# Patient Record
Sex: FEMALE | Race: OTHER | HISPANIC OR LATINO | URBAN - METROPOLITAN AREA
[De-identification: names, ages, dates, MRNs, and addresses within clinical notes are randomized per-mention and may not be internally consistent; named-entity substitution may affect disease eponyms.]

---

## 2017-01-24 ENCOUNTER — OUTPATIENT (OUTPATIENT)
Dept: OUTPATIENT SERVICES | Facility: HOSPITAL | Age: 62
LOS: 1 days | End: 2017-01-24
Payer: COMMERCIAL

## 2017-01-24 PROCEDURE — 73564 X-RAY EXAM KNEE 4 OR MORE: CPT

## 2017-01-24 PROCEDURE — 73564 X-RAY EXAM KNEE 4 OR MORE: CPT | Mod: 26,RT

## 2018-02-26 PROBLEM — Z00.00 ENCOUNTER FOR PREVENTIVE HEALTH EXAMINATION: Status: ACTIVE | Noted: 2018-02-26

## 2018-02-27 ENCOUNTER — APPOINTMENT (OUTPATIENT)
Dept: HEART AND VASCULAR | Facility: CLINIC | Age: 63
End: 2018-02-27
Payer: COMMERCIAL

## 2018-02-27 VITALS
HEIGHT: 65 IN | DIASTOLIC BLOOD PRESSURE: 82 MMHG | TEMPERATURE: 99.1 F | OXYGEN SATURATION: 95 % | HEART RATE: 81 BPM | BODY MASS INDEX: 33.49 KG/M2 | SYSTOLIC BLOOD PRESSURE: 126 MMHG | WEIGHT: 201 LBS

## 2018-02-27 DIAGNOSIS — Z80.9 FAMILY HISTORY OF MALIGNANT NEOPLASM, UNSPECIFIED: ICD-10-CM

## 2018-02-27 DIAGNOSIS — Z82.49 FAMILY HISTORY OF ISCHEMIC HEART DISEASE AND OTHER DISEASES OF THE CIRCULATORY SYSTEM: ICD-10-CM

## 2018-02-27 DIAGNOSIS — Z83.3 FAMILY HISTORY OF DIABETES MELLITUS: ICD-10-CM

## 2018-02-27 DIAGNOSIS — Z82.3 FAMILY HISTORY OF STROKE: ICD-10-CM

## 2018-02-27 DIAGNOSIS — Z87.891 PERSONAL HISTORY OF NICOTINE DEPENDENCE: ICD-10-CM

## 2018-02-27 PROCEDURE — 93000 ELECTROCARDIOGRAM COMPLETE: CPT

## 2018-02-27 PROCEDURE — 99204 OFFICE O/P NEW MOD 45 MIN: CPT | Mod: 25

## 2018-03-21 ENCOUNTER — APPOINTMENT (OUTPATIENT)
Dept: HEART AND VASCULAR | Facility: CLINIC | Age: 63
End: 2018-03-21

## 2018-04-11 ENCOUNTER — APPOINTMENT (OUTPATIENT)
Dept: HEART AND VASCULAR | Facility: CLINIC | Age: 63
End: 2018-04-11
Payer: COMMERCIAL

## 2018-04-11 VITALS — HEIGHT: 65 IN | WEIGHT: 200.99 LBS | BODY MASS INDEX: 33.49 KG/M2

## 2018-04-11 PROCEDURE — 78452 HT MUSCLE IMAGE SPECT MULT: CPT

## 2018-04-11 PROCEDURE — 93015 CV STRESS TEST SUPVJ I&R: CPT

## 2018-04-11 PROCEDURE — A9500: CPT

## 2018-04-11 PROCEDURE — 93306 TTE W/DOPPLER COMPLETE: CPT

## 2019-08-14 ENCOUNTER — OUTPATIENT (OUTPATIENT)
Dept: OUTPATIENT SERVICES | Facility: HOSPITAL | Age: 64
LOS: 1 days | End: 2019-08-14
Payer: COMMERCIAL

## 2019-08-14 DIAGNOSIS — Z22.321 CARRIER OR SUSPECTED CARRIER OF METHICILLIN SUSCEPTIBLE STAPHYLOCOCCUS AUREUS: ICD-10-CM

## 2019-08-14 LAB
MRSA PCR RESULT.: NEGATIVE — SIGNIFICANT CHANGE UP
S AUREUS DNA NOSE QL NAA+PROBE: NEGATIVE — SIGNIFICANT CHANGE UP

## 2019-08-14 PROCEDURE — 87641 MR-STAPH DNA AMP PROBE: CPT

## 2019-08-20 ENCOUNTER — OUTPATIENT (OUTPATIENT)
Dept: OUTPATIENT SERVICES | Facility: HOSPITAL | Age: 64
LOS: 1 days | End: 2019-08-20
Payer: MEDICARE

## 2019-08-20 PROCEDURE — 73502 X-RAY EXAM HIP UNI 2-3 VIEWS: CPT

## 2019-08-20 PROCEDURE — 73502 X-RAY EXAM HIP UNI 2-3 VIEWS: CPT | Mod: 26,LT

## 2019-09-11 ENCOUNTER — APPOINTMENT (OUTPATIENT)
Dept: CT IMAGING | Facility: HOSPITAL | Age: 64
End: 2019-09-11
Payer: COMMERCIAL

## 2019-09-11 ENCOUNTER — OUTPATIENT (OUTPATIENT)
Dept: OUTPATIENT SERVICES | Facility: HOSPITAL | Age: 64
LOS: 1 days | End: 2019-09-11
Payer: MEDICARE

## 2019-09-11 PROCEDURE — 74178 CT ABD&PLV WO CNTR FLWD CNTR: CPT | Mod: 26

## 2019-09-11 PROCEDURE — 74178 CT ABD&PLV WO CNTR FLWD CNTR: CPT

## 2019-09-24 NOTE — ASU PATIENT PROFILE, ADULT - VISION (WITH CORRECTIVE LENSES IF THE PATIENT USUALLY WEARS THEM):
Partially impaired: cannot see medication labels or newsprint, but can see obstacles in path, and the surrounding layout; can count fingers at arm's length distance glass/Partially impaired: cannot see medication labels or newsprint, but can see obstacles in path, and the surrounding layout; can count fingers at arm's length

## 2019-09-24 NOTE — ASU PATIENT PROFILE, ADULT - PSH
History of bunionectomy of left great toe    History of cholecystectomy    History of lumbar surgery  L3-4,  History of tonsillectomy

## 2019-09-25 ENCOUNTER — INPATIENT (INPATIENT)
Facility: HOSPITAL | Age: 64
LOS: 2 days | Discharge: HOME HEALTH | DRG: 470 | End: 2019-09-28
Attending: ORTHOPAEDIC SURGERY | Admitting: ORTHOPAEDIC SURGERY
Payer: MEDICARE

## 2019-09-25 VITALS
HEIGHT: 66 IN | WEIGHT: 204.15 LBS | DIASTOLIC BLOOD PRESSURE: 78 MMHG | HEART RATE: 76 BPM | TEMPERATURE: 98 F | OXYGEN SATURATION: 97 % | SYSTOLIC BLOOD PRESSURE: 121 MMHG | RESPIRATION RATE: 16 BRPM

## 2019-09-25 DIAGNOSIS — Z98.890 OTHER SPECIFIED POSTPROCEDURAL STATES: Chronic | ICD-10-CM

## 2019-09-25 DIAGNOSIS — M16.12 UNILATERAL PRIMARY OSTEOARTHRITIS, LEFT HIP: ICD-10-CM

## 2019-09-25 DIAGNOSIS — Z90.89 ACQUIRED ABSENCE OF OTHER ORGANS: Chronic | ICD-10-CM

## 2019-09-25 DIAGNOSIS — Z90.49 ACQUIRED ABSENCE OF OTHER SPECIFIED PARTS OF DIGESTIVE TRACT: Chronic | ICD-10-CM

## 2019-09-25 DIAGNOSIS — J45.909 UNSPECIFIED ASTHMA, UNCOMPLICATED: ICD-10-CM

## 2019-09-25 LAB
ANION GAP SERPL CALC-SCNC: 9 MMOL/L — SIGNIFICANT CHANGE UP (ref 5–17)
APPEARANCE UR: ABNORMAL
BACTERIA # UR AUTO: PRESENT /HPF
BILIRUB UR-MCNC: NEGATIVE — SIGNIFICANT CHANGE UP
BLD GP AB SCN SERPL QL: NEGATIVE — SIGNIFICANT CHANGE UP
BUN SERPL-MCNC: 16 MG/DL — SIGNIFICANT CHANGE UP (ref 7–23)
CALCIUM SERPL-MCNC: 9 MG/DL — SIGNIFICANT CHANGE UP (ref 8.4–10.5)
CHLORIDE SERPL-SCNC: 106 MMOL/L — SIGNIFICANT CHANGE UP (ref 96–108)
CO2 SERPL-SCNC: 25 MMOL/L — SIGNIFICANT CHANGE UP (ref 22–31)
COLOR SPEC: YELLOW — SIGNIFICANT CHANGE UP
CREAT SERPL-MCNC: 0.92 MG/DL — SIGNIFICANT CHANGE UP (ref 0.5–1.3)
DIFF PNL FLD: ABNORMAL
EPI CELLS # UR: ABNORMAL /HPF (ref 0–5)
GLUCOSE SERPL-MCNC: 100 MG/DL — HIGH (ref 70–99)
GLUCOSE UR QL: NEGATIVE — SIGNIFICANT CHANGE UP
KETONES UR-MCNC: NEGATIVE — SIGNIFICANT CHANGE UP
LEUKOCYTE ESTERASE UR-ACNC: ABNORMAL
NITRITE UR-MCNC: NEGATIVE — SIGNIFICANT CHANGE UP
PH UR: 6 — SIGNIFICANT CHANGE UP (ref 5–8)
POTASSIUM SERPL-MCNC: 4.6 MMOL/L — SIGNIFICANT CHANGE UP (ref 3.5–5.3)
POTASSIUM SERPL-SCNC: 4.6 MMOL/L — SIGNIFICANT CHANGE UP (ref 3.5–5.3)
PROT UR-MCNC: NEGATIVE MG/DL — SIGNIFICANT CHANGE UP
RBC CASTS # UR COMP ASSIST: ABNORMAL /HPF
RH IG SCN BLD-IMP: NEGATIVE — SIGNIFICANT CHANGE UP
SODIUM SERPL-SCNC: 140 MMOL/L — SIGNIFICANT CHANGE UP (ref 135–145)
SP GR SPEC: 1.02 — SIGNIFICANT CHANGE UP (ref 1–1.03)
UROBILINOGEN FLD QL: 0.2 E.U./DL — SIGNIFICANT CHANGE UP
WBC UR QL: ABNORMAL /HPF

## 2019-09-25 PROCEDURE — 72170 X-RAY EXAM OF PELVIS: CPT | Mod: 26

## 2019-09-25 RX ORDER — DOCUSATE SODIUM 100 MG
100 CAPSULE ORAL THREE TIMES A DAY
Refills: 0 | Status: DISCONTINUED | OUTPATIENT
Start: 2019-09-25 | End: 2019-09-28

## 2019-09-25 RX ORDER — MORPHINE SULFATE 50 MG/1
2 CAPSULE, EXTENDED RELEASE ORAL EVERY 4 HOURS
Refills: 0 | Status: DISCONTINUED | OUTPATIENT
Start: 2019-09-25 | End: 2019-09-28

## 2019-09-25 RX ORDER — MORPHINE SULFATE 50 MG/1
2 CAPSULE, EXTENDED RELEASE ORAL
Refills: 0 | Status: DISCONTINUED | OUTPATIENT
Start: 2019-09-25 | End: 2019-09-28

## 2019-09-25 RX ORDER — OXYCODONE HYDROCHLORIDE 5 MG/1
5 TABLET ORAL EVERY 4 HOURS
Refills: 0 | Status: DISCONTINUED | OUTPATIENT
Start: 2019-09-25 | End: 2019-09-28

## 2019-09-25 RX ORDER — SODIUM CHLORIDE 9 MG/ML
1000 INJECTION, SOLUTION INTRAVENOUS
Refills: 0 | Status: DISCONTINUED | OUTPATIENT
Start: 2019-09-25 | End: 2019-09-28

## 2019-09-25 RX ORDER — MAGNESIUM HYDROXIDE 400 MG/1
30 TABLET, CHEWABLE ORAL DAILY
Refills: 0 | Status: DISCONTINUED | OUTPATIENT
Start: 2019-09-25 | End: 2019-09-28

## 2019-09-25 RX ORDER — SENNA PLUS 8.6 MG/1
2 TABLET ORAL AT BEDTIME
Refills: 0 | Status: DISCONTINUED | OUTPATIENT
Start: 2019-09-25 | End: 2019-09-28

## 2019-09-25 RX ORDER — PANTOPRAZOLE SODIUM 20 MG/1
40 TABLET, DELAYED RELEASE ORAL
Refills: 0 | Status: DISCONTINUED | OUTPATIENT
Start: 2019-09-25 | End: 2019-09-28

## 2019-09-25 RX ORDER — KETOROLAC TROMETHAMINE 30 MG/ML
15 SYRINGE (ML) INJECTION EVERY 8 HOURS
Refills: 0 | Status: DISCONTINUED | OUTPATIENT
Start: 2019-09-25 | End: 2019-09-27

## 2019-09-25 RX ORDER — CELECOXIB 200 MG/1
200 CAPSULE ORAL
Refills: 0 | Status: DISCONTINUED | OUTPATIENT
Start: 2019-09-27 | End: 2019-09-28

## 2019-09-25 RX ORDER — CEFAZOLIN SODIUM 1 G
2000 VIAL (EA) INJECTION EVERY 8 HOURS
Refills: 0 | Status: DISCONTINUED | OUTPATIENT
Start: 2019-09-25 | End: 2019-09-25

## 2019-09-25 RX ORDER — POLYETHYLENE GLYCOL 3350 17 G/17G
17 POWDER, FOR SOLUTION ORAL DAILY
Refills: 0 | Status: DISCONTINUED | OUTPATIENT
Start: 2019-09-25 | End: 2019-09-28

## 2019-09-25 RX ORDER — PROCHLORPERAZINE MALEATE 5 MG
5 TABLET ORAL ONCE
Refills: 0 | Status: DISCONTINUED | OUTPATIENT
Start: 2019-09-25 | End: 2019-09-28

## 2019-09-25 RX ORDER — ACETAMINOPHEN 500 MG
975 TABLET ORAL EVERY 8 HOURS
Refills: 0 | Status: DISCONTINUED | OUTPATIENT
Start: 2019-09-25 | End: 2019-09-28

## 2019-09-25 RX ORDER — ONDANSETRON 8 MG/1
4 TABLET, FILM COATED ORAL EVERY 6 HOURS
Refills: 0 | Status: DISCONTINUED | OUTPATIENT
Start: 2019-09-25 | End: 2019-09-28

## 2019-09-25 RX ORDER — CEFAZOLIN SODIUM 1 G
2000 VIAL (EA) INJECTION EVERY 8 HOURS
Refills: 0 | Status: COMPLETED | OUTPATIENT
Start: 2019-09-25 | End: 2019-09-26

## 2019-09-25 RX ORDER — ASPIRIN/CALCIUM CARB/MAGNESIUM 324 MG
325 TABLET ORAL
Refills: 0 | Status: DISCONTINUED | OUTPATIENT
Start: 2019-09-25 | End: 2019-09-28

## 2019-09-25 RX ORDER — OXYCODONE HYDROCHLORIDE 5 MG/1
10 TABLET ORAL EVERY 4 HOURS
Refills: 0 | Status: DISCONTINUED | OUTPATIENT
Start: 2019-09-25 | End: 2019-09-28

## 2019-09-25 RX ADMIN — Medication 15 MILLIGRAM(S): at 21:18

## 2019-09-25 RX ADMIN — Medication 325 MILLIGRAM(S): at 18:23

## 2019-09-25 RX ADMIN — Medication 15 MILLIGRAM(S): at 21:03

## 2019-09-25 RX ADMIN — Medication 2000 MILLIGRAM(S): at 18:23

## 2019-09-25 RX ADMIN — Medication 100 MILLIGRAM(S): at 21:03

## 2019-09-25 RX ADMIN — OXYCODONE HYDROCHLORIDE 10 MILLIGRAM(S): 5 TABLET ORAL at 22:03

## 2019-09-25 RX ADMIN — OXYCODONE HYDROCHLORIDE 10 MILLIGRAM(S): 5 TABLET ORAL at 21:03

## 2019-09-25 RX ADMIN — Medication 975 MILLIGRAM(S): at 22:03

## 2019-09-25 RX ADMIN — Medication 975 MILLIGRAM(S): at 21:03

## 2019-09-25 NOTE — H&P ADULT - PROBLEM SELECTOR PLAN 1
Admit to Orthopaedic Service.  Presents today for elective left hip DAY  Pt medically stable and cleared for procedure today by Dr. Enriquez

## 2019-09-25 NOTE — PROGRESS NOTE ADULT - SUBJECTIVE AND OBJECTIVE BOX
Orthopaedic Surgery Post Operative Check    Procedure: left total hip replacement   Surgeon: Dr. Solares     Pt comfortable without complaints, pain controlled  Denies CP, SOB, N/V, numbness/tingling    Vital Signs Last 24 Hrs  T(C): --  T(F): --  HR: 56 (09-25-19 @ 14:26) (54 - 68)  BP: 114/71 (09-25-19 @ 14:26) (114/71 - 133/75)  BP(mean): 97 (09-25-19 @ 13:50) (89 - 97)  RR: 10 (09-25-19 @ 14:26) (10 - 31)  SpO2: 96% (09-25-19 @ 14:26) (96% - 99%)  AVSS    General: Pt Alert and oriented, NAD  DSG C/D/I left hip prevena   Pulses: DP pulses palpable bilateral lower extremities   Sensation: intact to bilateral lower extremities   Motor: EHL/FHL/TA/GS 5/5 bilateral lower extremities     25 Sep 2019 09:07    140    |  106    |  16     ----------------------------<  100    4.6     |  25     |  0.92           Post-op X-Ray: prosthesis in place    A/P: 63yFemale POD#0 s/p  left total hip replacement   - Stable  - Pain Control  - DVT ppx: ASA  - Post op abx: ancef  - PT, WBS:  WBAT  - f/u AM labs     Ortho Pager [phone]

## 2019-09-25 NOTE — H&P ADULT - NSICDXPASTSURGICALHX_GEN_ALL_CORE_FT
PAST SURGICAL HISTORY:  History of bunionectomy of left great toe     History of cholecystectomy     History of lumbar surgery L3-4,    History of tonsillectomy

## 2019-09-25 NOTE — ASU PREOP CHECKLIST - NS PREOP CHK CHLOROHEX WASH
Pt instr lab here in [place] open til 7pm, will have to refrigerate stool if not able to bring today   in ASU:

## 2019-09-25 NOTE — PHYSICAL THERAPY INITIAL EVALUATION ADULT - CRITERIA FOR SKILLED THERAPEUTIC INTERVENTIONS
impairments found/anticipated discharge recommendation/therapy frequency/anticipated equipment needs at discharge/rehab potential

## 2019-09-25 NOTE — H&P ADULT - HISTORY OF PRESENT ILLNESS
63y F with left hip pain x numerous years worsening recently last 4 months. Patient states she has pain ambulating. uses a cane to ambulate. Patient has tried oral med with some relief. Patient denies any CP, SOB, fever, chills, numbness/tinging.   Patient here for left total hip replacement

## 2019-09-25 NOTE — H&P ADULT - NSHPLABSRESULTS_GEN_ALL_CORE
Preop cbc,, pt/inr, ptt, ; nasal swab negative  BMP -> K+ 5.5 repeat DOS  UA + leuks -> Repeat DOS  mrsa neg  preop cxr wnl per clearance  preop ekg wnl per clearance

## 2019-09-25 NOTE — H&P ADULT - NSHPPHYSICALEXAM_GEN_ALL_CORE
PE: Normal head, atraumatic. Normal skin, no rashes/lesions/erythema.  Left hip:  TTP of the left groin  Decrease ROM 2/2 to pain  Sensation intact to light touch  Muscle strength 5/5 to EHL/TA/GS  Pedal pulse 2+  Compartments are soft and compressible b/l, nonTTP        Rest of PE per medical clearance.

## 2019-09-26 ENCOUNTER — TRANSCRIPTION ENCOUNTER (OUTPATIENT)
Age: 64
End: 2019-09-26

## 2019-09-26 DIAGNOSIS — Z98.890 OTHER SPECIFIED POSTPROCEDURAL STATES: ICD-10-CM

## 2019-09-26 LAB
ANION GAP SERPL CALC-SCNC: 10 MMOL/L — SIGNIFICANT CHANGE UP (ref 5–17)
BUN SERPL-MCNC: 19 MG/DL — SIGNIFICANT CHANGE UP (ref 7–23)
CALCIUM SERPL-MCNC: 9 MG/DL — SIGNIFICANT CHANGE UP (ref 8.4–10.5)
CHLORIDE SERPL-SCNC: 103 MMOL/L — SIGNIFICANT CHANGE UP (ref 96–108)
CO2 SERPL-SCNC: 24 MMOL/L — SIGNIFICANT CHANGE UP (ref 22–31)
CREAT SERPL-MCNC: 0.84 MG/DL — SIGNIFICANT CHANGE UP (ref 0.5–1.3)
EXTRA GREEN TOP TUBE: SIGNIFICANT CHANGE UP
EXTRA LAVENDER TOP TUBE: SIGNIFICANT CHANGE UP
GLUCOSE SERPL-MCNC: 129 MG/DL — HIGH (ref 70–99)
HCT VFR BLD CALC: 37.6 % — SIGNIFICANT CHANGE UP (ref 34.5–45)
HCV AB S/CO SERPL IA: 0.06 S/CO — SIGNIFICANT CHANGE UP
HCV AB SERPL-IMP: SIGNIFICANT CHANGE UP
HGB BLD-MCNC: 12.2 G/DL — SIGNIFICANT CHANGE UP (ref 11.5–15.5)
MCHC RBC-ENTMCNC: 28.2 PG — SIGNIFICANT CHANGE UP (ref 27–34)
MCHC RBC-ENTMCNC: 32.4 GM/DL — SIGNIFICANT CHANGE UP (ref 32–36)
MCV RBC AUTO: 86.8 FL — SIGNIFICANT CHANGE UP (ref 80–100)
NRBC # BLD: 0 /100 WBCS — SIGNIFICANT CHANGE UP (ref 0–0)
PLATELET # BLD AUTO: 244 K/UL — SIGNIFICANT CHANGE UP (ref 150–400)
POTASSIUM SERPL-MCNC: 4.3 MMOL/L — SIGNIFICANT CHANGE UP (ref 3.5–5.3)
POTASSIUM SERPL-SCNC: 4.3 MMOL/L — SIGNIFICANT CHANGE UP (ref 3.5–5.3)
RBC # BLD: 4.33 M/UL — SIGNIFICANT CHANGE UP (ref 3.8–5.2)
RBC # FLD: 11.9 % — SIGNIFICANT CHANGE UP (ref 10.3–14.5)
SODIUM SERPL-SCNC: 137 MMOL/L — SIGNIFICANT CHANGE UP (ref 135–145)
WBC # BLD: 10.76 K/UL — HIGH (ref 3.8–10.5)
WBC # FLD AUTO: 10.76 K/UL — HIGH (ref 3.8–10.5)

## 2019-09-26 PROCEDURE — 99233 SBSQ HOSP IP/OBS HIGH 50: CPT | Mod: GC

## 2019-09-26 RX ORDER — SENNA PLUS 8.6 MG/1
2 TABLET ORAL
Qty: 0 | Refills: 0 | DISCHARGE
Start: 2019-09-26

## 2019-09-26 RX ORDER — OXYCODONE HYDROCHLORIDE 5 MG/1
1 TABLET ORAL
Qty: 28 | Refills: 0
Start: 2019-09-26 | End: 2019-10-02

## 2019-09-26 RX ORDER — POLYETHYLENE GLYCOL 3350 17 G/17G
17 POWDER, FOR SOLUTION ORAL
Qty: 0 | Refills: 0 | DISCHARGE
Start: 2019-09-26

## 2019-09-26 RX ORDER — CELECOXIB 200 MG/1
1 CAPSULE ORAL
Qty: 60 | Refills: 0
Start: 2019-09-26 | End: 2019-10-25

## 2019-09-26 RX ORDER — ACETAMINOPHEN 500 MG
3 TABLET ORAL
Qty: 0 | Refills: 0 | DISCHARGE
Start: 2019-09-26

## 2019-09-26 RX ORDER — DOCUSATE SODIUM 100 MG
1 CAPSULE ORAL
Qty: 0 | Refills: 0 | DISCHARGE
Start: 2019-09-26

## 2019-09-26 RX ORDER — ASPIRIN/CALCIUM CARB/MAGNESIUM 324 MG
1 TABLET ORAL
Qty: 60 | Refills: 0
Start: 2019-09-26 | End: 2019-10-25

## 2019-09-26 RX ADMIN — Medication 2000 MILLIGRAM(S): at 02:32

## 2019-09-26 RX ADMIN — OXYCODONE HYDROCHLORIDE 5 MILLIGRAM(S): 5 TABLET ORAL at 11:20

## 2019-09-26 RX ADMIN — Medication 975 MILLIGRAM(S): at 06:05

## 2019-09-26 RX ADMIN — Medication 100 MILLIGRAM(S): at 05:05

## 2019-09-26 RX ADMIN — OXYCODONE HYDROCHLORIDE 10 MILLIGRAM(S): 5 TABLET ORAL at 05:05

## 2019-09-26 RX ADMIN — OXYCODONE HYDROCHLORIDE 10 MILLIGRAM(S): 5 TABLET ORAL at 06:05

## 2019-09-26 RX ADMIN — PANTOPRAZOLE SODIUM 40 MILLIGRAM(S): 20 TABLET, DELAYED RELEASE ORAL at 05:06

## 2019-09-26 RX ADMIN — Medication 100 MILLIGRAM(S): at 21:34

## 2019-09-26 RX ADMIN — Medication 15 MILLIGRAM(S): at 05:05

## 2019-09-26 RX ADMIN — OXYCODONE HYDROCHLORIDE 10 MILLIGRAM(S): 5 TABLET ORAL at 18:55

## 2019-09-26 RX ADMIN — Medication 325 MILLIGRAM(S): at 17:56

## 2019-09-26 RX ADMIN — Medication 325 MILLIGRAM(S): at 05:06

## 2019-09-26 RX ADMIN — Medication 100 MILLIGRAM(S): at 13:42

## 2019-09-26 RX ADMIN — Medication 15 MILLIGRAM(S): at 05:20

## 2019-09-26 RX ADMIN — OXYCODONE HYDROCHLORIDE 5 MILLIGRAM(S): 5 TABLET ORAL at 10:20

## 2019-09-26 RX ADMIN — Medication 975 MILLIGRAM(S): at 05:05

## 2019-09-26 RX ADMIN — OXYCODONE HYDROCHLORIDE 10 MILLIGRAM(S): 5 TABLET ORAL at 17:55

## 2019-09-26 NOTE — PROGRESS NOTE ADULT - ASSESSMENT
63y f s/p L DAY/POSTERIOR 9/25    -PT WBAT/Posterior Hip Precautions  -Pain control  -DVT PPX: ASA  -Dispo Planning: Home

## 2019-09-26 NOTE — DISCHARGE NOTE PROVIDER - CARE PROVIDER_API CALL
Vance Solares)  Orthopaedic Surgery  [address]  [place], [place] [address]  Phone: [phone]  Fax: [phone]  Follow Up Time: Vance Solares)  Orthopaedic Surgery  [address] [place] [address]  Phone: [phone]  Fax: [phone]  Follow Up Time: 2 weeks

## 2019-09-26 NOTE — PROGRESS NOTE ADULT - SUBJECTIVE AND OBJECTIVE BOX
S: No events overnight    O:   Vital Signs Last 24 Hrs  T(C): 36.4 (26 Sep 2019 05:04), Max: 36.9 (25 Sep 2019 19:27)  T(F): 97.5 (26 Sep 2019 05:04), Max: 98.4 (25 Sep 2019 19:27)  HR: 74 (26 Sep 2019 05:04) (54 - 80)  BP: 122/73 (26 Sep 2019 05:04) (112/70 - 169/92)  BP(mean): 105 (25 Sep 2019 16:15) (89 - 105)  RR: 17 (26 Sep 2019 05:04) (10 - 31)  SpO2: 97% (26 Sep 2019 05:04) (95% - 99%)    09-25 @ 07:01  -  09-26 @ 07:00  --------------------------------------------------------  IN:  Total IN: 0 mL    OUT:    Voided: 450 mL  Total OUT: 450 mL    Total NET: -450 mL    PE:  LLE  DSG CDI  Sensation to light touch intact S/S/DP/SP/Tib, Strength EHL/FHL/TA/GS 5/5, DP 2+, Ext WWP    09-25    140  |  106  |  16  ----------------------------<  100<H>  4.6   |  25  |  0.92    Ca    9.0      25 Sep 2019 09:07

## 2019-09-26 NOTE — PROGRESS NOTE ADULT - SUBJECTIVE AND OBJECTIVE BOX
Interval Events: Reviewed  Patient seen and examined at bedside.    Patient is a 63y old  Female who presents with a chief complaint of left hip pain/surgery (26 Sep 2019 09:43)      PAST MEDICAL & SURGICAL HISTORY:  Asthma: inhalator stopped since one yr ago  History of bunionectomy of left great toe  History of tonsillectomy  History of cholecystectomy  History of lumbar surgery: L3-4,      MEDICATIONS:  Pulmonary:    Antimicrobials:    Anticoagulants:  aspirin enteric coated 325 milliGRAM(s) Oral two times a day    Cardiac:      Allergies    tetracyclines (Nausea)    Intolerances        Vital Signs Last 24 Hrs  T(C): 36.6 (26 Sep 2019 08:32), Max: 36.9 (25 Sep 2019 19:27)  T(F): 97.9 (26 Sep 2019 08:32), Max: 98.4 (25 Sep 2019 19:27)  HR: 66 (26 Sep 2019 08:32) (54 - 80)  BP: 125/79 (26 Sep 2019 08:32) (112/70 - 169/92)  BP(mean): 105 (25 Sep 2019 16:15) (103 - 105)  RR: 16 (26 Sep 2019 08:32) (10 - 27)  SpO2: 97% (26 Sep 2019 08:32) (95% - 98%)     @ 07: @ 07:00  --------------------------------------------------------  IN: 0 mL / OUT: 450 mL / NET: -450 mL     @ 07: @ 13:59  --------------------------------------------------------  IN: 910 mL / OUT: 250 mL / NET: 660 mL          Review of Systems:   •	General: negative  •	Skin/Breast: negative  •	Ophthalmologic: negative  •	ENMT: negative  •	Respiratory and Thorax: negative  •	Cardiovascular: negative  •	Gastrointestinal: negative  •	Genitourinary: negative  •	Musculoskeletal: negative  •	Neurological: negative  •	Psychiatric: negative  •	Hematology/Lymphatics: negative  •	Endocrine: negative  •	Allergic/Immunologic: negative    Physical Exam:   • Constitutional:	Well-developed, well nourished  • Eyes:	EOMI; PERRL; no drainage or redness  • ENMT:	No oral lesions; no gross abnormalities  • Neck	No bruits; no thyromegaly or nodules  • Breasts:	not examined  • Back:	No deformity or limitation of movement  • Respiratory:	Breath Sounds equal & clear to percussion & auscultation, no accessory muscle use  • Cardiovascular:	Regular rate & rhythm, normal S1, S2; no murmurs, gallops or rubs; no S3, S4  • Gastrointestinal:	Soft, non-tender, no hepatosplenomegaly, normal bowel sounds  • Genitourinary:	not examined  • Rectal: not examined  • Extremities:	No cyanosis, clubbing or edema  • Vascular:	Equal and normal pulses (carotid, femoral, dorsalis pedis)  • Neurologica:l	not examined  • Skin:	No lesions; no rash  • Lymph Nodes:	No lymphadedenopathy  • Musculoskeletal:	No joint pain, swelling or deformity; no limitation of movement        LABS:      CBC Full  -  ( 26 Sep 2019 08:01 )  WBC Count : 10.76 K/uL  RBC Count : 4.33 M/uL  Hemoglobin : 12.2 g/dL  Hematocrit : 37.6 %  Platelet Count - Automated : 244 K/uL  Mean Cell Volume : 86.8 fl  Mean Cell Hemoglobin : 28.2 pg  Mean Cell Hemoglobin Concentration : 32.4 gm/dL  Auto Neutrophil # : x  Auto Lymphocyte # : x  Auto Monocyte # : x  Auto Eosinophil # : x  Auto Basophil # : x  Auto Neutrophil % : x  Auto Lymphocyte % : x  Auto Monocyte % : x  Auto Eosinophil % : x  Auto Basophil % : x    -    137  |  103  |  19  ----------------------------<  129<H>  4.3   |  24  |  0.84    Ca    9.0      26 Sep 2019 08:02            Urinalysis Basic - ( 25 Sep 2019 08:34 )    Color: Yellow / Appearance: Hazy / S.025 / pH: x  Gluc: x / Ketone: NEGATIVE  / Bili: Negative / Urobili: 0.2 E.U./dL   Blood: x / Protein: NEGATIVE mg/dL / Nitrite: NEGATIVE   Leuk Esterase: Small / RBC: 5-10 /HPF / WBC 5-10 /HPF   Sq Epi: x / Non Sq Epi: 5-10 /HPF / Bacteria: Present /HPF                  RADIOLOGY & ADDITIONAL STUDIES (The following images were personally reviewed):  Palacios:                                     No  Urine output:                       adequate  DVT prophylaxis:                 Yes  Flattus:                                  Yes  Bowel movement:              No Interval Events: Reviewed  Patient seen and examined at bedside.    Patient is a 63y old  Female who presents with a chief complaint of left hip pain/surgery (26 Sep 2019 09:43)  she is doing well  pain is controlled and she did PT     PAST MEDICAL & SURGICAL HISTORY:  Asthma: inhalator stopped since one yr ago  History of bunionectomy of left great toe  History of tonsillectomy  History of cholecystectomy  History of lumbar surgery: L3-4,      MEDICATIONS:  Pulmonary:    Antimicrobials:    Anticoagulants:  aspirin enteric coated 325 milliGRAM(s) Oral two times a day    Cardiac:      Allergies    tetracyclines (Nausea)    Intolerances        Vital Signs Last 24 Hrs  T(C): 36.6 (26 Sep 2019 08:32), Max: 36.9 (25 Sep 2019 19:27)  T(F): 97.9 (26 Sep 2019 08:32), Max: 98.4 (25 Sep 2019 19:27)  HR: 66 (26 Sep 2019 08:32) (54 - 80)  BP: 125/79 (26 Sep 2019 08:32) (112/70 - 169/92)  BP(mean): 105 (25 Sep 2019 16:15) (103 - 105)  RR: 16 (26 Sep 2019 08:32) (10 - 27)  SpO2: 97% (26 Sep 2019 08:32) (95% - 98%)     @ 07: @ 07:00  --------------------------------------------------------  IN: 0 mL / OUT: 450 mL / NET: -450 mL     @ 07: @ 13:59  --------------------------------------------------------  IN: 910 mL / OUT: 250 mL / NET: 660 mL          Review of Systems:   •	General: negative  •	Skin/Breast: negative  •	Ophthalmologic: negative  •	ENMT: negative  •	Respiratory and Thorax: negative  •	Cardiovascular: negative  •	Gastrointestinal: negative  •	Genitourinary: negative  •	Musculoskeletal: negative  •	Neurological: negative  •	Psychiatric: negative  •	Hematology/Lymphatics: negative  •	Endocrine: negative  •	Allergic/Immunologic: negative    Physical Exam:   • Constitutional:	Well-developed, well nourished  • Eyes:	EOMI; PERRL; no drainage or redness  • ENMT:	No oral lesions; no gross abnormalities  • Neck	No bruits; no thyromegaly or nodules  • Breasts:	not examined  • Back:	No deformity or limitation of movement  • Respiratory:	Breath Sounds equal & clear to percussion & auscultation, no accessory muscle use  • Cardiovascular:	Regular rate & rhythm, normal S1, S2; no murmurs, gallops or rubs; no S3, S4  • Gastrointestinal:	Soft, non-tender, no hepatosplenomegaly, normal bowel sounds  • Genitourinary:	not examined  • Rectal: not examined  • Extremities:	No cyanosis, clubbing or edema  • Vascular:	Equal and normal pulses (carotid, femoral, dorsalis pedis)  • Neurologica:l	not examined  • Skin:	No lesions; no rash  • Lymph Nodes:	No lymphadedenopathy  • Musculoskeletal:	No joint pain, swelling or deformity; no limitation of movement        LABS:      CBC Full  -  ( 26 Sep 2019 08:01 )  WBC Count : 10.76 K/uL  RBC Count : 4.33 M/uL  Hemoglobin : 12.2 g/dL  Hematocrit : 37.6 %  Platelet Count - Automated : 244 K/uL  Mean Cell Volume : 86.8 fl  Mean Cell Hemoglobin : 28.2 pg  Mean Cell Hemoglobin Concentration : 32.4 gm/dL  Auto Neutrophil # : x  Auto Lymphocyte # : x  Auto Monocyte # : x  Auto Eosinophil # : x  Auto Basophil # : x  Auto Neutrophil % : x  Auto Lymphocyte % : x  Auto Monocyte % : x  Auto Eosinophil % : x  Auto Basophil % : x        137  |  103  |  19  ----------------------------<  129<H>  4.3   |  24  |  0.84    Ca    9.0      26 Sep 2019 08:02            Urinalysis Basic - ( 25 Sep 2019 08:34 )    Color: Yellow / Appearance: Hazy / S.025 / pH: x  Gluc: x / Ketone: NEGATIVE  / Bili: Negative / Urobili: 0.2 E.U./dL   Blood: x / Protein: NEGATIVE mg/dL / Nitrite: NEGATIVE   Leuk Esterase: Small / RBC: 5-10 /HPF / WBC 5-10 /HPF   Sq Epi: x / Non Sq Epi: 5-10 /HPF / Bacteria: Present /HPF                  RADIOLOGY & ADDITIONAL STUDIES (The following images were personally reviewed):  Palacios:                                     No  Urine output:                       adequate  DVT prophylaxis:                 Yes  Flattus:                                  Yes  Bowel movement:              No

## 2019-09-26 NOTE — DISCHARGE NOTE PROVIDER - NSDCACTIVITY_GEN_ALL_CORE
Do not drive or operate machinery/Do not make important decisions Showering allowed/Walking - Indoors allowed/Do not drive or operate machinery/Do not make important decisions

## 2019-09-26 NOTE — PROGRESS NOTE ADULT - SUBJECTIVE AND OBJECTIVE BOX
Orthopaedic Surgery Progress Note    Post-operative day #1 s/p left total hip replacement     Subjective:     Patient seen and examined. Patient comfortable without complaints, pain controlled.  Denies chest pain, shortness of breath, nausea/vomiting, numbness/tingling.    Objective:    Vital Signs Last 24 Hrs  T(C): 36.4 (09-26-19 @ 05:04), Max: 36.4 (09-26-19 @ 05:04)  T(F): 97.5 (09-26-19 @ 05:04), Max: 97.5 (09-26-19 @ 05:04)  HR: 74 (09-26-19 @ 05:04) (74 - 74)  BP: 122/73 (09-26-19 @ 05:04) (122/73 - 122/73)  BP(mean): --  RR: 17 (09-26-19 @ 05:04) (17 - 17)  SpO2: 97% (09-26-19 @ 05:04) (97% - 97%)  AVSS    PE:  General: Patient alert and oriented, NAD  Dressing: Clean/dry/intact left hip prevena  Pulses: DP pulses palpable bilateral lower extremities   Sensation: intact to bilateral lower extremities   Motor: EHL/FHL/TA/GS 5/5 bilateral lower extremities     25 Sep 2019 09:07    140    |  106    |  16     ----------------------------<  100    4.6     |  25     |  0.92           A/P: 63yFemale POD#1 s/p left THR  1. Pain control as needed  2. DVT prophylaxis: ASA  3. PT, weight-bearing status: WBAT  4. Dispo: home with home PT when clears  5. Appreciate medicine recommendations Dr. James   6. AM labs pending     Ortho Pager [phone]

## 2019-09-26 NOTE — DISCHARGE NOTE PROVIDER - NSDCFUADDINST_GEN_ALL_CORE_FT
Weight bear as tolerated with assistive device.  No strenuous activity, heavy lifting, driving or returning to work until cleared by MD.  You have a prevena dressing. The battery will die in approximately 7 days after surgery. Once the battery dies you may cut off the battery pack. You may shower - dressing is water-resistant, no soaking in bathtubs. Please keep battery pack dry.    Try to have regular bowel movements, take stool softener or laxative if necessary.  Swelling may travel all the way down leg to foot, this is normal and will subside in a few weeks.  Call to schedule an appt with  for follow up, if you have staples or sutures they will be removed in office.  Contact your doctor if you experience: fever greater than 101.5, chills, chest pain, difficulty breathing, redness or excessive drainage around the incision, other concerns.  Follow up with your primary care provider. Weight bear as tolerated with assistive device.  No strenuous activity, heavy lifting, driving or returning to work until cleared by MD.  You have a prevena dressing. The battery will die in approximately 7 days after surgery. Once the battery dies you may cut off the battery pack. You may shower - dressing is water-resistant, no soaking in bathtubs. Please keep battery pack dry.    Try to have regular bowel movements, take stool softener or laxative if necessary.  Swelling may travel all the way down leg to foot, this is normal and will subside in a few weeks.  Call to schedule an appt with Dr. Solares for follow up, if you have staples or sutures they will be removed in office.  Contact your doctor if you experience: fever greater than 101.5, chills, chest pain, difficulty breathing, redness or excessive drainage around the incision, other concerns.  Follow up with your primary care provider.

## 2019-09-26 NOTE — DISCHARGE NOTE PROVIDER - HOSPITAL COURSE
Admitted to orthopedic service 9/25/19    Surgery - left total hip replacement on 9/25/19     Julieta-op Antibiotics    Pain control    DVT prophylaxis    OOB/Physical Therapy

## 2019-09-26 NOTE — DISCHARGE NOTE PROVIDER - NSDCCPCAREPLAN_GEN_ALL_CORE_FT
PRINCIPAL DISCHARGE DIAGNOSIS  Diagnosis: Primary osteoarthritis of left hip  Assessment and Plan of Treatment: Primary osteoarthritis of left hip

## 2019-09-27 LAB
ANION GAP SERPL CALC-SCNC: 9 MMOL/L — SIGNIFICANT CHANGE UP (ref 5–17)
BUN SERPL-MCNC: 21 MG/DL — SIGNIFICANT CHANGE UP (ref 7–23)
CALCIUM SERPL-MCNC: 8.5 MG/DL — SIGNIFICANT CHANGE UP (ref 8.4–10.5)
CHLORIDE SERPL-SCNC: 105 MMOL/L — SIGNIFICANT CHANGE UP (ref 96–108)
CO2 SERPL-SCNC: 25 MMOL/L — SIGNIFICANT CHANGE UP (ref 22–31)
CREAT SERPL-MCNC: 0.92 MG/DL — SIGNIFICANT CHANGE UP (ref 0.5–1.3)
GLUCOSE SERPL-MCNC: 100 MG/DL — HIGH (ref 70–99)
HCT VFR BLD CALC: 33.3 % — LOW (ref 34.5–45)
HGB BLD-MCNC: 10.8 G/DL — LOW (ref 11.5–15.5)
MCHC RBC-ENTMCNC: 28 PG — SIGNIFICANT CHANGE UP (ref 27–34)
MCHC RBC-ENTMCNC: 32.4 GM/DL — SIGNIFICANT CHANGE UP (ref 32–36)
MCV RBC AUTO: 86.3 FL — SIGNIFICANT CHANGE UP (ref 80–100)
NRBC # BLD: 0 /100 WBCS — SIGNIFICANT CHANGE UP (ref 0–0)
PLATELET # BLD AUTO: 212 K/UL — SIGNIFICANT CHANGE UP (ref 150–400)
POTASSIUM SERPL-MCNC: 4.1 MMOL/L — SIGNIFICANT CHANGE UP (ref 3.5–5.3)
POTASSIUM SERPL-SCNC: 4.1 MMOL/L — SIGNIFICANT CHANGE UP (ref 3.5–5.3)
RBC # BLD: 3.86 M/UL — SIGNIFICANT CHANGE UP (ref 3.8–5.2)
RBC # FLD: 12.3 % — SIGNIFICANT CHANGE UP (ref 10.3–14.5)
SODIUM SERPL-SCNC: 139 MMOL/L — SIGNIFICANT CHANGE UP (ref 135–145)
WBC # BLD: 8.63 K/UL — SIGNIFICANT CHANGE UP (ref 3.8–10.5)
WBC # FLD AUTO: 8.63 K/UL — SIGNIFICANT CHANGE UP (ref 3.8–10.5)

## 2019-09-27 RX ADMIN — Medication 975 MILLIGRAM(S): at 14:05

## 2019-09-27 RX ADMIN — PANTOPRAZOLE SODIUM 40 MILLIGRAM(S): 20 TABLET, DELAYED RELEASE ORAL at 05:48

## 2019-09-27 RX ADMIN — Medication 975 MILLIGRAM(S): at 15:05

## 2019-09-27 RX ADMIN — OXYCODONE HYDROCHLORIDE 10 MILLIGRAM(S): 5 TABLET ORAL at 11:05

## 2019-09-27 RX ADMIN — OXYCODONE HYDROCHLORIDE 5 MILLIGRAM(S): 5 TABLET ORAL at 19:27

## 2019-09-27 RX ADMIN — OXYCODONE HYDROCHLORIDE 10 MILLIGRAM(S): 5 TABLET ORAL at 05:53

## 2019-09-27 RX ADMIN — OXYCODONE HYDROCHLORIDE 10 MILLIGRAM(S): 5 TABLET ORAL at 06:43

## 2019-09-27 RX ADMIN — OXYCODONE HYDROCHLORIDE 10 MILLIGRAM(S): 5 TABLET ORAL at 14:06

## 2019-09-27 RX ADMIN — Medication 100 MILLIGRAM(S): at 05:46

## 2019-09-27 RX ADMIN — Medication 325 MILLIGRAM(S): at 18:27

## 2019-09-27 RX ADMIN — OXYCODONE HYDROCHLORIDE 10 MILLIGRAM(S): 5 TABLET ORAL at 10:05

## 2019-09-27 RX ADMIN — Medication 100 MILLIGRAM(S): at 21:48

## 2019-09-27 RX ADMIN — Medication 100 MILLIGRAM(S): at 14:06

## 2019-09-27 RX ADMIN — Medication 975 MILLIGRAM(S): at 21:48

## 2019-09-27 RX ADMIN — POLYETHYLENE GLYCOL 3350 17 GRAM(S): 17 POWDER, FOR SOLUTION ORAL at 14:06

## 2019-09-27 RX ADMIN — CELECOXIB 200 MILLIGRAM(S): 200 CAPSULE ORAL at 05:46

## 2019-09-27 RX ADMIN — Medication 975 MILLIGRAM(S): at 22:48

## 2019-09-27 RX ADMIN — Medication 15 MILLIGRAM(S): at 14:06

## 2019-09-27 RX ADMIN — OXYCODONE HYDROCHLORIDE 5 MILLIGRAM(S): 5 TABLET ORAL at 18:27

## 2019-09-27 RX ADMIN — OXYCODONE HYDROCHLORIDE 10 MILLIGRAM(S): 5 TABLET ORAL at 15:06

## 2019-09-27 RX ADMIN — OXYCODONE HYDROCHLORIDE 10 MILLIGRAM(S): 5 TABLET ORAL at 02:42

## 2019-09-27 RX ADMIN — Medication 325 MILLIGRAM(S): at 05:46

## 2019-09-27 RX ADMIN — CELECOXIB 200 MILLIGRAM(S): 200 CAPSULE ORAL at 19:27

## 2019-09-27 RX ADMIN — Medication 15 MILLIGRAM(S): at 14:21

## 2019-09-27 RX ADMIN — CELECOXIB 200 MILLIGRAM(S): 200 CAPSULE ORAL at 18:27

## 2019-09-27 RX ADMIN — OXYCODONE HYDROCHLORIDE 10 MILLIGRAM(S): 5 TABLET ORAL at 01:42

## 2019-09-27 RX ADMIN — CELECOXIB 200 MILLIGRAM(S): 200 CAPSULE ORAL at 06:46

## 2019-09-27 NOTE — OCCUPATIONAL THERAPY INITIAL EVALUATION ADULT - EATING, PREVIOUS LEVEL OF FUNCTION, OT EVAL
Preventive Health Recommendations  Male Ages 26 - 39    Yearly exam:             See your health care provider every year in order to  o   Review health changes.   o   Discuss preventive care.    o   Review your medicines if your doctor has prescribed any.    You should be tested each year for STDs (sexually transmitted diseases), if you re at risk.     After age 35, talk to your provider about cholesterol testing. If you are at risk for heart disease, have your cholesterol tested at least every 5 years.     If you are at risk for diabetes, you should have a diabetes test (fasting glucose).  Shots: Get a flu shot each year. Get a tetanus shot every 10 years.     Nutrition:    Eat at least 5 servings of fruits and vegetables daily.     Eat whole-grain bread, whole-wheat pasta and brown rice instead of white grains and rice.     Talk to your provider about Calcium and Vitamin D.     Lifestyle    Exercise for at least 150 minutes a week (30 minutes a day, 5 days a week). This will help you control your weight and prevent disease.     Limit alcohol to one drink per day.     No smoking.     Wear sunscreen to prevent skin cancer.     See your dentist every six months for an exam and cleaning.          independent

## 2019-09-27 NOTE — OCCUPATIONAL THERAPY INITIAL EVALUATION ADULT - ADDITIONAL COMMENTS
Patient reports full independence PTA, ambulates with SC outdoors 2/2 pain, also has grab bars in shower, and own commode.

## 2019-09-27 NOTE — PROGRESS NOTE ADULT - SUBJECTIVE AND OBJECTIVE BOX
Ortho Note    64 y/o female s/p left anterior DAY POD #2 with Dr Solares    Patient seen and examined at bedside  endorses increased pain today after PT x2 yesterday  Reports pain medication moderately effective   Denies CP, SOB, N/V, numbness/tingling   voiding without difficulty, +flatus    expresses apprehension about d/c home- lives alone     Vital Signs Last 24 Hrs  T(C): 36.6 (09-27-19 @ 08:26), Max: 37 (09-27-19 @ 05:40)  T(F): 97.8 (09-27-19 @ 08:26), Max: 98.6 (09-27-19 @ 05:40)  HR: 72 (09-27-19 @ 08:26) (72 - 78)  BP: 109/68 (09-27-19 @ 08:26) (109/68 - 111/72)  BP(mean): --  RR: 16 (09-27-19 @ 08:26) (16 - 17)  SpO2: 96% (09-27-19 @ 08:26) (96% - 96%)  AVSS    General: Pt Alert and oriented, NAD  Dressing C/D/I: provena at the the left hip   Pulses: 2+ DP pulses, skin warm, dry intact  calves soft, nontender   Sensation: intact to light touch bilateral LE   Motor: EHL/FHL/TA/GS 5/5 bilaterally                           10.8   8.63  )-----------( 212      ( 27 Sep 2019 06:31 )             33.3   27 Sep 2019 06:31    139    |  105    |  21     ----------------------------<  100    4.1     |  25     |  0.92     Ca    8.5        27 Sep 2019 06:31        A/P: 63yFemale POD#2 s/p L DAY     - Stable, VSS, post op labs stable   - Pain Control: continue PO PRN   - DVT ppx: on ASA 325mg BID   - PT, WBS: WBAT, PT  - OT consulted for home independence  - plan for d/c home pending PT/OT clearance     Ortho Pager [phone]

## 2019-09-27 NOTE — OCCUPATIONAL THERAPY INITIAL EVALUATION ADULT - GENERAL OBSERVATIONS, REHAB EVAL
Consult received. Full note to follow.    Please call for questions.    Thanks,  Bigg Bates MD  Infectious Disease Fellow, PGY-4  Pager: [phone]  [facility]     Patient right hand dominant. Chart reviewed, LOIDA Black cleared patient for OT evaluation. Patient received seated in chair, NAD, + prevena, +IV heplock.

## 2019-09-27 NOTE — PROGRESS NOTE ADULT - SUBJECTIVE AND OBJECTIVE BOX
S: No events overnight    O:   Vital Signs Last 24 Hrs  T(C): 37 (27 Sep 2019 05:40), Max: 37 (27 Sep 2019 05:40)  T(F): 98.6 (27 Sep 2019 05:40), Max: 98.6 (27 Sep 2019 05:40)  HR: 78 (27 Sep 2019 05:40) (66 - 78)  BP: 111/72 (27 Sep 2019 05:40) (111/72 - 125/79)  BP(mean): --  ABP: --  ABP(mean): --  RR: 17 (27 Sep 2019 05:40) (16 - 17)  SpO2: 96% (27 Sep 2019 05:40) (96% - 97%)      PE:  LLE  DSG CDI  Sensation to light touch intact S/S/DP/SP/Tib, Strength EHL/FHL/TA/GS 5/5, DP 2+, Ext WWP                        10.8   8.63  )-----------( 212      ( 27 Sep 2019 06:31 )             33.3   09-26    137  |  103  |  19  ----------------------------<  129<H>  4.3   |  24  |  0.84    Ca    9.0      26 Sep 2019 08:02

## 2019-09-28 ENCOUNTER — TRANSCRIPTION ENCOUNTER (OUTPATIENT)
Age: 64
End: 2019-09-28

## 2019-09-28 VITALS
OXYGEN SATURATION: 95 % | HEART RATE: 74 BPM | DIASTOLIC BLOOD PRESSURE: 75 MMHG | RESPIRATION RATE: 16 BRPM | SYSTOLIC BLOOD PRESSURE: 124 MMHG | TEMPERATURE: 98 F

## 2019-09-28 RX ADMIN — OXYCODONE HYDROCHLORIDE 10 MILLIGRAM(S): 5 TABLET ORAL at 05:47

## 2019-09-28 RX ADMIN — OXYCODONE HYDROCHLORIDE 10 MILLIGRAM(S): 5 TABLET ORAL at 06:47

## 2019-09-28 RX ADMIN — Medication 975 MILLIGRAM(S): at 05:47

## 2019-09-28 RX ADMIN — Medication 325 MILLIGRAM(S): at 05:47

## 2019-09-28 RX ADMIN — CELECOXIB 200 MILLIGRAM(S): 200 CAPSULE ORAL at 06:47

## 2019-09-28 RX ADMIN — Medication 975 MILLIGRAM(S): at 06:47

## 2019-09-28 RX ADMIN — ONDANSETRON 4 MILLIGRAM(S): 8 TABLET, FILM COATED ORAL at 08:28

## 2019-09-28 RX ADMIN — OXYCODONE HYDROCHLORIDE 10 MILLIGRAM(S): 5 TABLET ORAL at 11:40

## 2019-09-28 RX ADMIN — OXYCODONE HYDROCHLORIDE 10 MILLIGRAM(S): 5 TABLET ORAL at 12:40

## 2019-09-28 RX ADMIN — Medication 100 MILLIGRAM(S): at 05:47

## 2019-09-28 RX ADMIN — PANTOPRAZOLE SODIUM 40 MILLIGRAM(S): 20 TABLET, DELAYED RELEASE ORAL at 05:47

## 2019-09-28 RX ADMIN — CELECOXIB 200 MILLIGRAM(S): 200 CAPSULE ORAL at 05:47

## 2019-09-28 NOTE — PROGRESS NOTE ADULT - SUBJECTIVE AND OBJECTIVE BOX
Ortho Note    64 y/o female s/p left anterior DAY POD #3 with Dr Solares    Patient seen and examined at bedside  endorses increased pain today after PT x2 yesterday  Denies CP, SOB, N/V, numbness/tingling   voiding without difficulty, +flatus    expresses apprehension about d/c home- lives alone     Vital Signs Last 24 Hrs  T(C): 36.8 (28 Sep 2019 05:00), Max: 36.9 (27 Sep 2019 16:12)  T(F): 98.3 (28 Sep 2019 05:00), Max: 98.5 (27 Sep 2019 16:12)  HR: 74 (28 Sep 2019 05:00) (72 - 75)  BP: 117/69 (28 Sep 2019 05:00) (109/68 - 120/68)  BP(mean): --  RR: 15 (28 Sep 2019 05:00) (15 - 17)  SpO2: 98% (28 Sep 2019 05:00) (95% - 98%)    AVSS    General: Pt Alert and oriented, NAD  Dressing C/D/I: provena at the the left hip   Pulses: 2+ DP pulses, skin warm, dry intact  calves soft, nontender   Sensation: intact to light touch bilateral LE   Motor: EHL/FHL/TA/GS 5/5 bilaterally                      A/P: 63yFemale POD#3 s/p L DAY     - Stable, VSS, post op labs stable   - Pain Control: continue PO PRN   - DVT ppx: on ASA 325mg BID   - PT, WBS: WBAT, PT  - OT consulted for home independence  - home today    Ortho Pager [phone]

## 2019-09-28 NOTE — DISCHARGE NOTE NURSING/CASE MANAGEMENT/SOCIAL WORK - PATIENT PORTAL LINK FT
You can access the FollowMyHealth Patient Portal offered by [facility] by registering at the following website: [email or web address]. By joining [facility]’s FollowMyHealth portal, you will also be able to view your health information using other applications (apps) compatible with our system.

## 2019-10-02 DIAGNOSIS — M16.12 UNILATERAL PRIMARY OSTEOARTHRITIS, LEFT HIP: ICD-10-CM

## 2019-10-02 DIAGNOSIS — Z88.0 ALLERGY STATUS TO PENICILLIN: ICD-10-CM

## 2019-10-02 DIAGNOSIS — J45.909 UNSPECIFIED ASTHMA, UNCOMPLICATED: ICD-10-CM

## 2019-10-02 DIAGNOSIS — E66.9 OBESITY, UNSPECIFIED: ICD-10-CM

## 2019-10-13 PROCEDURE — 36415 COLL VENOUS BLD VENIPUNCTURE: CPT

## 2019-10-13 PROCEDURE — 97161 PT EVAL LOW COMPLEX 20 MIN: CPT

## 2019-10-13 PROCEDURE — 80048 BASIC METABOLIC PNL TOTAL CA: CPT

## 2019-10-13 PROCEDURE — 85027 COMPLETE CBC AUTOMATED: CPT

## 2019-10-13 PROCEDURE — 97116 GAIT TRAINING THERAPY: CPT

## 2019-10-13 PROCEDURE — C1713: CPT

## 2019-10-13 PROCEDURE — 76000 FLUOROSCOPY <1 HR PHYS/QHP: CPT

## 2019-10-13 PROCEDURE — 72170 X-RAY EXAM OF PELVIS: CPT

## 2019-10-13 PROCEDURE — 86900 BLOOD TYPING SEROLOGIC ABO: CPT

## 2019-10-13 PROCEDURE — 86850 RBC ANTIBODY SCREEN: CPT

## 2019-10-13 PROCEDURE — C1776: CPT

## 2019-10-13 PROCEDURE — 86803 HEPATITIS C AB TEST: CPT

## 2019-10-13 PROCEDURE — 81001 URINALYSIS AUTO W/SCOPE: CPT

## 2019-10-13 PROCEDURE — 86901 BLOOD TYPING SEROLOGIC RH(D): CPT

## 2022-03-07 NOTE — OCCUPATIONAL THERAPY INITIAL EVALUATION ADULT - LEVEL OF INDEPENDENCE: CHAIR TO BED, REHAB EVAL
independent Terbinafine Counseling: Patient counseling regarding adverse effects of terbinafine including but not limited to headache, diarrhea, rash, upset stomach, liver function test abnormalities, itching, taste/smell disturbance, nausea, abdominal pain, and flatulence.  There is a rare possibility of liver failure that can occur when taking terbinafine.  The patient understands that a baseline LFT and kidney function test may be required. The patient verbalized understanding of the proper use and possible adverse effects of terbinafine.  All of the patient's questions and concerns were addressed.

## 2022-04-05 PROBLEM — J45.909 UNSPECIFIED ASTHMA, UNCOMPLICATED: Chronic | Status: ACTIVE | Noted: 2019-09-24

## 2022-04-12 ENCOUNTER — APPOINTMENT (OUTPATIENT)
Dept: CT IMAGING | Facility: HOSPITAL | Age: 67
End: 2022-04-12
Payer: MEDICARE

## 2022-04-12 ENCOUNTER — OUTPATIENT (OUTPATIENT)
Dept: OUTPATIENT SERVICES | Facility: HOSPITAL | Age: 67
LOS: 1 days | End: 2022-04-12
Payer: MEDICARE

## 2022-04-12 DIAGNOSIS — Z90.89 ACQUIRED ABSENCE OF OTHER ORGANS: Chronic | ICD-10-CM

## 2022-04-12 DIAGNOSIS — Z90.49 ACQUIRED ABSENCE OF OTHER SPECIFIED PARTS OF DIGESTIVE TRACT: Chronic | ICD-10-CM

## 2022-04-12 DIAGNOSIS — Z98.890 OTHER SPECIFIED POSTPROCEDURAL STATES: Chronic | ICD-10-CM

## 2022-04-12 LAB — POCT ISTAT CREATININE: 0.9 MG/DL — SIGNIFICANT CHANGE UP (ref 0.5–1.3)

## 2022-04-12 PROCEDURE — 70496 CT ANGIOGRAPHY HEAD: CPT | Mod: 26

## 2022-04-12 PROCEDURE — 82565 ASSAY OF CREATININE: CPT

## 2022-04-12 PROCEDURE — 70496 CT ANGIOGRAPHY HEAD: CPT

## 2022-11-17 NOTE — H&P ADULT - DOES THIS PATIENT HAVE A HISTORY OF OR HAS BEEN DX WITH HEART FAILURE?
Include Z78.9 (Other Specified Conditions Influencing Health Status) As An Associated Diagnosis?: No no

## 2023-05-22 ENCOUNTER — NON-APPOINTMENT (OUTPATIENT)
Age: 68
End: 2023-05-22

## 2023-05-23 ENCOUNTER — APPOINTMENT (OUTPATIENT)
Dept: BREAST CENTER | Facility: CLINIC | Age: 68
End: 2023-05-23
Payer: MEDICARE

## 2023-05-23 VITALS
BODY MASS INDEX: 33.32 KG/M2 | HEART RATE: 88 BPM | WEIGHT: 200 LBS | DIASTOLIC BLOOD PRESSURE: 82 MMHG | SYSTOLIC BLOOD PRESSURE: 127 MMHG | HEIGHT: 65 IN | OXYGEN SATURATION: 95 %

## 2023-05-23 PROCEDURE — 99205 OFFICE O/P NEW HI 60 MIN: CPT

## 2023-05-23 NOTE — ASSESSMENT
[FreeTextEntry1] : 67-year-old female with newly diagnosed right breast invasive ductal carcinoma, ER negative, HI negative, HER2 positive spanning 9 cm across the inferior aspect of the breast

## 2023-05-23 NOTE — PAST MEDICAL HISTORY
[Menarche Age ____] : age at menarche was [unfilled] [Menopause Age____] : age at menopause was [unfilled] [Regular Cycle Intervals] : have been regular [Total Preg ___] : G[unfilled] [Live Births ___] : P[unfilled]  [Abortions ___] : Abortions:[unfilled] [AB Spont ___] : miscarriages: [unfilled]  [Age At Live Birth ___] : Age at live birth: [unfilled] [History of Hormone Replacement Treatment] : has no history of hormone replacement treatment [FreeTextEntry6] : No [FreeTextEntry7] : Yes [FreeTextEntry8] : Yes

## 2023-05-23 NOTE — PHYSICAL EXAM
[Normocephalic] : normocephalic [EOMI] : extra ocular movement intact [Supple] : supple [Examined in the supine and seated position] : examined in the supine and seated position [No dominant masses] : no dominant masses left breast [No Nipple Retraction] : no left nipple retraction [No Nipple Discharge] : no left nipple discharge [No Axillary Lymphadenopathy] : no left axillary lymphadenopathy [No Rashes] : no rashes [de-identified] : Significant bruising throughout inferior aspect of right breast, likely from biopsies.  Nondescript firmness at the 6 o'clock position at the site of the 2.2 cm mass.  Likely obscured due to hematoma [de-identified] : Significant ecchymoses at the inferior aspect of right breast likely from biopsies

## 2023-05-23 NOTE — DATA REVIEWED
[FreeTextEntry1] : 4/13/23 ([facility]) B/l screening mammo: scattered fibroglandular density. There is a highly suspicious spiculated mass with calcifications in between 6 and 7:00 in the right breast 5 cm from the nipple (annotated) for which spot magnification CC/ML and full-field lateral views with tomosynthesis and ultrasound are recommended. \par There is a smaller mass in the more posterior 7:00 right breast 11 cm from the nipple (annotated) for which spot compression CC/MLO and ultrasound are recommended. \par There is a developing mass/asymmetry in the superior aspect of the right breast on the MLO view 6 cm from the nipple (annotated) for which a spot compression MLO view and ultrasound are recommended. \par Pleomorphic calcifications have developed in the middle to posterior right breast from 5:00 to 7:00 highly suspicious for DCIS (annotated) for which additional magnification CC/ML views are recommended.\par The left breast demonstrates no suspicious masses, calcifications, skin thickening or areas of distortion. BI-RADS 0. \par \par 4/26/23 ([facility]) right diag mammo and US: scattered fibroglandular density. 1. Right lower outer quadrant suspicious masses, ultrasound-guided biopsy, 2 sites to demonstrate at least multifocal disease. \par 2. Right lower inner quadrant fine pleomorphic calcifications, stereotactic guided, 1 site recommended, to evaluate for multicentric disease. BI-RADS 5. \par \par 5/16/23 ([facility]) 1. Breast, right, 6:30 4 cm FN, 2.2 cm spiculated mass; ultrasound-guided\par biopsy: - Invasive ductal carcinoma, poorly-differentiated associated with necrosis, measuring at least 12 mm in this material (see note)\par 2. Breast, right, 8:00 10 cm FN, 0.9 cm solid mass; ultrasound-guided biopsy: - Invasive ductal carcinoma, poorly-differentiated associated with mild lymphoid infiltrate, measuring at least 7 mm in this material, morphologically similar to part 1\par 3. 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 The findings are compatible with multicentric breast cancer involving at least 2 quadrants over 9 cm region. There are additional highly suspicious microcalcifications identified between the calcifications sampled and the 8 o'clock axis posterior mass. Appropriate action should be taken for the multi centric breast cancer.

## 2023-05-23 NOTE — HISTORY OF PRESENT ILLNESS
[FreeTextEntry1] : Tanvi is a 68 yo female referred by Dr. 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  Of note, the patient's last breast imaging was in 2021.  She states that she was dealing with illness in her mother and subsequent death of her mother in 2022.  She was found to have scattered fibroglandular density.  There was a spiculated mass with calcifications at the 6 to 7 o'clock position 5 cm from the nipple.  There was a smaller mass in the more posterior 7 o'clock position 11 cm from the nipple.  There was an asymmetry in the right breast 6 cm from the nipple.  There was also pleomorphic calcifications in the mid to posterior right breast for which additional imaging was recommended.  The patient underwent right breast diagnostic mammogram and sonogram on 4/26/2023.  She was found to have the findings described above for which biopsy was recommended of all the sites.  The patient underwent right breast 6:00 biopsy of the 2.2 cm spiculated mass which returned as invasive ductal carcinoma, poorly differentiated, ER negative, MD negative, HER2 positive.  She also underwent right breast ultrasound-guided biopsy of the 8:00 0.9 cm mass.  Pathology returned as invasive ductal carcinoma, ER negative, MD negative, HER2 positive.  At the right breast 5 o'clock position stereotactic core biopsy was performed of the calcifications which returned as a microinvasive carcinoma with DCIS, high nuclear grade. Of note, there are highly suspicious calcifications identified between the calcifications sampled and the 8:00 posterior mass.  The findings are compatible with multicentric breast cancer involving at least 2 quadrants and spanning over 9 cm.  \par \par I discussed the diagnosis with the patient and her aunt at length.  We discussed that she has extensive disease in the breast.  Due to the size of the disease as well as the HER2 positivity, I recommended that she meet with medical oncology to discuss neoadjuvant chemotherapy.  I also recommended that she undergo bilateral breast MRI to evaluate both breast as well as the axilla.  The patient understands and agrees.  We briefly discussed that she would likely need mastectomy at the time of surgical planning but will follow-up with her after neoadjuvant chemotherapy.\par \par Other significant medical history: Patient is followed by a cardiologist? for? CAD?has not seen a cardiologist in 2018. Patient was previously seen by a neurologist for workup of possible brain aneurysm, but was not deemed an aneurysm and was discharged from their care.

## 2023-05-24 ENCOUNTER — NON-APPOINTMENT (OUTPATIENT)
Age: 68
End: 2023-05-24

## 2023-06-05 ENCOUNTER — NON-APPOINTMENT (OUTPATIENT)
Age: 68
End: 2023-06-05

## 2023-06-05 ENCOUNTER — APPOINTMENT (OUTPATIENT)
Dept: BREAST CENTER | Facility: CLINIC | Age: 68
End: 2023-06-05
Payer: MEDICARE

## 2023-06-05 ENCOUNTER — APPOINTMENT (OUTPATIENT)
Dept: HEMATOLOGY ONCOLOGY | Facility: CLINIC | Age: 68
End: 2023-06-05
Payer: MEDICARE

## 2023-06-05 ENCOUNTER — LABORATORY RESULT (OUTPATIENT)
Age: 68
End: 2023-06-05

## 2023-06-05 VITALS
SYSTOLIC BLOOD PRESSURE: 132 MMHG | RESPIRATION RATE: 18 BRPM | HEIGHT: 65 IN | BODY MASS INDEX: 34.49 KG/M2 | WEIGHT: 207 LBS | OXYGEN SATURATION: 96 % | HEART RATE: 83 BPM | DIASTOLIC BLOOD PRESSURE: 80 MMHG | TEMPERATURE: 98.4 F

## 2023-06-05 DIAGNOSIS — R92.8 OTHER ABNORMAL AND INCONCLUSIVE FINDINGS ON DIAGNOSTIC IMAGING OF BREAST: ICD-10-CM

## 2023-06-05 PROCEDURE — 99215 OFFICE O/P EST HI 40 MIN: CPT

## 2023-06-05 PROCEDURE — 36415 COLL VENOUS BLD VENIPUNCTURE: CPT

## 2023-06-05 PROCEDURE — 99205 OFFICE O/P NEW HI 60 MIN: CPT | Mod: 25

## 2023-06-05 NOTE — HISTORY OF PRESENT ILLNESS
[FreeTextEntry1] : Tanvi is a 68 yo female accompanied by her aunt, presents for follow up regarding newly diagnosed multicentric right breast invasive ductal carcinoma ER/VT negative, HER-2 positive The patient underwent bilateral screening mammogram on 4/13/2023.  Of note, the patient's last breast imaging was in 2021. She states that she was dealing with illness in her mother and subsequent death of her mother in 2022.  She was found to have scattered fibroglandular density.  There was a spiculated mass with calcifications at the 6 to 7 o'clock position 5 cm from the nipple.  There was a smaller mass in the more posterior 7 o'clock position 11 cm from the nipple.  There was an asymmetry in the right breast 6 cm from the nipple.  There was also pleomorphic calcifications in the mid to posterior right breast for which additional imaging was recommended.  The patient underwent right breast diagnostic mammogram and sonogram on 4/26/2023.  She was found to have the findings described above for which biopsy was recommended of all the sites.  The patient underwent right breast 6:00 biopsy of the 2.2 cm spiculated mass which returned as invasive ductal carcinoma, poorly differentiated, ER negative, VT negative, HER2 positive.  She also underwent right breast ultrasound-guided biopsy of the 8:00 0.9 cm mass.  Pathology returned as invasive ductal carcinoma, ER negative, VT negative, HER2 positive.  At the right breast 5 o'clock position stereotactic core biopsy was performed of the calcifications which returned as a microinvasive carcinoma with DCIS, high nuclear grade. Of note, there are highly suspicious calcifications identified between the calcifications sampled and the 8:00 posterior mass.  The findings are compatible with multicentric breast cancer involving at least 2 quadrants and spanning over 9 cm. Patient completed a breast MRI on 6/2/23, discussed case with Dr. Enriqueta Jordan (Radiologist), discussed there is extensive disease on the right breast with the dominant tumor abutting the skin, and two sites of suspicion on the left breast recommended for MRI biopsies.  I discussed the case with the patient and informed her that if we were to remove the area of skin abutting the tumor at the time of surgery we would need to know the exact area prior to neoadjuvant chemotherapy.  I spoke with Dr. Montgomery from radiation oncology who might be able to place a small tattoo in the area of concern.\par \par Patient met with Dr. Marsh today to discuss the indication for neoadjuvant chemotherapy, the plan is to proceed with chemotherapy in 2 weeks. \par \par Other significant medical history: Patient was previously followed by a cardiologist for CAD, has not seen a cardiologist in 2018. Patient was previously seen by a neurologist for workup of possible brain aneurysm, but was not deemed an aneurysm and was discharged from their care.

## 2023-06-05 NOTE — DATA REVIEWED
[FreeTextEntry1] : 4/13/23 ([facility]) B/l screening mammo: scattered fibroglandular density. There is a highly suspicious spiculated mass with calcifications in between 6 and 7:00 in the right breast 5 cm from the nipple (annotated) for which spot magnification CC/ML and full-field lateral views with tomosynthesis and ultrasound are recommended. \par There is a smaller mass in the more posterior 7:00 right breast 11 cm from the nipple (annotated) for which spot compression CC/MLO and ultrasound are recommended. \par There is a developing mass/asymmetry in the superior aspect of the right breast on the MLO view 6 cm from the nipple (annotated) for which a spot compression MLO view and ultrasound are recommended. \par Pleomorphic calcifications have developed in the middle to posterior right breast from 5:00 to 7:00 highly suspicious for DCIS (annotated) for which additional magnification CC/ML views are recommended.\par The left breast demonstrates no suspicious masses, calcifications, skin thickening or areas of distortion. BI-RADS 0. \par \par 4/26/23 ([facility]) right diag mammo and US: scattered fibroglandular density. 1. Right lower outer quadrant suspicious masses, ultrasound-guided biopsy, 2 sites to demonstrate at least multifocal disease. \par 2. Right lower inner quadrant fine pleomorphic calcifications, stereotactic guided, 1 site recommended, to evaluate for multicentric disease. BI-RADS 5. \par \par 5/16/23 ([facility]) 1. Breast, right, 6:30 4 cm FN, 2.2 cm spiculated mass; ultrasound-guided\par biopsy: - Invasive ductal carcinoma, poorly-differentiated associated with necrosis, measuring at least 12 mm in this material (see note)\par 2. Breast, right, 8:00 10 cm FN, 0.9 cm solid mass; ultrasound-guided biopsy: - Invasive ductal carcinoma, poorly-differentiated associated with mild lymphoid infiltrate, measuring at least 7 mm in this material, morphologically similar to part 1\par 3. 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 The findings are compatible with multicentric breast cancer involving at least 2 quadrants over 9 cm region. There are additional highly suspicious microcalcifications identified between the calcifications sampled and the 8 o'clock axis posterior mass. Appropriate action should be taken for the multi centric breast cancer. \par \par 6/2/23 ([facility]) b/l breast MRI: multicentric right breast malignancy, appearance of the MRI closely approximates appearance on post-biopsy imaging either the sites of concern spanned 8.7 x .3 x 8.1cm, dominant tumor 5-6:00 axis abuts but does not demonstrate skin involvement. No adenopathy. 2 indeterminate findings in the left breast 2:00 and 6:00 recommend 2 site left breast MRI biopsy. BIRADS 4

## 2023-06-05 NOTE — PHYSICAL EXAM
[Normocephalic] : normocephalic [EOMI] : extra ocular movement intact [Supple] : supple [Examined in the supine and seated position] : examined in the supine and seated position [No dominant masses] : no dominant masses left breast [No Nipple Retraction] : no left nipple retraction [No Nipple Discharge] : no left nipple discharge [No Axillary Lymphadenopathy] : no left axillary lymphadenopathy [No Rashes] : no rashes [de-identified] : Significant bruising throughout inferior aspect of right breast, likely from biopsies.  Nondescript firmness at the 6 o'clock position at the site of the 2.2 cm mass.  Likely obscured due to hematoma [de-identified] : Significant ecchymoses at the inferior aspect of right breast likely from biopsies

## 2023-06-05 NOTE — ASSESSMENT
[FreeTextEntry1] : 67-year-old female with newly diagnosed right breast invasive ductal carcinoma, ER negative, AL negative, HER2 positive spanning 9 cm across the inferior aspect of the breast undergoing neoadjuvant chemotherapy

## 2023-06-06 ENCOUNTER — OUTPATIENT (OUTPATIENT)
Dept: OUTPATIENT SERVICES | Facility: HOSPITAL | Age: 68
LOS: 1 days | End: 2023-06-06
Payer: MEDICARE

## 2023-06-06 DIAGNOSIS — Z98.890 OTHER SPECIFIED POSTPROCEDURAL STATES: Chronic | ICD-10-CM

## 2023-06-06 DIAGNOSIS — Z90.49 ACQUIRED ABSENCE OF OTHER SPECIFIED PARTS OF DIGESTIVE TRACT: Chronic | ICD-10-CM

## 2023-06-06 DIAGNOSIS — Z90.89 ACQUIRED ABSENCE OF OTHER ORGANS: Chronic | ICD-10-CM

## 2023-06-06 DIAGNOSIS — C50.911 MALIGNANT NEOPLASM OF UNSPECIFIED SITE OF RIGHT FEMALE BREAST: ICD-10-CM

## 2023-06-06 PROCEDURE — 93306 TTE W/DOPPLER COMPLETE: CPT | Mod: 26

## 2023-06-06 PROCEDURE — 93306 TTE W/DOPPLER COMPLETE: CPT

## 2023-06-06 PROCEDURE — 93356 MYOCRD STRAIN IMG SPCKL TRCK: CPT | Mod: 26

## 2023-06-08 ENCOUNTER — RESULT REVIEW (OUTPATIENT)
Age: 68
End: 2023-06-08

## 2023-06-09 LAB
APTT BLD: 58.2 SEC
FERRITIN SERPL-MCNC: 112 NG/ML
FOLATE SERPL-MCNC: 13.6 NG/ML
HBV CORE IGG+IGM SER QL: NONREACTIVE
HBV SURFACE AB SER QL: NONREACTIVE
HBV SURFACE AG SER QL: NONREACTIVE
HCV AB SER QL: NONREACTIVE
HCV S/CO RATIO: 0.12 S/CO
HIV1+2 AB SPEC QL IA.RAPID: NONREACTIVE
INR PPP: 1.06 RATIO
IRON SATN MFR SERPL: 33 %
IRON SERPL-MCNC: 118 UG/DL
PT BLD: 12.4 SEC
TIBC SERPL-MCNC: 362 UG/DL
UIBC SERPL-MCNC: 244 UG/DL
VIT B12 SERPL-MCNC: 458 PG/ML

## 2023-06-12 NOTE — HISTORY OF PRESENT ILLNESS
[de-identified] : Patient here for initial consultation. Feeling well.  [FreeTextEntry1] : Patient is a 66yo woman with h/o CAD (seen by cardiology in past for ?tachycardia as per patient but w/o follow up) referred by Dr. Roy for a newly diagnosed multicentric right breast invasive ductal carcinoma ER/KS negative, HER-2 positive. \par \par Patient presents to initial visit on 6/5/23 with her aunt. \par \par The patient's history began when she underwent bilateral screening mammogram on 4/13/2023.  Of note, the patient's last breast imaging was in 2021. She states that she was dealing with illness in her mother and subsequent death of her mother in 2022.  She was found to have scattered fibroglandular density.  There was a spiculated mass with calcifications at the 6 to 7 o'clock position 5 cm from the nipple.  There was a smaller mass in the more posterior 7 o'clock position 11 cm from the nipple.  There was an asymmetry in the right breast 6 cm from the nipple.  There was also pleomorphic calcifications in the mid to posterior right breast for which additional imaging was recommended.  The patient underwent right breast diagnostic mammogram and sonogram on 4/26/2023.  She was found to have the findings described above for which biopsy was recommended of all the sites.  The patient underwent right breast 6:00 biopsy of the 2.2 cm spiculated mass which returned as invasive ductal carcinoma, poorly differentiated, ER negative, KS negative, HER2 positive.  She also underwent right breast ultrasound-guided biopsy of the 8:00 0.9 cm mass.  Pathology returned as invasive ductal carcinoma, ER negative, KS negative, HER2 positive.  At the right breast 5 o'clock position stereotactic core biopsy was performed of the calcifications which returned as a microinvasive carcinoma with DCIS, high nuclear grade. Of note, there are highly suspicious calcifications identified between the calcifications sampled and the 8:00 posterior mass.  The findings are compatible with multicentric breast cancer involving at least 2 quadrants and spanning over 9 cm.  \par  MRI breast on 6/2/23 showed extensive disease in the right breast with the dominant tumor abutting the skin, and two sites of suspicion on the left breast recommended for MRI biopsies. Area of skin abutting the tumor will be marked by Dr Montgomery from radiation oncology as per Dr Roy prior to pt starting chemo.\par \par \par

## 2023-06-12 NOTE — ASSESSMENT
[FreeTextEntry1] : Patient is a 66yo woman with h/o CAD (seen by cardiology in past for ?tachycardia as per patient but w/o follow up) referred by Dr. Roy for a newly diagnosed multicentric right breast invasive ductal carcinoma ER/IN negative, HER-2 positive. On MRI breast patient's disease spans 9cm with suspicious calcifications in between biopsied masses c/w IDC ER/IN- Her 2+. \par \par Reviewed the biopsy results c/w ER/IN- Her2 positive breast cancer spanning a large part of patient's breast. I recommended neoadjuvant chemotherapy with dual Her 2 blockade with weekly Taxol/Carbo and Phesgo every 3 weeks over a 12 week period. I reviewed potential side effects including but not limited to allergic reactions, infusion reactions, fatigue, lowered immune system with lower blood counts, risk of neutropenic fever, needing growth factor support, nausea/vomiting, diarrhea, kidney and liver dysfunction, electrolyte abnormalities, peripheral neuropathy and reversible cardiomyopathy with dual her 2 agents. Patient expressed verbal understanding signing consent. \par \par Reviewed curative intent goal of care and needing an MRI breast after neoadj treatment with surgery following the repeat MRI breast to assess response. \par \par Plan prior to chemo:\par Echo\par Chemo port by IR\par \par Compazine prn nausea. \par \par RTC in 2 wks to start treatment.

## 2023-06-12 NOTE — REVIEW OF SYSTEMS
[Diarrhea: Grade 0] : Diarrhea: Grade 0 [Insomnia] : insomnia [Negative] : Allergic/Immunologic [FreeTextEntry9] : body aches d/t hip  [de-identified] : walks with a cane, needs R hip replacement, hx of L hip replacement

## 2023-06-14 LAB
ALBUMIN SERPL ELPH-MCNC: 4.7 G/DL
ALP BLD-CCNC: 93 U/L
ALT SERPL-CCNC: 15 U/L
ANION GAP SERPL CALC-SCNC: 30 MMOL/L
AST SERPL-CCNC: 24 U/L
BILIRUB SERPL-MCNC: 0.4 MG/DL
BUN SERPL-MCNC: 16 MG/DL
CALCIUM SERPL-MCNC: 8.6 MG/DL
CHLORIDE SERPL-SCNC: 95 MMOL/L
CO2 SERPL-SCNC: 18 MMOL/L
CREAT SERPL-MCNC: 0.91 MG/DL
EGFR: 68 ML/MIN/1.73M2
GLUCOSE SERPL-MCNC: NORMAL MG/DL
POTASSIUM SERPL-SCNC: >10 MMOL/L
PROT SERPL-MCNC: 7.9 G/DL
SODIUM SERPL-SCNC: 143 MMOL/L

## 2023-06-15 ENCOUNTER — NON-APPOINTMENT (OUTPATIENT)
Age: 68
End: 2023-06-15

## 2023-06-16 ENCOUNTER — LABORATORY RESULT (OUTPATIENT)
Age: 68
End: 2023-06-16

## 2023-06-16 ENCOUNTER — APPOINTMENT (OUTPATIENT)
Dept: HEMATOLOGY ONCOLOGY | Facility: CLINIC | Age: 68
End: 2023-06-16

## 2023-06-16 ENCOUNTER — NON-APPOINTMENT (OUTPATIENT)
Age: 68
End: 2023-06-16

## 2023-06-16 ENCOUNTER — APPOINTMENT (OUTPATIENT)
Dept: HEART AND VASCULAR | Facility: CLINIC | Age: 68
End: 2023-06-16
Payer: MEDICARE

## 2023-06-16 VITALS
DIASTOLIC BLOOD PRESSURE: 70 MMHG | TEMPERATURE: 97.7 F | HEIGHT: 66 IN | BODY MASS INDEX: 33.75 KG/M2 | SYSTOLIC BLOOD PRESSURE: 118 MMHG | HEART RATE: 97 BPM | OXYGEN SATURATION: 97 % | WEIGHT: 210 LBS

## 2023-06-16 PROCEDURE — 93000 ELECTROCARDIOGRAM COMPLETE: CPT

## 2023-06-16 PROCEDURE — 99205 OFFICE O/P NEW HI 60 MIN: CPT | Mod: 25

## 2023-06-16 NOTE — DISCUSSION/SUMMARY
[EKG obtained to assist in diagnosis and management of assessed problem(s)] : EKG obtained to assist in diagnosis and management of assessed problem(s) [FreeTextEntry1] : Patient is a 68yo woman with h/o CAD? (seen by cardiology in past for ?tachycardia as per patient but w/o follow up) OA, referred for cardiac evaluation prior to starting chemo after newly diagnosed multicentric right breast invasive ductal carcinoma ER/IN negative, HER-2 positive. \par \par - Her cardiac history is somewhat unclear as she was evaluated in 2018 after CT chest noted calcifications in LCx, but pt doesn't have report (thinks done w/ [facility], but nothing in [facility]) and after getting echo and NUC, reports she was told everything was normal. NUC did reveal normal myocardial perfusion, but could still have nonobs disease. Never put on statin or ASA. \par - pt is asymptomatic; would be unable to exercise on a treadmill based on her hip \par - no recent lipids; will add on to previous blood draw today w/ A1c; gave order to have Lp(a) checked another time to further risk stratify as it was unable to be added on to lab and did not want to restick pt \par - it appears although pt has risk factors including family history, obesity, possible preDM (pt thinks last A1c was 5.7%) it does not appear she has had poorly controlled risk factors for many years\par - will discuss w/ team after reviewing lab work the need for further ischemic eval and whether to pursue CCTA (possibly at [facility]) \par - we do not think there is any contraindication for her to begin chemo treatment next week as echo results reviewed and are normal, vitals are stable and pt is asymptomatic \par - will attempt to find CT report from 2018 (pt thinks perhaps done at [facility] radiology; will call). \par \par Case discussed w/ Dr. Sabillon.

## 2023-06-16 NOTE — REASON FOR VISIT
[CV Risk Factors and Non-Cardiac Disease] : CV risk factors and non-cardiac disease [FreeTextEntry3] : Dr. Brandee Marsh

## 2023-06-16 NOTE — HISTORY OF PRESENT ILLNESS
[FreeTextEntry1] : Patient is a 68yo woman with h/o CAD? (seen by cardiology in past for ?tachycardia as per patient but w/o follow up) OA, referred for cardiac evaluation prior to starting chemo after newly diagnosed multicentric right breast invasive ductal carcinoma ER/MS negative, HER-2 positive. \par \par On MRI breast patient's disease spans 9cm with suspicious calcifications in between biopsied masses c/w IDC ER/MS- Her 2+. \par Planned for neoadjuvant chemotherapy with dual Her 2 blockade with weekly Taxol/Carbo and Phesgo every 3 weeks over a 12 week period. Scheduled to start next Wednesday. \par \par Had echo w/ strain 6/6/23: normal RV and LV size and fx; LVEF 63%; LVGLS = -23.3%; no significant valvular disease \par \par Her cardiac history is somewhat unclear as she was evaluated in 2018 after CT chest noted calcifications in LCx, but pt doesn't have report (thinks done w/ [facility], but nothing in [facility]) and after getting echo and NUC, reports she was told everything was normal. NUC did reveal normal myocardial perfusion, but could still have nonobs disease. Never put on statin or ASA. \par \par Patient denies any chest pain, SOB, palpitations, orthopnea, PND or LE edema.\par \par Brother - stroke in his early 60s; DM\par father - fatal stroke in 80s\par \par PSH: L hip replacement\par cholecystectomy\par laminectomy \par \par Lifestyle History:\par Mediterranean Diet Score (9 question survey) was 5. Tries to limit red meat; eats lots of fruits and veggies. Likes ice cream. \par (8-9: optimal, 6-7: near-optimal, 4-5: suboptimal, 0-3: markedly suboptimal)\par Exercise: Patient reports exercising at a moderate level for 30-60 minutes per week. Limited by hip pain. Ambulates w/ a cane. Going to PT. \par Smoking: Former smoker (remote smoking for <5 years; quit 40 years ago). \par EtOH: very rarely \par Stress: Patient denies any significant stress. \par Sleep apnea: Low risk. \par \par Menstrual Hx: has no history of hormone replacement treatment. age at menarche was 8. age at menopause was 48. The cycles have been regular. \par Prior pregnancies: G4, P1  and Abortions:2. ( miscarriages: 2  ) Age at live birth: 27 \par \par \par

## 2023-06-19 LAB
APTT BLD: 30.9 SEC
INR PPP: 1.04
PT BLD: 12.4 SEC

## 2023-06-20 ENCOUNTER — OUTPATIENT (OUTPATIENT)
Dept: OUTPATIENT SERVICES | Facility: HOSPITAL | Age: 68
LOS: 1 days | End: 2023-06-20
Payer: MEDICARE

## 2023-06-20 ENCOUNTER — APPOINTMENT (OUTPATIENT)
Dept: INTERVENTIONAL RADIOLOGY/VASCULAR | Facility: HOSPITAL | Age: 68
End: 2023-06-20

## 2023-06-20 ENCOUNTER — NON-APPOINTMENT (OUTPATIENT)
Age: 68
End: 2023-06-20

## 2023-06-20 DIAGNOSIS — Z98.890 OTHER SPECIFIED POSTPROCEDURAL STATES: Chronic | ICD-10-CM

## 2023-06-20 DIAGNOSIS — Z90.49 ACQUIRED ABSENCE OF OTHER SPECIFIED PARTS OF DIGESTIVE TRACT: Chronic | ICD-10-CM

## 2023-06-20 DIAGNOSIS — Z90.89 ACQUIRED ABSENCE OF OTHER ORGANS: Chronic | ICD-10-CM

## 2023-06-20 PROCEDURE — 36561 INSERT TUNNELED CV CATH: CPT

## 2023-06-20 PROCEDURE — 77001 FLUOROGUIDE FOR VEIN DEVICE: CPT | Mod: 26

## 2023-06-20 PROCEDURE — C1769: CPT

## 2023-06-20 PROCEDURE — 76937 US GUIDE VASCULAR ACCESS: CPT

## 2023-06-20 PROCEDURE — 77001 FLUOROGUIDE FOR VEIN DEVICE: CPT

## 2023-06-20 PROCEDURE — C1788: CPT

## 2023-06-20 PROCEDURE — 76937 US GUIDE VASCULAR ACCESS: CPT | Mod: 26

## 2023-06-20 RX ORDER — ACETAMINOPHEN 500 MG
650 TABLET ORAL ONCE
Refills: 0 | Status: COMPLETED | OUTPATIENT
Start: 2023-07-19 | End: 2023-07-19

## 2023-06-20 RX ORDER — DEXAMETHASONE 0.5 MG/5ML
4 ELIXIR ORAL ONCE
Refills: 0 | Status: COMPLETED | OUTPATIENT
Start: 2023-07-05 | End: 2023-07-05

## 2023-06-20 RX ORDER — DIPHENHYDRAMINE HCL 50 MG
25 CAPSULE ORAL ONCE
Refills: 0 | Status: COMPLETED | OUTPATIENT
Start: 2023-07-05 | End: 2023-07-05

## 2023-06-20 RX ORDER — CARBOPLATIN 50 MG
207 VIAL (EA) INTRAVENOUS ONCE
Refills: 0 | Status: COMPLETED | OUTPATIENT
Start: 2023-07-05 | End: 2023-07-05

## 2023-06-20 RX ORDER — PALONOSETRON HYDROCHLORIDE 0.25 MG/5ML
0.25 INJECTION, SOLUTION INTRAVENOUS ONCE
Refills: 0 | Status: COMPLETED | OUTPATIENT
Start: 2023-07-19 | End: 2023-07-19

## 2023-06-20 RX ORDER — DIPHENHYDRAMINE HCL 50 MG
25 CAPSULE ORAL ONCE
Refills: 0 | Status: COMPLETED | OUTPATIENT
Start: 2023-06-28 | End: 2023-06-28

## 2023-06-20 RX ORDER — FAMOTIDINE 10 MG/ML
20 INJECTION INTRAVENOUS ONCE
Refills: 0 | Status: COMPLETED | OUTPATIENT
Start: 2023-07-19 | End: 2023-07-19

## 2023-06-20 RX ORDER — DEXAMETHASONE 0.5 MG/5ML
4 ELIXIR ORAL ONCE
Refills: 0 | Status: COMPLETED | OUTPATIENT
Start: 2023-07-19 | End: 2023-07-19

## 2023-06-20 RX ORDER — FAMOTIDINE 10 MG/ML
20 INJECTION INTRAVENOUS ONCE
Refills: 0 | Status: COMPLETED | OUTPATIENT
Start: 2023-06-28 | End: 2023-06-28

## 2023-06-20 RX ORDER — ACETAMINOPHEN 500 MG
650 TABLET ORAL ONCE
Refills: 0 | Status: COMPLETED | OUTPATIENT
Start: 2023-06-28 | End: 2023-06-28

## 2023-06-20 RX ORDER — PACLITAXEL 6 MG/ML
160 INJECTION, SOLUTION, CONCENTRATE INTRAVENOUS ONCE
Refills: 0 | Status: COMPLETED | OUTPATIENT
Start: 2023-07-05 | End: 2023-07-05

## 2023-06-20 RX ORDER — PALONOSETRON HYDROCHLORIDE 0.25 MG/5ML
0.25 INJECTION, SOLUTION INTRAVENOUS ONCE
Refills: 0 | Status: COMPLETED | OUTPATIENT
Start: 2023-06-28 | End: 2023-06-28

## 2023-06-20 RX ORDER — PACLITAXEL 6 MG/ML
160 INJECTION, SOLUTION, CONCENTRATE INTRAVENOUS ONCE
Refills: 0 | Status: COMPLETED | OUTPATIENT
Start: 2023-06-28 | End: 2023-06-28

## 2023-06-20 RX ORDER — DEXAMETHASONE 0.5 MG/5ML
4 ELIXIR ORAL ONCE
Refills: 0 | Status: COMPLETED | OUTPATIENT
Start: 2023-06-28 | End: 2023-06-28

## 2023-06-20 RX ORDER — CARBOPLATIN 50 MG
207 VIAL (EA) INTRAVENOUS ONCE
Refills: 0 | Status: COMPLETED | OUTPATIENT
Start: 2023-06-28 | End: 2023-06-28

## 2023-06-20 RX ORDER — DIPHENHYDRAMINE HCL 50 MG
25 CAPSULE ORAL ONCE
Refills: 0 | Status: COMPLETED | OUTPATIENT
Start: 2023-07-19 | End: 2023-07-19

## 2023-06-20 RX ORDER — FAMOTIDINE 10 MG/ML
20 INJECTION INTRAVENOUS ONCE
Refills: 0 | Status: COMPLETED | OUTPATIENT
Start: 2023-07-05 | End: 2023-07-05

## 2023-06-21 ENCOUNTER — OUTPATIENT (OUTPATIENT)
Dept: OUTPATIENT SERVICES | Facility: HOSPITAL | Age: 68
LOS: 1 days | End: 2023-06-21
Payer: MEDICARE

## 2023-06-21 ENCOUNTER — NON-APPOINTMENT (OUTPATIENT)
Age: 68
End: 2023-06-21

## 2023-06-21 ENCOUNTER — APPOINTMENT (OUTPATIENT)
Dept: HEMATOLOGY ONCOLOGY | Facility: CLINIC | Age: 68
End: 2023-06-21
Payer: MEDICARE

## 2023-06-21 ENCOUNTER — APPOINTMENT (OUTPATIENT)
Dept: INFUSION THERAPY | Facility: CLINIC | Age: 68
End: 2023-06-21

## 2023-06-21 VITALS
SYSTOLIC BLOOD PRESSURE: 124 MMHG | HEART RATE: 72 BPM | DIASTOLIC BLOOD PRESSURE: 73 MMHG | TEMPERATURE: 97 F | OXYGEN SATURATION: 98 % | RESPIRATION RATE: 18 BRPM

## 2023-06-21 DIAGNOSIS — Z98.890 OTHER SPECIFIED POSTPROCEDURAL STATES: Chronic | ICD-10-CM

## 2023-06-21 DIAGNOSIS — C50.911 MALIGNANT NEOPLASM OF UNSPECIFIED SITE OF RIGHT FEMALE BREAST: ICD-10-CM

## 2023-06-21 DIAGNOSIS — Z90.89 ACQUIRED ABSENCE OF OTHER ORGANS: Chronic | ICD-10-CM

## 2023-06-21 LAB
ALBUMIN SERPL ELPH-MCNC: 3.5 G/DL — SIGNIFICANT CHANGE UP (ref 3.3–5)
ALP SERPL-CCNC: 75 U/L — SIGNIFICANT CHANGE UP (ref 40–120)
ALT FLD-CCNC: 17 U/L — SIGNIFICANT CHANGE UP (ref 10–45)
ANION GAP SERPL CALC-SCNC: 3 MMOL/L — LOW (ref 5–17)
AST SERPL-CCNC: 22 U/L — SIGNIFICANT CHANGE UP (ref 10–40)
BILIRUB SERPL-MCNC: 0.7 MG/DL — SIGNIFICANT CHANGE UP (ref 0.2–1.2)
BUN SERPL-MCNC: 15 MG/DL — SIGNIFICANT CHANGE UP (ref 7–23)
CALCIUM SERPL-MCNC: 9.7 MG/DL — SIGNIFICANT CHANGE UP (ref 8.4–10.5)
CHLORIDE SERPL-SCNC: 108 MMOL/L — SIGNIFICANT CHANGE UP (ref 96–108)
CO2 SERPL-SCNC: 30 MMOL/L — SIGNIFICANT CHANGE UP (ref 22–31)
CREAT SERPL-MCNC: 0.7 MG/DL — SIGNIFICANT CHANGE UP (ref 0.5–1.3)
EGFR: 94 ML/MIN/1.73M2 — SIGNIFICANT CHANGE UP
GLUCOSE SERPL-MCNC: 147 MG/DL — HIGH (ref 70–99)
HCT VFR BLD CALC: 39.4 % — SIGNIFICANT CHANGE UP (ref 34.5–45)
HGB BLD-MCNC: 13.2 G/DL — SIGNIFICANT CHANGE UP (ref 11.5–15.5)
LYMPHOCYTES # BLD AUTO: 1 K/UL — SIGNIFICANT CHANGE UP (ref 1–3.3)
LYMPHOCYTES # BLD AUTO: 17.9 % — SIGNIFICANT CHANGE UP (ref 13–44)
MCHC RBC-ENTMCNC: 28.5 PG — SIGNIFICANT CHANGE UP (ref 27–34)
MCHC RBC-ENTMCNC: 33.5 GM/DL — SIGNIFICANT CHANGE UP (ref 32–36)
MCV RBC AUTO: 85.1 FL — SIGNIFICANT CHANGE UP (ref 80–100)
NEUTROPHILS # BLD AUTO: 4.5 K/UL — SIGNIFICANT CHANGE UP (ref 1.8–7.4)
NEUTROPHILS NFR BLD AUTO: 79.7 % — HIGH (ref 43–77)
PLATELET # BLD AUTO: 213 K/UL — SIGNIFICANT CHANGE UP (ref 150–400)
POTASSIUM SERPL-MCNC: 4.5 MMOL/L — SIGNIFICANT CHANGE UP (ref 3.5–5.3)
POTASSIUM SERPL-SCNC: 4.5 MMOL/L — SIGNIFICANT CHANGE UP (ref 3.5–5.3)
PROT SERPL-MCNC: 7.1 G/DL — SIGNIFICANT CHANGE UP (ref 6–8.3)
RBC # BLD: 4.63 M/UL — SIGNIFICANT CHANGE UP (ref 3.8–5.2)
RBC # FLD: 12.5 % — SIGNIFICANT CHANGE UP (ref 10.3–14.5)
SODIUM SERPL-SCNC: 141 MMOL/L — SIGNIFICANT CHANGE UP (ref 135–145)
WBC # BLD: 5.6 K/UL — SIGNIFICANT CHANGE UP (ref 3.8–10.5)
WBC # FLD AUTO: 5.6 K/UL — SIGNIFICANT CHANGE UP (ref 3.8–10.5)

## 2023-06-21 PROCEDURE — 96413 CHEMO IV INFUSION 1 HR: CPT

## 2023-06-21 PROCEDURE — 96417 CHEMO IV INFUS EACH ADDL SEQ: CPT

## 2023-06-21 PROCEDURE — 96401 CHEMO ANTI-NEOPL SQ/IM: CPT

## 2023-06-21 PROCEDURE — 96375 TX/PRO/DX INJ NEW DRUG ADDON: CPT

## 2023-06-21 PROCEDURE — 80053 COMPREHEN METABOLIC PANEL: CPT

## 2023-06-21 PROCEDURE — 99214 OFFICE O/P EST MOD 30 MIN: CPT

## 2023-06-21 PROCEDURE — 96415 CHEMO IV INFUSION ADDL HR: CPT

## 2023-06-21 PROCEDURE — 85025 COMPLETE CBC W/AUTO DIFF WBC: CPT

## 2023-06-21 PROCEDURE — 36415 COLL VENOUS BLD VENIPUNCTURE: CPT

## 2023-06-21 RX ORDER — FAMOTIDINE 10 MG/ML
20 INJECTION INTRAVENOUS ONCE
Refills: 0 | Status: COMPLETED | OUTPATIENT
Start: 2023-06-21 | End: 2023-06-21

## 2023-06-21 RX ORDER — PALONOSETRON HYDROCHLORIDE 0.25 MG/5ML
0.25 INJECTION, SOLUTION INTRAVENOUS ONCE
Refills: 0 | Status: COMPLETED | OUTPATIENT
Start: 2023-06-21 | End: 2023-06-21

## 2023-06-21 RX ORDER — PERTUZUMAB, TRASTUZUMAB, AND HYALURONIDASE-ZZXF 600; 600; 2000 MG/10ML; MG/10ML; U/10ML
15 INJECTION, SOLUTION SUBCUTANEOUS ONCE
Refills: 0 | Status: COMPLETED | OUTPATIENT
Start: 2023-06-21 | End: 2023-06-21

## 2023-06-21 RX ORDER — PACLITAXEL 6 MG/ML
160 INJECTION, SOLUTION, CONCENTRATE INTRAVENOUS ONCE
Refills: 0 | Status: COMPLETED | OUTPATIENT
Start: 2023-06-21 | End: 2023-06-21

## 2023-06-21 RX ORDER — LIDOCAINE AND PRILOCAINE CREAM 25; 25 MG/G; MG/G
1 CREAM TOPICAL ONCE
Refills: 0 | Status: COMPLETED | OUTPATIENT
Start: 2023-06-21 | End: 2023-06-21

## 2023-06-21 RX ORDER — DIPHENHYDRAMINE HCL 50 MG
25 CAPSULE ORAL ONCE
Refills: 0 | Status: COMPLETED | OUTPATIENT
Start: 2023-06-21 | End: 2023-06-21

## 2023-06-21 RX ORDER — CARBOPLATIN 50 MG
207 VIAL (EA) INTRAVENOUS ONCE
Refills: 0 | Status: COMPLETED | OUTPATIENT
Start: 2023-06-21 | End: 2023-06-21

## 2023-06-21 RX ORDER — DEXAMETHASONE 0.5 MG/5ML
10 ELIXIR ORAL ONCE
Refills: 0 | Status: COMPLETED | OUTPATIENT
Start: 2023-06-21 | End: 2023-06-21

## 2023-06-21 RX ORDER — ACETAMINOPHEN 500 MG
650 TABLET ORAL ONCE
Refills: 0 | Status: COMPLETED | OUTPATIENT
Start: 2023-06-21 | End: 2023-06-21

## 2023-06-21 RX ADMIN — Medication 650 MILLIGRAM(S): at 12:01

## 2023-06-21 RX ADMIN — LIDOCAINE AND PRILOCAINE CREAM 1 APPLICATION(S): 25; 25 CREAM TOPICAL at 15:00

## 2023-06-21 RX ADMIN — Medication 300 UNIT(S): at 15:00

## 2023-06-21 RX ADMIN — Medication 207 MILLIGRAM(S): at 13:30

## 2023-06-21 RX ADMIN — Medication 10 MILLIGRAM(S): at 11:30

## 2023-06-21 RX ADMIN — PACLITAXEL 160 MILLIGRAM(S): 6 INJECTION, SOLUTION, CONCENTRATE INTRAVENOUS at 11:50

## 2023-06-21 RX ADMIN — Medication 25 MILLIGRAM(S): at 11:45

## 2023-06-21 RX ADMIN — Medication 202 MILLIGRAM(S): at 11:30

## 2023-06-21 RX ADMIN — Medication 650 MILLIGRAM(S): at 11:01

## 2023-06-21 RX ADMIN — PALONOSETRON HYDROCHLORIDE 0.25 MILLIGRAM(S): 0.25 INJECTION, SOLUTION INTRAVENOUS at 11:45

## 2023-06-21 RX ADMIN — PACLITAXEL 160 MILLIGRAM(S): 6 INJECTION, SOLUTION, CONCENTRATE INTRAVENOUS at 14:00

## 2023-06-21 RX ADMIN — Medication 207 MILLIGRAM(S): at 13:00

## 2023-06-21 RX ADMIN — FAMOTIDINE 20 MILLIGRAM(S): 10 INJECTION INTRAVENOUS at 11:15

## 2023-06-21 RX ADMIN — FAMOTIDINE 208 MILLIGRAM(S): 10 INJECTION INTRAVENOUS at 11:00

## 2023-06-21 RX ADMIN — Medication 204 MILLIGRAM(S): at 11:15

## 2023-06-21 RX ADMIN — PERTUZUMAB, TRASTUZUMAB, AND HYALURONIDASE-ZZXF 15 MILLILITER(S): 600; 600; 2000 INJECTION, SOLUTION SUBCUTANEOUS at 10:43

## 2023-06-21 NOTE — HISTORY OF PRESENT ILLNESS
[de-identified] : Patient here to start treatment. Feeling well.  [FreeTextEntry1] : Patient is a 68yo woman with h/o CAD (seen by cardiology in past for ?tachycardia as per patient but w/o follow up) referred by Dr. Roy for a newly diagnosed multicentric right breast invasive ductal carcinoma ER/MT negative, HER-2 positive. Here today for f/u and first tx. \par \par Patient presents to initial visit on 6/5/23 with her aunt. \par \par The patient's history began when she underwent bilateral screening mammogram on 4/13/2023.  Of note, the patient's last breast imaging was in 2021. She states that she was dealing with illness in her mother and subsequent death of her mother in 2022.  She was found to have scattered fibroglandular density.  There was a spiculated mass with calcifications at the 6 to 7 o'clock position 5 cm from the nipple.  There was a smaller mass in the more posterior 7 o'clock position 11 cm from the nipple.  There was an asymmetry in the right breast 6 cm from the nipple.  There was also pleomorphic calcifications in the mid to posterior right breast for which additional imaging was recommended.  The patient underwent right breast diagnostic mammogram and sonogram on 4/26/2023.  She was found to have the findings described above for which biopsy was recommended of all the sites.  The patient underwent right breast 6:00 biopsy of the 2.2 cm spiculated mass which returned as invasive ductal carcinoma, poorly differentiated, ER negative, MT negative, HER2 positive.  She also underwent right breast ultrasound-guided biopsy of the 8:00 0.9 cm mass.  Pathology returned as invasive ductal carcinoma, ER negative, MT negative, HER2 positive.  At the right breast 5 o'clock position stereotactic core biopsy was performed of the calcifications which returned as a microinvasive carcinoma with DCIS, high nuclear grade. Of note, there are highly suspicious calcifications identified between the calcifications sampled and the 8:00 posterior mass.  The findings are compatible with multicentric breast cancer involving at least 2 quadrants and spanning over 9 cm.  \par  MRI breast on 6/2/23 showed extensive disease in the right breast with the dominant tumor abutting the skin, and two sites of suspicion on the left breast recommended for MRI biopsies. Area of skin abutting the tumor will be marked by Dr Montgomery from radiation oncology as per Dr Roy prior to pt starting chemo.\par \par \par

## 2023-06-21 NOTE — REVIEW OF SYSTEMS
[Diarrhea: Grade 0] : Diarrhea: Grade 0 [Insomnia] : insomnia [Negative] : Allergic/Immunologic [FreeTextEntry9] : body aches d/t hip  [de-identified] : walks with a cane, needs R hip replacement, hx of L hip replacement

## 2023-06-21 NOTE — ASSESSMENT
[FreeTextEntry1] : Patient is a 68yo woman with h/o CAD (seen by cardiology in past for ?tachycardia as per patient but w/o follow up) referred by Dr. 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 On MRI breast patient's disease spans 9cm with suspicious calcifications in between biopsied masses c/w IDC ER/TX- Her 2+. \par \par Reviewed the biopsy results c/w ER/TX- Her2 positive breast cancer spanning a large part of patient's breast. I recommended neoadjuvant chemotherapy with dual Her 2 blockade with weekly Taxol/Carbo and Phesgo every 3 weeks over a 12 week period. I reviewed potential side effects including but not limited to allergic reactions, infusion reactions, fatigue, lowered immune system with lower blood counts, risk of neutropenic fever, needing growth factor support, nausea/vomiting, diarrhea, kidney and liver dysfunction, electrolyte abnormalities, peripheral neuropathy and reversible cardiomyopathy with dual her 2 agents. Patient expressed verbal understanding signing consent. \par \par Reviewed curative intent goal of care and needing an MRI breast after neoadj treatment with surgery following the repeat MRI breast to assess response. \par \par Plan prior to chemo:\par Echo - normal EF 5/2023.\par Chemo port by IR -placed at [facility] IR.\par \par 6/21/23 Visit - Starting weekly carbo/taxol and phesgo [id number].\par Compazine prn nausea. 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 I reviewed that treatment with Taxol is standard of care and asked her to keep a daily journal documenting her symptoms of numbness/tingling in her hands and feet. I reviewed with her that should she develop neuropathy we will have to re-evaluate risks/benefits of continuing treatment with weekly Taxol and may have to adjust therapy. \par Also discussed that this is dose dependent and usually occurs with later weeks of treatment. \par To monitor closely for any signs and symptoms of peripheral neuropathy. \par Patient had all her questions answered regarding treatment and signed consent form. \par Plan on checking Echos every 3 months. \par \par RTC in 1 week for treatment and 2 wks for follow up.

## 2023-06-21 NOTE — PHYSICAL EXAM
[Restricted in physically strenuous activity but ambulatory and able to carry out work of a light or sedentary nature] : Status 1- Restricted in physically strenuous activity but ambulatory and able to carry out work of a light or sedentary nature, e.g., light house work, office work [Normal] : affect appropriate [de-identified] : palpable 2-3cm hard mobile mass in right breast at about 6 oclock position 5cm from nipple

## 2023-06-22 ENCOUNTER — NON-APPOINTMENT (OUTPATIENT)
Age: 68
End: 2023-06-22

## 2023-06-26 ENCOUNTER — APPOINTMENT (OUTPATIENT)
Dept: HEMATOLOGY ONCOLOGY | Facility: CLINIC | Age: 68
End: 2023-06-26
Payer: MEDICARE

## 2023-06-26 PROCEDURE — 90791 PSYCH DIAGNOSTIC EVALUATION: CPT

## 2023-06-27 ENCOUNTER — APPOINTMENT (OUTPATIENT)
Dept: MRI IMAGING | Facility: CLINIC | Age: 68
End: 2023-06-27
Payer: MEDICARE

## 2023-06-27 ENCOUNTER — APPOINTMENT (OUTPATIENT)
Dept: INTERVENTIONAL RADIOLOGY/VASCULAR | Facility: HOSPITAL | Age: 68
End: 2023-06-27

## 2023-06-27 ENCOUNTER — RESULT REVIEW (OUTPATIENT)
Age: 68
End: 2023-06-27

## 2023-06-27 ENCOUNTER — OUTPATIENT (OUTPATIENT)
Dept: OUTPATIENT SERVICES | Facility: HOSPITAL | Age: 68
LOS: 1 days | End: 2023-06-27

## 2023-06-27 DIAGNOSIS — Z90.89 ACQUIRED ABSENCE OF OTHER ORGANS: Chronic | ICD-10-CM

## 2023-06-27 DIAGNOSIS — Z90.49 ACQUIRED ABSENCE OF OTHER SPECIFIED PARTS OF DIGESTIVE TRACT: Chronic | ICD-10-CM

## 2023-06-27 DIAGNOSIS — Z98.890 OTHER SPECIFIED POSTPROCEDURAL STATES: Chronic | ICD-10-CM

## 2023-06-27 PROCEDURE — 77065 DX MAMMO INCL CAD UNI: CPT | Mod: 26,LT

## 2023-06-27 PROCEDURE — 19085 BX BREAST 1ST LESION MR IMAG: CPT | Mod: LT

## 2023-06-28 ENCOUNTER — APPOINTMENT (OUTPATIENT)
Dept: INFUSION THERAPY | Facility: CLINIC | Age: 68
End: 2023-06-28

## 2023-06-28 ENCOUNTER — OUTPATIENT (OUTPATIENT)
Dept: OUTPATIENT SERVICES | Facility: HOSPITAL | Age: 68
LOS: 1 days | End: 2023-06-28
Payer: MEDICARE

## 2023-06-28 VITALS
HEIGHT: 64.96 IN | RESPIRATION RATE: 16 BRPM | OXYGEN SATURATION: 99 % | HEART RATE: 74 BPM | TEMPERATURE: 99 F | DIASTOLIC BLOOD PRESSURE: 74 MMHG | SYSTOLIC BLOOD PRESSURE: 132 MMHG | WEIGHT: 205.03 LBS

## 2023-06-28 VITALS
RESPIRATION RATE: 17 BRPM | TEMPERATURE: 98 F | OXYGEN SATURATION: 98 % | SYSTOLIC BLOOD PRESSURE: 130 MMHG | DIASTOLIC BLOOD PRESSURE: 76 MMHG | HEART RATE: 76 BPM

## 2023-06-28 DIAGNOSIS — Z90.89 ACQUIRED ABSENCE OF OTHER ORGANS: Chronic | ICD-10-CM

## 2023-06-28 DIAGNOSIS — C50.911 MALIGNANT NEOPLASM OF UNSPECIFIED SITE OF RIGHT FEMALE BREAST: ICD-10-CM

## 2023-06-28 DIAGNOSIS — Z90.49 ACQUIRED ABSENCE OF OTHER SPECIFIED PARTS OF DIGESTIVE TRACT: Chronic | ICD-10-CM

## 2023-06-28 DIAGNOSIS — Z98.890 OTHER SPECIFIED POSTPROCEDURAL STATES: Chronic | ICD-10-CM

## 2023-06-28 LAB
ALBUMIN SERPL ELPH-MCNC: 3.4 G/DL — SIGNIFICANT CHANGE UP (ref 3.3–5)
ALP SERPL-CCNC: 74 U/L — SIGNIFICANT CHANGE UP (ref 40–120)
ALT FLD-CCNC: 22 U/L — SIGNIFICANT CHANGE UP (ref 10–45)
ANION GAP SERPL CALC-SCNC: 8 MMOL/L — SIGNIFICANT CHANGE UP (ref 5–17)
AST SERPL-CCNC: 26 U/L — SIGNIFICANT CHANGE UP (ref 10–40)
BILIRUB SERPL-MCNC: 0.5 MG/DL — SIGNIFICANT CHANGE UP (ref 0.2–1.2)
BUN SERPL-MCNC: 20 MG/DL — SIGNIFICANT CHANGE UP (ref 7–23)
CALCIUM SERPL-MCNC: 9 MG/DL — SIGNIFICANT CHANGE UP (ref 8.4–10.5)
CHLORIDE SERPL-SCNC: 107 MMOL/L — SIGNIFICANT CHANGE UP (ref 96–108)
CO2 SERPL-SCNC: 29 MMOL/L — SIGNIFICANT CHANGE UP (ref 22–31)
CREAT SERPL-MCNC: 0.8 MG/DL — SIGNIFICANT CHANGE UP (ref 0.5–1.3)
EGFR: 80 ML/MIN/1.73M2 — SIGNIFICANT CHANGE UP
GLUCOSE SERPL-MCNC: 111 MG/DL — HIGH (ref 70–99)
HCT VFR BLD CALC: 36.6 % — SIGNIFICANT CHANGE UP (ref 34.5–45)
HGB BLD-MCNC: 12.1 G/DL — SIGNIFICANT CHANGE UP (ref 11.5–15.5)
LYMPHOCYTES # BLD AUTO: 1.4 K/UL — SIGNIFICANT CHANGE UP (ref 1–3.3)
LYMPHOCYTES # BLD AUTO: 25.9 % — SIGNIFICANT CHANGE UP (ref 13–44)
MCHC RBC-ENTMCNC: 28.1 PG — SIGNIFICANT CHANGE UP (ref 27–34)
MCHC RBC-ENTMCNC: 33.1 GM/DL — SIGNIFICANT CHANGE UP (ref 32–36)
MCV RBC AUTO: 85.1 FL — SIGNIFICANT CHANGE UP (ref 80–100)
NEUTROPHILS # BLD AUTO: 3.7 K/UL — SIGNIFICANT CHANGE UP (ref 1.8–7.4)
NEUTROPHILS NFR BLD AUTO: 68.4 % — SIGNIFICANT CHANGE UP (ref 43–77)
PLATELET # BLD AUTO: 230 K/UL — SIGNIFICANT CHANGE UP (ref 150–400)
POTASSIUM SERPL-MCNC: 4.5 MMOL/L — SIGNIFICANT CHANGE UP (ref 3.5–5.3)
POTASSIUM SERPL-SCNC: 4.5 MMOL/L — SIGNIFICANT CHANGE UP (ref 3.5–5.3)
PROT SERPL-MCNC: 7.3 G/DL — SIGNIFICANT CHANGE UP (ref 6–8.3)
RBC # BLD: 4.3 M/UL — SIGNIFICANT CHANGE UP (ref 3.8–5.2)
RBC # FLD: 12 % — SIGNIFICANT CHANGE UP (ref 10.3–14.5)
SODIUM SERPL-SCNC: 144 MMOL/L — SIGNIFICANT CHANGE UP (ref 135–145)
WBC # BLD: 5.4 K/UL — SIGNIFICANT CHANGE UP (ref 3.8–10.5)
WBC # FLD AUTO: 5.4 K/UL — SIGNIFICANT CHANGE UP (ref 3.8–10.5)

## 2023-06-28 PROCEDURE — 96375 TX/PRO/DX INJ NEW DRUG ADDON: CPT

## 2023-06-28 PROCEDURE — 36415 COLL VENOUS BLD VENIPUNCTURE: CPT

## 2023-06-28 PROCEDURE — 96417 CHEMO IV INFUS EACH ADDL SEQ: CPT

## 2023-06-28 PROCEDURE — 85025 COMPLETE CBC W/AUTO DIFF WBC: CPT

## 2023-06-28 PROCEDURE — 96413 CHEMO IV INFUSION 1 HR: CPT

## 2023-06-28 PROCEDURE — 80053 COMPREHEN METABOLIC PANEL: CPT

## 2023-06-28 RX ADMIN — PACLITAXEL 160 MILLIGRAM(S): 6 INJECTION, SOLUTION, CONCENTRATE INTRAVENOUS at 16:35

## 2023-06-28 RX ADMIN — FAMOTIDINE 20 MILLIGRAM(S): 10 INJECTION INTRAVENOUS at 15:04

## 2023-06-28 RX ADMIN — Medication 207 MILLIGRAM(S): at 17:10

## 2023-06-28 RX ADMIN — Medication 207 MILLIGRAM(S): at 16:40

## 2023-06-28 RX ADMIN — Medication 650 MILLIGRAM(S): at 15:09

## 2023-06-28 RX ADMIN — PACLITAXEL 160 MILLIGRAM(S): 6 INJECTION, SOLUTION, CONCENTRATE INTRAVENOUS at 15:35

## 2023-06-28 RX ADMIN — Medication 202 MILLIGRAM(S): at 15:20

## 2023-06-28 RX ADMIN — Medication 300 UNIT(S): at 17:11

## 2023-06-28 RX ADMIN — Medication 204 MILLIGRAM(S): at 15:05

## 2023-06-28 RX ADMIN — PALONOSETRON HYDROCHLORIDE 0.25 MILLIGRAM(S): 0.25 INJECTION, SOLUTION INTRAVENOUS at 14:45

## 2023-06-28 RX ADMIN — FAMOTIDINE 208 MILLIGRAM(S): 10 INJECTION INTRAVENOUS at 14:50

## 2023-06-28 RX ADMIN — Medication 25 MILLIGRAM(S): at 15:34

## 2023-06-28 RX ADMIN — Medication 4 MILLIGRAM(S): at 15:19

## 2023-06-28 RX ADMIN — Medication 650 MILLIGRAM(S): at 15:05

## 2023-07-03 ENCOUNTER — NON-APPOINTMENT (OUTPATIENT)
Age: 68
End: 2023-07-03

## 2023-07-05 ENCOUNTER — OUTPATIENT (OUTPATIENT)
Dept: OUTPATIENT SERVICES | Facility: HOSPITAL | Age: 68
LOS: 1 days | End: 2023-07-05
Payer: MEDICARE

## 2023-07-05 ENCOUNTER — LABORATORY RESULT (OUTPATIENT)
Age: 68
End: 2023-07-05

## 2023-07-05 ENCOUNTER — APPOINTMENT (OUTPATIENT)
Dept: HEMATOLOGY ONCOLOGY | Facility: CLINIC | Age: 68
End: 2023-07-05
Payer: MEDICARE

## 2023-07-05 ENCOUNTER — APPOINTMENT (OUTPATIENT)
Dept: INFUSION THERAPY | Facility: CLINIC | Age: 68
End: 2023-07-05

## 2023-07-05 ENCOUNTER — NON-APPOINTMENT (OUTPATIENT)
Age: 68
End: 2023-07-05

## 2023-07-05 VITALS
HEIGHT: 66 IN | BODY MASS INDEX: 32.14 KG/M2 | TEMPERATURE: 98.1 F | DIASTOLIC BLOOD PRESSURE: 79 MMHG | SYSTOLIC BLOOD PRESSURE: 117 MMHG | RESPIRATION RATE: 18 BRPM | WEIGHT: 200 LBS | OXYGEN SATURATION: 97 % | HEART RATE: 101 BPM

## 2023-07-05 VITALS
RESPIRATION RATE: 18 BRPM | SYSTOLIC BLOOD PRESSURE: 154 MMHG | OXYGEN SATURATION: 98 % | TEMPERATURE: 98 F | DIASTOLIC BLOOD PRESSURE: 84 MMHG | HEART RATE: 90 BPM

## 2023-07-05 VITALS
HEIGHT: 66 IN | TEMPERATURE: 98 F | HEART RATE: 101 BPM | OXYGEN SATURATION: 97 % | SYSTOLIC BLOOD PRESSURE: 117 MMHG | DIASTOLIC BLOOD PRESSURE: 79 MMHG | RESPIRATION RATE: 18 BRPM | WEIGHT: 199.96 LBS

## 2023-07-05 DIAGNOSIS — Z98.890 OTHER SPECIFIED POSTPROCEDURAL STATES: Chronic | ICD-10-CM

## 2023-07-05 DIAGNOSIS — Z90.89 ACQUIRED ABSENCE OF OTHER ORGANS: Chronic | ICD-10-CM

## 2023-07-05 DIAGNOSIS — Z90.49 ACQUIRED ABSENCE OF OTHER SPECIFIED PARTS OF DIGESTIVE TRACT: Chronic | ICD-10-CM

## 2023-07-05 DIAGNOSIS — C50.911 MALIGNANT NEOPLASM OF UNSPECIFIED SITE OF RIGHT FEMALE BREAST: ICD-10-CM

## 2023-07-05 LAB
ALBUMIN SERPL ELPH-MCNC: 3.9 G/DL
ALP BLD-CCNC: 67 U/L
ALT SERPL-CCNC: 24 U/L
ANION GAP SERPL CALC-SCNC: 1 MMOL/L
AST SERPL-CCNC: 25 U/L
BILIRUB SERPL-MCNC: 0.6 MG/DL
BUN SERPL-MCNC: 16 MG/DL
CALCIUM SERPL-MCNC: 9.7 MG/DL
CHLORIDE SERPL-SCNC: 110 MMOL/L
CO2 SERPL-SCNC: 27 MMOL/L
CREAT SERPL-MCNC: 0.9 MG/DL
EGFR: 69 ML/MIN/1.73M2
GLUCOSE SERPL-MCNC: 138 MG/DL
POTASSIUM SERPL-SCNC: 4.5 MMOL/L
PROT SERPL-MCNC: 7.5 G/DL
SODIUM SERPL-SCNC: 138 MMOL/L

## 2023-07-05 PROCEDURE — 96417 CHEMO IV INFUS EACH ADDL SEQ: CPT

## 2023-07-05 PROCEDURE — 36415 COLL VENOUS BLD VENIPUNCTURE: CPT

## 2023-07-05 PROCEDURE — 96413 CHEMO IV INFUSION 1 HR: CPT

## 2023-07-05 PROCEDURE — 99214 OFFICE O/P EST MOD 30 MIN: CPT | Mod: 25

## 2023-07-05 PROCEDURE — 96375 TX/PRO/DX INJ NEW DRUG ADDON: CPT

## 2023-07-05 RX ORDER — PALONOSETRON HYDROCHLORIDE 0.25 MG/5ML
0.25 INJECTION, SOLUTION INTRAVENOUS ONCE
Refills: 0 | Status: COMPLETED | OUTPATIENT
Start: 2023-07-05 | End: 2023-07-05

## 2023-07-05 RX ORDER — ACETAMINOPHEN 500 MG
650 TABLET ORAL ONCE
Refills: 0 | Status: COMPLETED | OUTPATIENT
Start: 2023-07-05 | End: 2023-07-05

## 2023-07-05 RX ADMIN — PACLITAXEL 160 MILLIGRAM(S): 6 INJECTION, SOLUTION, CONCENTRATE INTRAVENOUS at 15:09

## 2023-07-05 RX ADMIN — Medication 650 MILLIGRAM(S): at 14:30

## 2023-07-05 RX ADMIN — Medication 207 MILLIGRAM(S): at 16:10

## 2023-07-05 RX ADMIN — Medication 300 UNIT(S): at 16:40

## 2023-07-05 RX ADMIN — PALONOSETRON HYDROCHLORIDE 0.25 MILLIGRAM(S): 0.25 INJECTION, SOLUTION INTRAVENOUS at 13:54

## 2023-07-05 RX ADMIN — FAMOTIDINE 208 MILLIGRAM(S): 10 INJECTION INTRAVENOUS at 14:15

## 2023-07-05 RX ADMIN — FAMOTIDINE 20 MILLIGRAM(S): 10 INJECTION INTRAVENOUS at 14:30

## 2023-07-05 RX ADMIN — PACLITAXEL 160 MILLIGRAM(S): 6 INJECTION, SOLUTION, CONCENTRATE INTRAVENOUS at 16:09

## 2023-07-05 RX ADMIN — Medication 207 MILLIGRAM(S): at 16:40

## 2023-07-05 RX ADMIN — Medication 25 MILLIGRAM(S): at 15:00

## 2023-07-05 RX ADMIN — Medication 4 MILLIGRAM(S): at 14:45

## 2023-07-05 RX ADMIN — Medication 202 MILLIGRAM(S): at 14:45

## 2023-07-05 RX ADMIN — Medication 204 MILLIGRAM(S): at 14:30

## 2023-07-05 RX ADMIN — Medication 650 MILLIGRAM(S): at 13:54

## 2023-07-05 NOTE — REVIEW OF SYSTEMS
[Negative] : Allergic/Immunologic [Fatigue] : fatigue [Diarrhea: Grade 4 - Life-threatening consequences; urgent intervention indicated] : Diarrhea: Grade 4 - Life-threatening consequences; urgent intervention indicated [FreeTextEntry9] : mild ongoing body aches d/t hip  [de-identified] : walks with a cane, needs R hip replacement, hx of L hip replacement

## 2023-07-05 NOTE — ASSESSMENT
[FreeTextEntry1] : Patient is a 66yo woman with h/o CAD (seen by cardiology in past for ?tachycardia as per patient but w/o follow up) referred by Dr. Roy for a newly diagnosed multicentric right breast invasive ductal carcinoma ER/[place] negative, HER-2 positive. On MRI breast patient's disease spans 9cm with suspicious calcifications in between biopsied masses c/w IDC ER/NC- Her 2+. \par \par cT3 (area of concern measuring 8.7cm on MRI, possibly multicentric) cN0 Stage IIB\par \par Reviewed the biopsy results c/w ER/NC- Her2 positive breast cancer spanning a large part of patient's breast. I recommended neoadjuvant chemotherapy with dual Her 2 blockade with weekly Taxol/Carbo and Phesgo every 3 weeks over a 12 week period. I reviewed potential side effects including but not limited to allergic reactions, infusion reactions, fatigue, lowered immune system with lower blood counts, risk of neutropenic fever, needing growth factor support, nausea/vomiting, diarrhea, kidney and liver dysfunction, electrolyte abnormalities, peripheral neuropathy and reversible cardiomyopathy with dual her 2 agents. Patient expressed verbal understanding signing consent. \par \par Reviewed curative intent goal of care and needing an MRI breast after neoadj treatment with surgery following the repeat MRI breast to assess response. \par \par Plan prior to chemo:\par Echo - normal EF 5/2023.\par Chemo port by IR -placed at [facility] IR.\par \par 6/21/23 Visit - Starting weekly carbo/taxol and phesgo [id number].\par Compazine prn nausea. 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 I reviewed that treatment with Taxol is standard of care and asked her to keep a daily journal documenting her symptoms of numbness/tingling in her hands and feet. I reviewed with her that should she develop neuropathy we will have to re-evaluate risks/benefits of continuing treatment with weekly Taxol and may have to adjust therapy. \par Also discussed that this is dose dependent and usually occurs with later weeks of treatment. \par To monitor closely for any signs and symptoms of peripheral neuropathy. \par Patient had all her questions answered regarding treatment and signed consent form. \par Plan on checking Echos every 3 months. \par \par 7/5/23 Visit - with response to treatment on exam, w/o palpable lesion in right breast. \par To continue recommended treatment as planned with weekly carbo/taxol and phesgo every 21days. \par Compazine prn nausea. \par To collect stool for ova and parasites, CDiff, and culture. \par To use immodium prn loose stools. reviewed instructions with patient, max dose per day 16mg. \par To monitor closely for any signs and symptoms of peripheral neuropathy. \par \par RTC in 1 week for treatment and 2 wks for follow up.

## 2023-07-05 NOTE — HISTORY OF PRESENT ILLNESS
[de-identified] : Patient here for f/u and C3. Reports queasiness, fatigue and diarrhea. 3-4 loose stools since last treatment, denies blood in stool. No Imodium taken. Reports loss of appetite, but staying hydrated. Denies fevers, SOB and CP. \par \par  [FreeTextEntry1] : Patient is a 66yo woman with h/o CAD (seen by cardiology in past for ?tachycardia as per patient but w/o follow up) referred by Dr. Roy for a newly diagnosed multicentric right breast invasive ductal carcinoma ER/OK negative, HER-2 positive. Here today for f/u and tx. \par \par Patient presents to initial visit on 6/5/23 with her aunt. \par \par The patient's history began when she underwent bilateral screening mammogram on 4/13/2023.  Of note, the patient's last breast imaging was in 2021. She states that she was dealing with illness in her mother and subsequent death of her mother in 2022.  She was found to have scattered fibroglandular density.  There was a spiculated mass with calcifications at the 6 to 7 o'clock position 5 cm from the nipple.  There was a smaller mass in the more posterior 7 o'clock position 11 cm from the nipple.  There was an asymmetry in the right breast 6 cm from the nipple.  There was also pleomorphic calcifications in the mid to posterior right breast for which additional imaging was recommended.  The patient underwent right breast diagnostic mammogram and sonogram on 4/26/2023.  She was found to have the findings described above for which biopsy was recommended of all the sites.  The patient underwent right breast 6:00 biopsy of the 2.2 cm spiculated mass which returned as invasive ductal carcinoma, poorly differentiated, ER negative, OK negative, HER2 positive.  She also underwent right breast ultrasound-guided biopsy of the 8:00 0.9 cm mass.  Pathology returned as invasive ductal carcinoma, ER negative, OK negative, HER2 positive.  At the right breast 5 o'clock position stereotactic core biopsy was performed of the calcifications which returned as a microinvasive carcinoma with DCIS, high nuclear grade. Of note, there are highly suspicious calcifications identified between the calcifications sampled and the 8:00 posterior mass.  The findings are compatible with multicentric breast cancer involving at least 2 quadrants and spanning over 9 cm.  \par  MRI breast on 6/2/23 showed extensive disease in the right breast with the dominant tumor abutting the skin, and two sites of suspicion on the left breast recommended for MRI biopsies. Area of skin abutting the tumor will be marked by Dr Montgomery from radiation oncology as per Dr Roy prior to pt starting chemo.\par \par \par

## 2023-07-05 NOTE — PHYSICAL EXAM
[Restricted in physically strenuous activity but ambulatory and able to carry out work of a light or sedentary nature] : Status 1- Restricted in physically strenuous activity but ambulatory and able to carry out work of a light or sedentary nature, e.g., light house work, office work [Normal] : affect appropriate [de-identified] : palpable 2-3cm hard mobile mass in right breast at about 6 oclock position 5cm from nipple - no longer palpable as of 7/5/23

## 2023-07-10 ENCOUNTER — NON-APPOINTMENT (OUTPATIENT)
Age: 68
End: 2023-07-10

## 2023-07-10 ENCOUNTER — APPOINTMENT (OUTPATIENT)
Dept: HEMATOLOGY ONCOLOGY | Facility: CLINIC | Age: 68
End: 2023-07-10
Payer: MEDICARE

## 2023-07-10 PROCEDURE — 90834 PSYTX W PT 45 MINUTES: CPT

## 2023-07-12 ENCOUNTER — APPOINTMENT (OUTPATIENT)
Dept: INFUSION THERAPY | Facility: CLINIC | Age: 68
End: 2023-07-12

## 2023-07-12 ENCOUNTER — OUTPATIENT (OUTPATIENT)
Dept: OUTPATIENT SERVICES | Facility: HOSPITAL | Age: 68
LOS: 1 days | End: 2023-07-12
Payer: MEDICARE

## 2023-07-12 VITALS
DIASTOLIC BLOOD PRESSURE: 76 MMHG | RESPIRATION RATE: 18 BRPM | OXYGEN SATURATION: 96 % | TEMPERATURE: 98 F | HEART RATE: 83 BPM | SYSTOLIC BLOOD PRESSURE: 117 MMHG

## 2023-07-12 VITALS
HEIGHT: 66 IN | TEMPERATURE: 98 F | SYSTOLIC BLOOD PRESSURE: 113 MMHG | HEART RATE: 77 BPM | WEIGHT: 201.06 LBS | DIASTOLIC BLOOD PRESSURE: 74 MMHG | RESPIRATION RATE: 18 BRPM | OXYGEN SATURATION: 99 %

## 2023-07-12 DIAGNOSIS — Z98.890 OTHER SPECIFIED POSTPROCEDURAL STATES: Chronic | ICD-10-CM

## 2023-07-12 DIAGNOSIS — C50.911 MALIGNANT NEOPLASM OF UNSPECIFIED SITE OF RIGHT FEMALE BREAST: ICD-10-CM

## 2023-07-12 DIAGNOSIS — Z90.89 ACQUIRED ABSENCE OF OTHER ORGANS: Chronic | ICD-10-CM

## 2023-07-12 DIAGNOSIS — Z90.49 ACQUIRED ABSENCE OF OTHER SPECIFIED PARTS OF DIGESTIVE TRACT: Chronic | ICD-10-CM

## 2023-07-12 LAB
ALBUMIN SERPL ELPH-MCNC: 4 G/DL — SIGNIFICANT CHANGE UP (ref 3.3–5)
ALP SERPL-CCNC: 72 U/L — SIGNIFICANT CHANGE UP (ref 40–120)
ALT FLD-CCNC: 27 U/L — SIGNIFICANT CHANGE UP (ref 10–45)
ANION GAP SERPL CALC-SCNC: 2 MMOL/L — LOW (ref 5–17)
AST SERPL-CCNC: 28 U/L — SIGNIFICANT CHANGE UP (ref 10–40)
BILIRUB SERPL-MCNC: 0.6 MG/DL — SIGNIFICANT CHANGE UP (ref 0.2–1.2)
BUN SERPL-MCNC: 13 MG/DL — SIGNIFICANT CHANGE UP (ref 7–23)
CALCIUM SERPL-MCNC: 9.8 MG/DL — SIGNIFICANT CHANGE UP (ref 8.4–10.5)
CHLORIDE SERPL-SCNC: 112 MMOL/L — HIGH (ref 96–108)
CO2 SERPL-SCNC: 26 MMOL/L — SIGNIFICANT CHANGE UP (ref 22–31)
CREAT SERPL-MCNC: 0.7 MG/DL — SIGNIFICANT CHANGE UP (ref 0.5–1.3)
EGFR: 94 ML/MIN/1.73M2 — SIGNIFICANT CHANGE UP
GLUCOSE SERPL-MCNC: 133 MG/DL — HIGH (ref 70–99)
HCT VFR BLD CALC: 35 % — SIGNIFICANT CHANGE UP (ref 34.5–45)
HGB BLD-MCNC: 11.9 G/DL — SIGNIFICANT CHANGE UP (ref 11.5–15.5)
LYMPHOCYTES # BLD AUTO: 0.8 K/UL — LOW (ref 1–3.3)
LYMPHOCYTES # BLD AUTO: 23.9 % — SIGNIFICANT CHANGE UP (ref 13–44)
MCHC RBC-ENTMCNC: 28.3 PG — SIGNIFICANT CHANGE UP (ref 27–34)
MCHC RBC-ENTMCNC: 34 GM/DL — SIGNIFICANT CHANGE UP (ref 32–36)
MCV RBC AUTO: 83.3 FL — SIGNIFICANT CHANGE UP (ref 80–100)
NEUTROPHILS # BLD AUTO: 2.5 K/UL — SIGNIFICANT CHANGE UP (ref 1.8–7.4)
NEUTROPHILS NFR BLD AUTO: 72.4 % — SIGNIFICANT CHANGE UP (ref 43–77)
PLATELET # BLD AUTO: 210 K/UL — SIGNIFICANT CHANGE UP (ref 150–400)
POTASSIUM SERPL-MCNC: 4.3 MMOL/L — SIGNIFICANT CHANGE UP (ref 3.5–5.3)
POTASSIUM SERPL-SCNC: 4.3 MMOL/L — SIGNIFICANT CHANGE UP (ref 3.5–5.3)
PROT SERPL-MCNC: 7.3 G/DL — SIGNIFICANT CHANGE UP (ref 6–8.3)
RBC # BLD: 4.2 M/UL — SIGNIFICANT CHANGE UP (ref 3.8–5.2)
RBC # FLD: 12.3 % — SIGNIFICANT CHANGE UP (ref 10.3–14.5)
SODIUM SERPL-SCNC: 140 MMOL/L — SIGNIFICANT CHANGE UP (ref 135–145)
WBC # BLD: 3.4 K/UL — LOW (ref 3.8–10.5)
WBC # FLD AUTO: 3.4 K/UL — LOW (ref 3.8–10.5)

## 2023-07-12 PROCEDURE — 80053 COMPREHEN METABOLIC PANEL: CPT

## 2023-07-12 PROCEDURE — 96375 TX/PRO/DX INJ NEW DRUG ADDON: CPT

## 2023-07-12 PROCEDURE — 96413 CHEMO IV INFUSION 1 HR: CPT

## 2023-07-12 PROCEDURE — 36415 COLL VENOUS BLD VENIPUNCTURE: CPT

## 2023-07-12 PROCEDURE — 96401 CHEMO ANTI-NEOPL SQ/IM: CPT

## 2023-07-12 PROCEDURE — 96417 CHEMO IV INFUS EACH ADDL SEQ: CPT

## 2023-07-12 PROCEDURE — 85025 COMPLETE CBC W/AUTO DIFF WBC: CPT

## 2023-07-12 RX ORDER — DIPHENHYDRAMINE HCL 50 MG
25 CAPSULE ORAL ONCE
Refills: 0 | Status: COMPLETED | OUTPATIENT
Start: 2023-07-12 | End: 2023-07-12

## 2023-07-12 RX ORDER — DEXAMETHASONE 0.5 MG/5ML
4 ELIXIR ORAL ONCE
Refills: 0 | Status: COMPLETED | OUTPATIENT
Start: 2023-07-12 | End: 2023-07-12

## 2023-07-12 RX ORDER — PACLITAXEL 6 MG/ML
160 INJECTION, SOLUTION, CONCENTRATE INTRAVENOUS ONCE
Refills: 0 | Status: COMPLETED | OUTPATIENT
Start: 2023-07-12 | End: 2023-07-12

## 2023-07-12 RX ORDER — ACETAMINOPHEN 500 MG
650 TABLET ORAL ONCE
Refills: 0 | Status: COMPLETED | OUTPATIENT
Start: 2023-07-12 | End: 2023-07-12

## 2023-07-12 RX ORDER — PERTUZUMAB, TRASTUZUMAB, AND HYALURONIDASE-ZZXF 600; 600; 2000 MG/10ML; MG/10ML; U/10ML
10 INJECTION, SOLUTION SUBCUTANEOUS ONCE
Refills: 0 | Status: COMPLETED | OUTPATIENT
Start: 2023-07-12 | End: 2023-07-12

## 2023-07-12 RX ORDER — PALONOSETRON HYDROCHLORIDE 0.25 MG/5ML
0.25 INJECTION, SOLUTION INTRAVENOUS ONCE
Refills: 0 | Status: COMPLETED | OUTPATIENT
Start: 2023-07-12 | End: 2023-07-12

## 2023-07-12 RX ORDER — CARBOPLATIN 50 MG
207 VIAL (EA) INTRAVENOUS ONCE
Refills: 0 | Status: COMPLETED | OUTPATIENT
Start: 2023-07-12 | End: 2023-07-12

## 2023-07-12 RX ORDER — FAMOTIDINE 10 MG/ML
20 INJECTION INTRAVENOUS ONCE
Refills: 0 | Status: COMPLETED | OUTPATIENT
Start: 2023-07-12 | End: 2023-07-12

## 2023-07-12 RX ADMIN — Medication 207 MILLIGRAM(S): at 12:55

## 2023-07-12 RX ADMIN — Medication 202 MILLIGRAM(S): at 11:08

## 2023-07-12 RX ADMIN — Medication 207 MILLIGRAM(S): at 12:24

## 2023-07-12 RX ADMIN — PACLITAXEL 160 MILLIGRAM(S): 6 INJECTION, SOLUTION, CONCENTRATE INTRAVENOUS at 11:10

## 2023-07-12 RX ADMIN — FAMOTIDINE 20 MILLIGRAM(S): 10 INJECTION INTRAVENOUS at 10:50

## 2023-07-12 RX ADMIN — PALONOSETRON HYDROCHLORIDE 0.25 MILLIGRAM(S): 0.25 INJECTION, SOLUTION INTRAVENOUS at 10:36

## 2023-07-12 RX ADMIN — PERTUZUMAB, TRASTUZUMAB, AND HYALURONIDASE-ZZXF 10 MILLILITER(S): 600; 600; 2000 INJECTION, SOLUTION SUBCUTANEOUS at 10:06

## 2023-07-12 RX ADMIN — Medication 300 UNIT(S): at 13:10

## 2023-07-12 RX ADMIN — FAMOTIDINE 208 MILLIGRAM(S): 10 INJECTION INTRAVENOUS at 10:35

## 2023-07-12 RX ADMIN — Medication 4 MILLIGRAM(S): at 10:35

## 2023-07-12 RX ADMIN — Medication 204 MILLIGRAM(S): at 10:20

## 2023-07-12 RX ADMIN — Medication 650 MILLIGRAM(S): at 12:04

## 2023-07-12 RX ADMIN — Medication 650 MILLIGRAM(S): at 10:34

## 2023-07-12 RX ADMIN — Medication 25 MILLIGRAM(S): at 11:23

## 2023-07-12 RX ADMIN — PACLITAXEL 160 MILLIGRAM(S): 6 INJECTION, SOLUTION, CONCENTRATE INTRAVENOUS at 12:10

## 2023-07-13 LAB — DEPRECATED O AND P PREP STL: NORMAL

## 2023-07-17 LAB
C DIFF TOXIN B QL PCR REFLEX: NORMAL
GDH ANTIGEN: NOT DETECTED
TOXIN A AND B: NOT DETECTED

## 2023-07-19 ENCOUNTER — NON-APPOINTMENT (OUTPATIENT)
Age: 68
End: 2023-07-19

## 2023-07-19 ENCOUNTER — LABORATORY RESULT (OUTPATIENT)
Age: 68
End: 2023-07-19

## 2023-07-19 ENCOUNTER — APPOINTMENT (OUTPATIENT)
Dept: INFUSION THERAPY | Facility: CLINIC | Age: 68
End: 2023-07-19

## 2023-07-19 ENCOUNTER — OUTPATIENT (OUTPATIENT)
Dept: OUTPATIENT SERVICES | Facility: HOSPITAL | Age: 68
LOS: 1 days | End: 2023-07-19
Payer: MEDICARE

## 2023-07-19 ENCOUNTER — APPOINTMENT (OUTPATIENT)
Dept: HEMATOLOGY ONCOLOGY | Facility: CLINIC | Age: 68
End: 2023-07-19
Payer: MEDICARE

## 2023-07-19 VITALS
OXYGEN SATURATION: 96 % | SYSTOLIC BLOOD PRESSURE: 113 MMHG | RESPIRATION RATE: 16 BRPM | DIASTOLIC BLOOD PRESSURE: 74 MMHG | RESPIRATION RATE: 18 BRPM | TEMPERATURE: 97 F | WEIGHT: 199.08 LBS | HEIGHT: 66 IN | HEART RATE: 90 BPM | DIASTOLIC BLOOD PRESSURE: 74 MMHG | HEART RATE: 90 BPM | WEIGHT: 199 LBS | HEIGHT: 66 IN | SYSTOLIC BLOOD PRESSURE: 113 MMHG | BODY MASS INDEX: 31.98 KG/M2 | TEMPERATURE: 97.3 F | OXYGEN SATURATION: 96 %

## 2023-07-19 VITALS
DIASTOLIC BLOOD PRESSURE: 76 MMHG | SYSTOLIC BLOOD PRESSURE: 122 MMHG | HEART RATE: 82 BPM | OXYGEN SATURATION: 97 % | RESPIRATION RATE: 17 BRPM | TEMPERATURE: 98 F

## 2023-07-19 DIAGNOSIS — Z98.890 OTHER SPECIFIED POSTPROCEDURAL STATES: Chronic | ICD-10-CM

## 2023-07-19 DIAGNOSIS — C50.911 MALIGNANT NEOPLASM OF UNSPECIFIED SITE OF RIGHT FEMALE BREAST: ICD-10-CM

## 2023-07-19 DIAGNOSIS — Z90.49 ACQUIRED ABSENCE OF OTHER SPECIFIED PARTS OF DIGESTIVE TRACT: Chronic | ICD-10-CM

## 2023-07-19 DIAGNOSIS — Z90.89 ACQUIRED ABSENCE OF OTHER ORGANS: Chronic | ICD-10-CM

## 2023-07-19 PROCEDURE — 96413 CHEMO IV INFUSION 1 HR: CPT

## 2023-07-19 PROCEDURE — 36415 COLL VENOUS BLD VENIPUNCTURE: CPT

## 2023-07-19 PROCEDURE — 99214 OFFICE O/P EST MOD 30 MIN: CPT | Mod: 25

## 2023-07-19 PROCEDURE — 96417 CHEMO IV INFUS EACH ADDL SEQ: CPT

## 2023-07-19 PROCEDURE — 96375 TX/PRO/DX INJ NEW DRUG ADDON: CPT

## 2023-07-19 RX ORDER — PACLITAXEL 6 MG/ML
160 INJECTION, SOLUTION, CONCENTRATE INTRAVENOUS ONCE
Refills: 0 | Status: COMPLETED | OUTPATIENT
Start: 2023-07-19 | End: 2023-07-19

## 2023-07-19 RX ORDER — CARBOPLATIN 50 MG
207 VIAL (EA) INTRAVENOUS ONCE
Refills: 0 | Status: COMPLETED | OUTPATIENT
Start: 2023-07-19 | End: 2023-07-19

## 2023-07-19 RX ADMIN — Medication 202 MILLIGRAM(S): at 14:50

## 2023-07-19 RX ADMIN — Medication 207 MILLIGRAM(S): at 16:32

## 2023-07-19 RX ADMIN — Medication 650 MILLIGRAM(S): at 14:30

## 2023-07-19 RX ADMIN — FAMOTIDINE 208 MILLIGRAM(S): 10 INJECTION INTRAVENOUS at 14:26

## 2023-07-19 RX ADMIN — Medication 25 MILLIGRAM(S): at 15:30

## 2023-07-19 RX ADMIN — Medication 4 MILLIGRAM(S): at 14:25

## 2023-07-19 RX ADMIN — Medication 204 MILLIGRAM(S): at 14:10

## 2023-07-19 RX ADMIN — Medication 207 MILLIGRAM(S): at 17:05

## 2023-07-19 RX ADMIN — PALONOSETRON HYDROCHLORIDE 0.25 MILLIGRAM(S): 0.25 INJECTION, SOLUTION INTRAVENOUS at 14:45

## 2023-07-19 RX ADMIN — FAMOTIDINE 20 MILLIGRAM(S): 10 INJECTION INTRAVENOUS at 14:41

## 2023-07-19 RX ADMIN — Medication 650 MILLIGRAM(S): at 14:05

## 2023-07-19 RX ADMIN — Medication 300 UNIT(S): at 17:10

## 2023-07-19 RX ADMIN — PACLITAXEL 160 MILLIGRAM(S): 6 INJECTION, SOLUTION, CONCENTRATE INTRAVENOUS at 15:31

## 2023-07-19 RX ADMIN — PACLITAXEL 160 MILLIGRAM(S): 6 INJECTION, SOLUTION, CONCENTRATE INTRAVENOUS at 16:31

## 2023-07-19 NOTE — PHYSICAL EXAM
[Restricted in physically strenuous activity but ambulatory and able to carry out work of a light or sedentary nature] : Status 1- Restricted in physically strenuous activity but ambulatory and able to carry out work of a light or sedentary nature, e.g., light house work, office work [Normal] : affect appropriate [de-identified] : palpable 2-3cm hard mobile mass in right breast at about 6 oclock position 5cm from nipple - no longer palpable as of 7/5/23

## 2023-07-19 NOTE — ASSESSMENT
[FreeTextEntry1] : Patient is a 66yo woman with h/o CAD (seen by cardiology in past for ?tachycardia as per patient but w/o follow up) referred by Dr. Roy for a newly diagnosed multicentric right breast invasive ductal carcinoma ER/[place] negative, HER-2 positive. On MRI breast patient's disease spans 9cm with suspicious calcifications in between biopsied masses c/w IDC ER/WV- Her 2+. \par \par cT3 (area of concern measuring 8.7cm on MRI, possibly multicentric) cN0 Stage IIB\par \par Reviewed the biopsy results c/w ER/WV- Her2 positive breast cancer spanning a large part of patient's breast. I recommended neoadjuvant chemotherapy with dual Her 2 blockade with weekly Taxol/Carbo and Phesgo every 3 weeks over a 12 week period. I reviewed potential side effects including but not limited to allergic reactions, infusion reactions, fatigue, lowered immune system with lower blood counts, risk of neutropenic fever, needing growth factor support, nausea/vomiting, diarrhea, kidney and liver dysfunction, electrolyte abnormalities, peripheral neuropathy and reversible cardiomyopathy with dual her 2 agents. Patient expressed verbal understanding signing consent. \par \par Reviewed curative intent goal of care and needing an MRI breast after neoadj treatment with surgery following the repeat MRI breast to assess response. \par \par Plan prior to chemo:\par Echo - normal EF 5/2023.\par Chemo port by IR -placed at [facility] IR.\par \par 6/21/23 Visit - Starting weekly carbo/taxol and phesgo [id number].\par Compazine prn nausea. 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 I reviewed that treatment with Taxol is standard of care and asked her to keep a daily journal documenting her symptoms of numbness/tingling in her hands and feet. I reviewed with her that should she develop neuropathy we will have to re-evaluate risks/benefits of continuing treatment with weekly Taxol and may have to adjust therapy. \par Also discussed that this is dose dependent and usually occurs with later weeks of treatment. \par To monitor closely for any signs and symptoms of peripheral neuropathy. \par Patient had all her questions answered regarding treatment and signed consent form. \par Plan on checking Echos every 3 months. \par \par 7/5/23 Visit - with response to treatment on exam, w/o palpable lesion in right breast. \par To continue recommended treatment as planned with weekly carbo/taxol and phesgo every 21days. \par Compazine prn nausea. \par Stool negative for CDiff, and culture. \par To use immodium prn loose stools. reviewed instructions with patient, max dose per day 16mg. --reminded to take immodium with each loose stool. Prescribed Lomotil prn in addition to immodium. \par To monitor closely for any signs and symptoms of peripheral neuropathy. So far no signs/symptoms of neuropathy present.\par \par RTC in 1 week for treatment and 2 wks for follow up.

## 2023-07-19 NOTE — REVIEW OF SYSTEMS
[Fatigue] : fatigue [Diarrhea: Grade 4 - Life-threatening consequences; urgent intervention indicated] : Diarrhea: Grade 4 - Life-threatening consequences; urgent intervention indicated [Negative] : Allergic/Immunologic [FreeTextEntry9] : mild ongoing body aches d/t hip  [de-identified] : walks with a cane, needs R hip replacement, hx of L hip replacement

## 2023-07-19 NOTE — HISTORY OF PRESENT ILLNESS
[de-identified] : Patient here for f/u and week 5 of treatment. Reports fatigue and diarrhea, frequent and from day 4-6 patient experienced 6-8 loose stools a day with immodium only 1-2 per day. denies abd pain, denies blood in stool. Reports loss of appetite, but staying well hydrated. Denies fevers, SOB and CP and dizziness.\par \par  [FreeTextEntry1] : Patient is a 68yo woman with h/o CAD (seen by cardiology in past for ?tachycardia as per patient but w/o follow up) referred by Dr. Roy for a newly diagnosed multicentric right breast invasive ductal carcinoma ER/SD negative, HER-2 positive. Here today for f/u and tx. \par \par Patient presents to initial visit on 6/5/23 with her aunt. \par \par The patient's history began when she underwent bilateral screening mammogram on 4/13/2023.  Of note, the patient's last breast imaging was in 2021. She states that she was dealing with illness in her mother and subsequent death of her mother in 2022.  She was found to have scattered fibroglandular density.  There was a spiculated mass with calcifications at the 6 to 7 o'clock position 5 cm from the nipple.  There was a smaller mass in the more posterior 7 o'clock position 11 cm from the nipple.  There was an asymmetry in the right breast 6 cm from the nipple.  There was also pleomorphic calcifications in the mid to posterior right breast for which additional imaging was recommended.  The patient underwent right breast diagnostic mammogram and sonogram on 4/26/2023.  She was found to have the findings described above for which biopsy was recommended of all the sites.  The patient underwent right breast 6:00 biopsy of the 2.2 cm spiculated mass which returned as invasive ductal carcinoma, poorly differentiated, ER negative, SD negative, HER2 positive.  She also underwent right breast ultrasound-guided biopsy of the 8:00 0.9 cm mass.  Pathology returned as invasive ductal carcinoma, ER negative, SD negative, HER2 positive.  At the right breast 5 o'clock position stereotactic core biopsy was performed of the calcifications which returned as a microinvasive carcinoma with DCIS, high nuclear grade. Of note, there are highly suspicious calcifications identified between the calcifications sampled and the 8:00 posterior mass.  The findings are compatible with multicentric breast cancer involving at least 2 quadrants and spanning over 9 cm.  \par  MRI breast on 6/2/23 showed extensive disease in the right breast with the dominant tumor abutting the skin, and two sites of suspicion on the left breast recommended for MRI biopsies. Area of skin abutting the tumor will be marked by Dr Montgomery from radiation oncology as per Dr Roy prior to pt starting chemo.\par \par \par

## 2023-07-21 LAB
ALBUMIN SERPL ELPH-MCNC: 3.9 G/DL
ALP BLD-CCNC: 69 U/L
ALT SERPL-CCNC: 33 U/L
ANION GAP SERPL CALC-SCNC: 9 MMOL/L
AST SERPL-CCNC: 28 U/L
BILIRUB SERPL-MCNC: 0.6 MG/DL
BUN SERPL-MCNC: 13 MG/DL
CALCIUM SERPL-MCNC: 9.3 MG/DL
CHLORIDE SERPL-SCNC: 109 MMOL/L
CO2 SERPL-SCNC: 25 MMOL/L
CREAT SERPL-MCNC: 0.8 MG/DL
EGFR: 80 ML/MIN/1.73M2
GLUCOSE SERPL-MCNC: 109 MG/DL
POTASSIUM SERPL-SCNC: 4.3 MMOL/L
PROT SERPL-MCNC: 7.3 G/DL
SODIUM SERPL-SCNC: 143 MMOL/L

## 2023-07-27 ENCOUNTER — APPOINTMENT (OUTPATIENT)
Dept: INFUSION THERAPY | Facility: CLINIC | Age: 68
End: 2023-07-27

## 2023-07-27 ENCOUNTER — LABORATORY RESULT (OUTPATIENT)
Age: 68
End: 2023-07-27

## 2023-07-31 ENCOUNTER — APPOINTMENT (OUTPATIENT)
Dept: HEMATOLOGY ONCOLOGY | Facility: CLINIC | Age: 68
End: 2023-07-31
Payer: MEDICARE

## 2023-07-31 PROCEDURE — 90834 PSYTX W PT 45 MINUTES: CPT | Mod: 95

## 2023-08-02 ENCOUNTER — LABORATORY RESULT (OUTPATIENT)
Age: 68
End: 2023-08-02

## 2023-08-02 ENCOUNTER — APPOINTMENT (OUTPATIENT)
Dept: HEMATOLOGY ONCOLOGY | Facility: CLINIC | Age: 68
End: 2023-08-02
Payer: MEDICARE

## 2023-08-02 ENCOUNTER — APPOINTMENT (OUTPATIENT)
Dept: INFUSION THERAPY | Facility: CLINIC | Age: 68
End: 2023-08-02

## 2023-08-02 ENCOUNTER — NON-APPOINTMENT (OUTPATIENT)
Age: 68
End: 2023-08-02

## 2023-08-02 ENCOUNTER — OUTPATIENT (OUTPATIENT)
Dept: OUTPATIENT SERVICES | Facility: HOSPITAL | Age: 68
LOS: 1 days | End: 2023-08-02
Payer: MEDICARE

## 2023-08-02 VITALS
BODY MASS INDEX: 31.64 KG/M2 | TEMPERATURE: 98.3 F | OXYGEN SATURATION: 98 % | RESPIRATION RATE: 18 BRPM | DIASTOLIC BLOOD PRESSURE: 73 MMHG | HEART RATE: 90 BPM | SYSTOLIC BLOOD PRESSURE: 117 MMHG | WEIGHT: 196 LBS

## 2023-08-02 VITALS
RESPIRATION RATE: 18 BRPM | SYSTOLIC BLOOD PRESSURE: 122 MMHG | HEART RATE: 78 BPM | OXYGEN SATURATION: 99 % | TEMPERATURE: 98 F | DIASTOLIC BLOOD PRESSURE: 74 MMHG

## 2023-08-02 VITALS
TEMPERATURE: 98 F | RESPIRATION RATE: 18 BRPM | OXYGEN SATURATION: 99 % | HEART RATE: 78 BPM | SYSTOLIC BLOOD PRESSURE: 120 MMHG | DIASTOLIC BLOOD PRESSURE: 74 MMHG

## 2023-08-02 DIAGNOSIS — C50.911 MALIGNANT NEOPLASM OF UNSPECIFIED SITE OF RIGHT FEMALE BREAST: ICD-10-CM

## 2023-08-02 DIAGNOSIS — Z90.49 ACQUIRED ABSENCE OF OTHER SPECIFIED PARTS OF DIGESTIVE TRACT: Chronic | ICD-10-CM

## 2023-08-02 DIAGNOSIS — Z98.890 OTHER SPECIFIED POSTPROCEDURAL STATES: Chronic | ICD-10-CM

## 2023-08-02 DIAGNOSIS — Z90.89 ACQUIRED ABSENCE OF OTHER ORGANS: Chronic | ICD-10-CM

## 2023-08-02 PROCEDURE — 36415 COLL VENOUS BLD VENIPUNCTURE: CPT

## 2023-08-02 PROCEDURE — 96360 HYDRATION IV INFUSION INIT: CPT

## 2023-08-02 PROCEDURE — 99214 OFFICE O/P EST MOD 30 MIN: CPT | Mod: 25

## 2023-08-02 RX ORDER — SODIUM CHLORIDE 9 MG/ML
1000 INJECTION INTRAMUSCULAR; INTRAVENOUS; SUBCUTANEOUS ONCE
Refills: 0 | Status: COMPLETED | OUTPATIENT
Start: 2023-08-02 | End: 2023-08-02

## 2023-08-02 RX ADMIN — SODIUM CHLORIDE 500 MILLILITER(S): 9 INJECTION INTRAMUSCULAR; INTRAVENOUS; SUBCUTANEOUS at 12:36

## 2023-08-02 RX ADMIN — SODIUM CHLORIDE 1000 MILLILITER(S): 9 INJECTION INTRAMUSCULAR; INTRAVENOUS; SUBCUTANEOUS at 13:36

## 2023-08-03 ENCOUNTER — APPOINTMENT (OUTPATIENT)
Dept: INFUSION THERAPY | Facility: CLINIC | Age: 68
End: 2023-08-03

## 2023-08-05 LAB
ALBUMIN SERPL ELPH-MCNC: 3.6 G/DL
ALBUMIN SERPL ELPH-MCNC: 4.1 G/DL
ALP BLD-CCNC: 68 U/L
ALP BLD-CCNC: 76 U/L
ALT SERPL-CCNC: 22 U/L
ALT SERPL-CCNC: 27 U/L
ANION GAP SERPL CALC-SCNC: 11 MMOL/L
ANION GAP SERPL CALC-SCNC: 8 MMOL/L
AST SERPL-CCNC: 25 U/L
AST SERPL-CCNC: 28 U/L
BILIRUB SERPL-MCNC: 0.6 MG/DL
BILIRUB SERPL-MCNC: 0.7 MG/DL
BUN SERPL-MCNC: 12 MG/DL
BUN SERPL-MCNC: 16 MG/DL
CALCIUM SERPL-MCNC: 9 MG/DL
CALCIUM SERPL-MCNC: 9.8 MG/DL
CHLORIDE SERPL-SCNC: 106 MMOL/L
CHLORIDE SERPL-SCNC: 111 MMOL/L
CO2 SERPL-SCNC: 25 MMOL/L
CO2 SERPL-SCNC: 26 MMOL/L
CREAT SERPL-MCNC: 0.9 MG/DL
CREAT SERPL-MCNC: 1 MG/DL
EGFR: 61 ML/MIN/1.73M2
EGFR: 69 ML/MIN/1.73M2
GLUCOSE SERPL-MCNC: 100 MG/DL
GLUCOSE SERPL-MCNC: 125 MG/DL
POTASSIUM SERPL-SCNC: 3.9 MMOL/L
POTASSIUM SERPL-SCNC: 3.9 MMOL/L
PROT SERPL-MCNC: 6.5 G/DL
PROT SERPL-MCNC: 7.2 G/DL
SODIUM SERPL-SCNC: 143 MMOL/L
SODIUM SERPL-SCNC: 144 MMOL/L
TSH SERPL-ACNC: 0.07 UIU/ML

## 2023-08-05 NOTE — PHYSICAL EXAM
[Restricted in physically strenuous activity but ambulatory and able to carry out work of a light or sedentary nature] : Status 1- Restricted in physically strenuous activity but ambulatory and able to carry out work of a light or sedentary nature, e.g., light house work, office work [Normal] : affect appropriate [de-identified] : palpable 2-3cm hard mobile mass in right breast at about 6 oclock position 5cm from nipple - no longer palpable as of 7/5/23

## 2023-08-05 NOTE — ASSESSMENT
[FreeTextEntry1] : Patient is a 68yo woman with h/o CAD (seen by cardiology in past for ?tachycardia as per patient but w/o follow up) referred by Dr. Roy for a newly diagnosed multicentric right breast invasive ductal carcinoma ER/MI negative, HER-2 positive. On MRI breast patient's disease spans 9cm with suspicious calcifications in between biopsied masses c/w IDC ER/MI- Her 2+.   cT3 (area of concern measuring 8.7cm on MRI, possibly multicentric) cN0 Stage IIB  Reviewed the biopsy results c/w ER/MI- Her2 positive breast cancer spanning a large part of patient's breast. I recommended neoadjuvant chemotherapy with dual Her 2 blockade with weekly Taxol/Carbo and Phesgo every 3 weeks over a 12 week period. I reviewed potential side effects including but not limited to allergic reactions, infusion reactions, fatigue, lowered immune system with lower blood counts, risk of neutropenic fever, needing growth factor support, nausea/vomiting, diarrhea, kidney and liver dysfunction, electrolyte abnormalities, peripheral neuropathy and reversible cardiomyopathy with dual her 2 agents. Patient expressed verbal understanding signing consent.   Reviewed curative intent goal of care and needing an MRI breast after neoadj treatment with surgery following the repeat MRI breast to assess response.   Plan prior to chemo: Echo - normal EF 5/2023. Chemo port by IR -placed at [facility] IR.  6/21/23 Visit - Starting weekly carbo/taxol and phesgo [id number]. Compazine prn nausea.  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 I reviewed that treatment with Taxol is standard of care and asked her to keep a daily journal documenting her symptoms of numbness/tingling in her hands and feet. I reviewed with her that should she develop neuropathy we will have to re-evaluate risks/benefits of continuing treatment with weekly Taxol and may have to adjust therapy.  Also discussed that this is dose dependent and usually occurs with later weeks of treatment.  To monitor closely for any signs and symptoms of peripheral neuropathy.  Patient had all her questions answered regarding treatment and signed consent form.  Plan on checking Echos every 3 months.   7/5/23 Visit - with response to treatment on exam, w/o palpable lesion in right breast.  To continue recommended treatment as planned with weekly carbo/taxol and phesgo every 21days.  Compazine prn nausea.  Stool negative for CDiff, and culture.  To use immodium prn loose stools. reviewed instructions with patient, max dose per day 16mg. --reminded to take immodium with each loose stool. Prescribed Lomotil prn in addition to immodium.  To monitor closely for any signs and symptoms of peripheral neuropathy. So far no signs/symptoms of neuropathy present.  7/26/23 - treatment held due to low platelet count of 57, to d/c carbo. 8/2/23 - treatment held due to poor oral PO intake due to  oral mucositis/diarrhea, dehydration, also .  To give IVF 1L NS today, hold treatment.  Magic mouth wash TID before meals given mucositis.  To drop carbo from treatment given cytopenias.   RTC in 1 week for treatment and follow up.

## 2023-08-05 NOTE — REVIEW OF SYSTEMS
[Fatigue] : fatigue [Diarrhea: Grade 4 - Life-threatening consequences; urgent intervention indicated] : Diarrhea: Grade 4 - Life-threatening consequences; urgent intervention indicated [Negative] : Allergic/Immunologic [FreeTextEntry9] : mild ongoing body aches d/t hip  [de-identified] : walks with a cane, needs R hip replacement, hx of L hip replacement

## 2023-08-05 NOTE — HISTORY OF PRESENT ILLNESS
[de-identified] : Patient here for f/u and week 7 of treatment. Last week treatment held due to low platelets of 57.  Today pt reports decreased PO intake, occasional dizziness/lightheadedness. Fewer loose stools, <3/day.  Denies fevers, SOB and CP and dizziness. Today CBC with platelets of 108, but ANC of 900.    [FreeTextEntry1] : Patient is a 66yo woman with h/o CAD (seen by cardiology in past for ?tachycardia as per patient but w/o follow up) referred by Dr. Roy for a newly diagnosed multicentric right breast invasive ductal carcinoma ER/FL negative, HER-2 positive. Here today for f/u and tx. \par  \par  Patient presents to initial visit on 6/5/23 with her aunt. \par  \par  The patient's history began when she underwent bilateral screening mammogram on 4/13/2023.  Of note, the patient's last breast imaging was in 2021. She states that she was dealing with illness in her mother and subsequent death of her mother in 2022.  She was found to have scattered fibroglandular density.  There was a spiculated mass with calcifications at the 6 to 7 o'clock position 5 cm from the nipple.  There was a smaller mass in the more posterior 7 o'clock position 11 cm from the nipple.  There was an asymmetry in the right breast 6 cm from the nipple.  There was also pleomorphic calcifications in the mid to posterior right breast for which additional imaging was recommended.  The patient underwent right breast diagnostic mammogram and sonogram on 4/26/2023.  She was found to have the findings described above for which biopsy was recommended of all the sites.  The patient underwent right breast 6:00 biopsy of the 2.2 cm spiculated mass which returned as invasive ductal carcinoma, poorly differentiated, ER negative, FL negative, HER2 positive.  She also underwent right breast ultrasound-guided biopsy of the 8:00 0.9 cm mass.  Pathology returned as invasive ductal carcinoma, ER negative, FL negative, HER2 positive.  At the right breast 5 o'clock position stereotactic core biopsy was performed of the calcifications which returned as a microinvasive carcinoma with DCIS, high nuclear grade. Of note, there are highly suspicious calcifications identified between the calcifications sampled and the 8:00 posterior mass.  The findings are compatible with multicentric breast cancer involving at least 2 quadrants and spanning over 9 cm.  \par   MRI breast on 6/2/23 showed extensive disease in the right breast with the dominant tumor abutting the skin, and two sites of suspicion on the left breast recommended for MRI biopsies. Area of skin abutting the tumor will be marked by Dr Montgomery from radiation oncology as per Dr Roy prior to pt starting chemo.\par  \par  \par

## 2023-08-07 ENCOUNTER — NON-APPOINTMENT (OUTPATIENT)
Age: 68
End: 2023-08-07

## 2023-08-09 ENCOUNTER — APPOINTMENT (OUTPATIENT)
Dept: HEMATOLOGY ONCOLOGY | Facility: CLINIC | Age: 68
End: 2023-08-09
Payer: MEDICARE

## 2023-08-09 ENCOUNTER — LABORATORY RESULT (OUTPATIENT)
Age: 68
End: 2023-08-09

## 2023-08-09 VITALS
TEMPERATURE: 98.2 F | SYSTOLIC BLOOD PRESSURE: 134 MMHG | WEIGHT: 196 LBS | HEART RATE: 98 BPM | RESPIRATION RATE: 18 BRPM | BODY MASS INDEX: 31.5 KG/M2 | HEIGHT: 66 IN | OXYGEN SATURATION: 98 % | DIASTOLIC BLOOD PRESSURE: 82 MMHG

## 2023-08-09 PROCEDURE — 36415 COLL VENOUS BLD VENIPUNCTURE: CPT

## 2023-08-09 PROCEDURE — 99214 OFFICE O/P EST MOD 30 MIN: CPT | Mod: 25

## 2023-08-09 NOTE — PHYSICAL EXAM
[Restricted in physically strenuous activity but ambulatory and able to carry out work of a light or sedentary nature] : Status 1- Restricted in physically strenuous activity but ambulatory and able to carry out work of a light or sedentary nature, e.g., light house work, office work [Normal] : affect appropriate [de-identified] : palpable 2-3cm hard mobile mass in right breast at about 6 oclock position 5cm from nipple - no longer palpable as of 7/5/23

## 2023-08-09 NOTE — ASSESSMENT
[FreeTextEntry1] : Patient is a 68yo woman with h/o CAD (seen by cardiology in past for ?tachycardia as per patient but w/o follow up) referred by Dr. 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 On MRI breast patient's disease spans 9cm with suspicious calcifications in between biopsied masses c/w IDC ER/UT- Her 2+.   cT3 (area of concern measuring 8.7cm on MRI, possibly multicentric) cN0 Stage IIB  Reviewed the biopsy results c/w ER/UT- Her2 positive breast cancer spanning a large part of patient's breast. I recommended neoadjuvant chemotherapy with dual Her 2 blockade with weekly Taxol/Carbo and Phesgo every 3 weeks over a 12 week period. I reviewed potential side effects including but not limited to allergic reactions, infusion reactions, fatigue, lowered immune system with lower blood counts, risk of neutropenic fever, needing growth factor support, nausea/vomiting, diarrhea, kidney and liver dysfunction, electrolyte abnormalities, peripheral neuropathy and reversible cardiomyopathy with dual her 2 agents. Patient expressed verbal understanding signing consent.   Reviewed curative intent goal of care and needing an MRI breast after neoadj treatment with surgery following the repeat MRI breast to assess response.   Plan prior to chemo: Echo - normal EF 5/2023. Chemo port by IR -placed at [facility] IR.  6/21/23 Visit - Starting weekly carbo/taxol and phesgo [id number]. Compazine prn nausea.  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 I reviewed that treatment with Taxol is standard of care and asked her to keep a daily journal documenting her symptoms of numbness/tingling in her hands and feet. I reviewed with her that should she develop neuropathy we will have to re-evaluate risks/benefits of continuing treatment with weekly Taxol and may have to adjust therapy.  Also discussed that this is dose dependent and usually occurs with later weeks of treatment.  To monitor closely for any signs and symptoms of peripheral neuropathy.  Patient had all her questions answered regarding treatment and signed consent form.  Plan on checking Echos every 3 months.   7/5/23 Visit - with response to treatment on exam, w/o palpable lesion in right breast.  To continue recommended treatment as planned with weekly carbo/taxol and phesgo every 21days.  Compazine prn nausea.  Stool negative for CDiff, and culture.  To use immodium prn loose stools. reviewed instructions with patient, max dose per day 16mg. --reminded to take immodium with each loose stool. Prescribed Lomotil prn in addition to immodium.  To monitor closely for any signs and symptoms of peripheral neuropathy. So far no signs/symptoms of neuropathy present.  7/26/23 - treatment held due to low platelet count of 57, to d/c carbo. 8/2/23 - treatment held due to poor oral PO intake due to  oral mucositis/diarrhea, dehydration, also .  To give IVF 1L NS today, hold treatment.  Magic mouth wash TID before meals given mucositis.   8/9/23 To hold therapy today given ANC <1, reassured that treatment will be resumed next week w/o carbo. To drop carbo from treatment given cytopenias.   RTC in 1 week for treatment and follow up.

## 2023-08-09 NOTE — REVIEW OF SYSTEMS
Patient is scheduled with Yanely Code on 10/1/2019 in the [facility] location  [Fatigue] : fatigue [Diarrhea: Grade 4 - Life-threatening consequences; urgent intervention indicated] : Diarrhea: Grade 4 - Life-threatening consequences; urgent intervention indicated [Negative] : Allergic/Immunologic [FreeTextEntry9] : mild ongoing body aches d/t hip  [de-identified] : walks with a cane, needs R hip replacement, hx of L hip replacement

## 2023-08-09 NOTE — HISTORY OF PRESENT ILLNESS
[de-identified] : Patient here for f/u and treatment, but ANC <1.  Denies fevers, SOB and CP and dizziness.    [FreeTextEntry1] : Patient is a 66yo woman with h/o CAD (seen by cardiology in past for ?tachycardia as per patient but w/o follow up) referred by Dr. Roy for a newly diagnosed multicentric right breast invasive ductal carcinoma ER/DE negative, HER-2 positive. Here today for f/u and tx.   Patient presents to initial visit on 6/5/23 with her aunt.   The patient's history began when she underwent bilateral screening mammogram on 4/13/2023.  Of note, the patient's last breast imaging was in 2021. She states that she was dealing with illness in her mother and subsequent death of her mother in 2022.  She was found to have scattered fibroglandular density.  There was a spiculated mass with calcifications at the 6 to 7 o'clock position 5 cm from the nipple.  There was a smaller mass in the more posterior 7 o'clock position 11 cm from the nipple.  There was an asymmetry in the right breast 6 cm from the nipple.  There was also pleomorphic calcifications in the mid to posterior right breast for which additional imaging was recommended.  The patient underwent right breast diagnostic mammogram and sonogram on 4/26/2023.  She was found to have the findings described above for which biopsy was recommended of all the sites.  The patient underwent right breast 6:00 biopsy of the 2.2 cm spiculated mass which returned as invasive ductal carcinoma, poorly differentiated, ER negative, DE negative, HER2 positive.  She also underwent right breast ultrasound-guided biopsy of the 8:00 0.9 cm mass.  Pathology returned as invasive ductal carcinoma, ER negative, DE negative, HER2 positive.  At the right breast 5 o'clock position stereotactic core biopsy was performed of the calcifications which returned as a microinvasive carcinoma with DCIS, high nuclear grade. Of note, there are highly suspicious calcifications identified between the calcifications sampled and the 8:00 posterior mass.  The findings are compatible with multicentric breast cancer involving at least 2 quadrants and spanning over 9 cm.    MRI breast on 6/2/23 showed extensive disease in the right breast with the dominant tumor abutting the skin, and two sites of suspicion on the left breast recommended for MRI biopsies. Area of skin abutting the tumor will be marked by Dr Montgomery from radiation oncology as per Dr Roy prior to pt starting chemo.

## 2023-08-11 ENCOUNTER — APPOINTMENT (OUTPATIENT)
Dept: HEMATOLOGY ONCOLOGY | Facility: CLINIC | Age: 68
End: 2023-08-11

## 2023-08-11 NOTE — DISCUSSION/SUMMARY
[FreeTextEntry1] : REASON FOR CONSULT Tanvi Gomez is a 67-year-old female referred by Dr. Xochilt Roy for cancer genetic counseling and risk assessment due to a personal history of bilateral breast cancer. Ms. Gomez was seen on 2023 at which time medical and family history was ascertained and a pedigree constructed. She was accompanied by her aunt, Agusto.   RELEVANT MEDICAL HISTORY Ms. Gomez was diagnosed with a right breast cancer in  at the age of 67. Pathology report revealed multicentric invasive ductal carcinoma (ER-/[place]-/HER2+). She then underwent breast MRI which detected a left breast mass which was biopsied and later revealed ductal carcinoma in situ (ER+/[place]+). She is currently receiving neoadjuvant chemotherapy.   OTHER MEDICAL AND SURGICAL HISTORY: -	Medical History: CAD -	Surgical History: cholecystectomy, tonsillectomy, hemilaminectomy w/disc removal, bunionectomy, left hip replacement  OB/GYN HISTORY: Obstetrical History:  Age at Menarche: 8 Menopausal Status: Post-menopausal with LMP at age 48 Age at First Live Birth: 27 Oral Contraceptive Use: Yes, 5 years Hormone Replacement Therapy: No  CANCER SCREENING HISTORY:   Breast:  -	Mammography: 23- rec right biopsy -	Sonography: 23- rec right biopsy -	MRI: 23- rec left breast biopsy GYN: -	Pelvic Examination: Annual- reportedly wnl Colon: -	Colonoscopy: 5 years ago- reportedly 4 polyps identified (pathology unknown), repeat in 5 years Skin:   -	FBSE: No -	Lesions biopsied/removed: No  SOCIAL HISTORY: -	Tobacco-product use: No -	Environmental exposures: No   FAMILY HISTORY: Maternal ancestry was reported as [place]ic/Sinhala and paternal ancestry was reported as [place]ic. Ashkenazi Confucianism ancestry was denied. A detailed family history of cancer was ascertained, see below and scanned chart for pedigree.   According to Ms. Gomez no one in the family has had germline testing for cancer susceptibility. Consanguinity was denied.  	 RISK ASSESSMENT: Ms. Gomez's personal and family history is suggestive of a hereditary cancer syndrome given her new diagnosis of bilateral breast cancer, father's history of prostate cancer, and brother's history of prostate cancer. The patient meets National Comprehensive Cancer Network (NCCN) criteria for genetic testing. We recommended genetic testing for genes associated with breast and gynecological cancer. This test analyzes 19 genes: BRITNEY, BARD1, BRCA1, BRCA2, BRIP1, CDH1, CHEK2, EPCAM, MLH1, MSH2, MSH6, NF1, PALB2, PMS2, PTEN, RAD51C, RAD51D, STK11, and TP53.  The risks, benefits and limitations of genetic testing were discussed with Ms. Gomez. In addition, we discussed the purpose of genetic testing and possible test results (positive, negative, inconclusive) along with associated medical management options and psychosocial implications. Insurance coverage and potential out of pocket costs were also discussed.   It was explained that risk assessment is based upon medical and family history as provided and may change in the future should new information be obtained.   Following our discussion, Ms. Gomez consented to the above-mentioned genetic testing panel. Blood was drawn in our laboratory and sent to [facility] today.  PLAN:  1.	Blood drawn today will be sent to [facility] for analysis.  2.	We will contact Ms. Gomez to schedule a follow-up appointment once the results are available. Results generally return in 2-3 weeks.   For any additional questions please call Cancer Genetics at [phone].    Irma Romero MS, [facility] Genetic Counselor, Cancer Genetics

## 2023-08-12 LAB
ALBUMIN SERPL ELPH-MCNC: 3.7 G/DL
ALP BLD-CCNC: 81 U/L
ALT SERPL-CCNC: 21 U/L
ANION GAP SERPL CALC-SCNC: 1 MMOL/L
AST SERPL-CCNC: 26 U/L
BILIRUB SERPL-MCNC: 0.6 MG/DL
BUN SERPL-MCNC: 13 MG/DL
CALCIUM SERPL-MCNC: 9.7 MG/DL
CHLORIDE SERPL-SCNC: 112 MMOL/L
CO2 SERPL-SCNC: 29 MMOL/L
CREAT SERPL-MCNC: 0.9 MG/DL
EGFR: 69 ML/MIN/1.73M2
GLUCOSE SERPL-MCNC: 129 MG/DL
POTASSIUM SERPL-SCNC: 4.3 MMOL/L
PROT SERPL-MCNC: 6.8 G/DL
SODIUM SERPL-SCNC: 142 MMOL/L

## 2023-08-14 ENCOUNTER — APPOINTMENT (OUTPATIENT)
Dept: HEMATOLOGY ONCOLOGY | Facility: CLINIC | Age: 68
End: 2023-08-14
Payer: MEDICARE

## 2023-08-14 PROCEDURE — 90834 PSYTX W PT 45 MINUTES: CPT | Mod: 95

## 2023-08-16 ENCOUNTER — LABORATORY RESULT (OUTPATIENT)
Age: 68
End: 2023-08-16

## 2023-08-16 ENCOUNTER — APPOINTMENT (OUTPATIENT)
Dept: INFUSION THERAPY | Facility: CLINIC | Age: 68
End: 2023-08-16

## 2023-08-16 ENCOUNTER — OUTPATIENT (OUTPATIENT)
Dept: OUTPATIENT SERVICES | Facility: HOSPITAL | Age: 68
LOS: 1 days | End: 2023-08-16
Payer: MEDICARE

## 2023-08-16 ENCOUNTER — APPOINTMENT (OUTPATIENT)
Dept: HEMATOLOGY ONCOLOGY | Facility: CLINIC | Age: 68
End: 2023-08-16
Payer: MEDICARE

## 2023-08-16 VITALS
SYSTOLIC BLOOD PRESSURE: 124 MMHG | WEIGHT: 195.11 LBS | RESPIRATION RATE: 16 BRPM | HEIGHT: 66 IN | OXYGEN SATURATION: 98 % | HEART RATE: 82 BPM | TEMPERATURE: 98 F | DIASTOLIC BLOOD PRESSURE: 78 MMHG

## 2023-08-16 VITALS
DIASTOLIC BLOOD PRESSURE: 81 MMHG | RESPIRATION RATE: 18 BRPM | BODY MASS INDEX: 31.34 KG/M2 | HEIGHT: 66 IN | HEART RATE: 86 BPM | WEIGHT: 195 LBS | TEMPERATURE: 97.1 F | OXYGEN SATURATION: 97 % | SYSTOLIC BLOOD PRESSURE: 126 MMHG

## 2023-08-16 VITALS
OXYGEN SATURATION: 99 % | TEMPERATURE: 98 F | SYSTOLIC BLOOD PRESSURE: 134 MMHG | RESPIRATION RATE: 17 BRPM | HEART RATE: 80 BPM | DIASTOLIC BLOOD PRESSURE: 77 MMHG

## 2023-08-16 DIAGNOSIS — Z90.89 ACQUIRED ABSENCE OF OTHER ORGANS: Chronic | ICD-10-CM

## 2023-08-16 DIAGNOSIS — Z98.890 OTHER SPECIFIED POSTPROCEDURAL STATES: Chronic | ICD-10-CM

## 2023-08-16 DIAGNOSIS — Z90.49 ACQUIRED ABSENCE OF OTHER SPECIFIED PARTS OF DIGESTIVE TRACT: Chronic | ICD-10-CM

## 2023-08-16 DIAGNOSIS — C50.912 MALIGNANT NEOPLASM OF UNSPECIFIED SITE OF LEFT FEMALE BREAST: ICD-10-CM

## 2023-08-16 LAB
ALBUMIN SERPL ELPH-MCNC: 4.1 G/DL
ALP BLD-CCNC: 80 U/L
ALT SERPL-CCNC: 17 U/L
ANION GAP SERPL CALC-SCNC: 5 MMOL/L
AST SERPL-CCNC: 23 U/L
BILIRUB SERPL-MCNC: 0.6 MG/DL
BUN SERPL-MCNC: 14 MG/DL
CALCIUM SERPL-MCNC: 9.4 MG/DL
CHLORIDE SERPL-SCNC: 111 MMOL/L
CO2 SERPL-SCNC: 27 MMOL/L
CREAT SERPL-MCNC: 0.9 MG/DL
EGFR: 69 ML/MIN/1.73M2
GLUCOSE SERPL-MCNC: 105 MG/DL
POTASSIUM SERPL-SCNC: 4.5 MMOL/L
PROT SERPL-MCNC: 7.3 G/DL
SODIUM SERPL-SCNC: 143 MMOL/L

## 2023-08-16 PROCEDURE — 96413 CHEMO IV INFUSION 1 HR: CPT

## 2023-08-16 PROCEDURE — 36415 COLL VENOUS BLD VENIPUNCTURE: CPT

## 2023-08-16 PROCEDURE — 96375 TX/PRO/DX INJ NEW DRUG ADDON: CPT

## 2023-08-16 PROCEDURE — 99214 OFFICE O/P EST MOD 30 MIN: CPT | Mod: 25

## 2023-08-16 PROCEDURE — 96401 CHEMO ANTI-NEOPL SQ/IM: CPT

## 2023-08-16 RX ORDER — PACLITAXEL 6 MG/ML
160 INJECTION, SOLUTION, CONCENTRATE INTRAVENOUS ONCE
Refills: 0 | Status: COMPLETED | OUTPATIENT
Start: 2023-08-16 | End: 2023-08-16

## 2023-08-16 RX ORDER — ACETAMINOPHEN 500 MG
650 TABLET ORAL ONCE
Refills: 0 | Status: COMPLETED | OUTPATIENT
Start: 2023-08-16 | End: 2023-08-16

## 2023-08-16 RX ORDER — ONDANSETRON 8 MG/1
8 TABLET, FILM COATED ORAL ONCE
Refills: 0 | Status: COMPLETED | OUTPATIENT
Start: 2023-08-16 | End: 2023-08-16

## 2023-08-16 RX ORDER — PERTUZUMAB, TRASTUZUMAB, AND HYALURONIDASE-ZZXF 600; 600; 2000 MG/10ML; MG/10ML; U/10ML
10 INJECTION, SOLUTION SUBCUTANEOUS ONCE
Refills: 0 | Status: COMPLETED | OUTPATIENT
Start: 2023-08-16 | End: 2023-08-16

## 2023-08-16 RX ORDER — FAMOTIDINE 10 MG/ML
20 INJECTION INTRAVENOUS ONCE
Refills: 0 | Status: COMPLETED | OUTPATIENT
Start: 2023-08-16 | End: 2023-08-16

## 2023-08-16 RX ORDER — DEXAMETHASONE 0.5 MG/5ML
4 ELIXIR ORAL ONCE
Refills: 0 | Status: COMPLETED | OUTPATIENT
Start: 2023-08-16 | End: 2023-08-16

## 2023-08-16 RX ORDER — DIPHENHYDRAMINE HCL 50 MG
25 CAPSULE ORAL ONCE
Refills: 0 | Status: COMPLETED | OUTPATIENT
Start: 2023-08-16 | End: 2023-08-16

## 2023-08-16 RX ADMIN — PACLITAXEL 160 MILLIGRAM(S): 6 INJECTION, SOLUTION, CONCENTRATE INTRAVENOUS at 12:35

## 2023-08-16 RX ADMIN — FAMOTIDINE 208 MILLIGRAM(S): 10 INJECTION INTRAVENOUS at 11:31

## 2023-08-16 RX ADMIN — Medication 202 MILLIGRAM(S): at 12:01

## 2023-08-16 RX ADMIN — FAMOTIDINE 20 MILLIGRAM(S): 10 INJECTION INTRAVENOUS at 11:45

## 2023-08-16 RX ADMIN — Medication 25 MILLIGRAM(S): at 12:32

## 2023-08-16 RX ADMIN — Medication 300 UNIT(S): at 13:40

## 2023-08-16 RX ADMIN — ONDANSETRON 8 MILLIGRAM(S): 8 TABLET, FILM COATED ORAL at 12:00

## 2023-08-16 RX ADMIN — Medication 204 MILLIGRAM(S): at 11:16

## 2023-08-16 RX ADMIN — PERTUZUMAB, TRASTUZUMAB, AND HYALURONIDASE-ZZXF 10 MILLILITER(S): 600; 600; 2000 INJECTION, SOLUTION SUBCUTANEOUS at 11:19

## 2023-08-16 RX ADMIN — ONDANSETRON 216 MILLIGRAM(S): 8 TABLET, FILM COATED ORAL at 11:46

## 2023-08-16 RX ADMIN — PACLITAXEL 160 MILLIGRAM(S): 6 INJECTION, SOLUTION, CONCENTRATE INTRAVENOUS at 13:35

## 2023-08-16 RX ADMIN — Medication 650 MILLIGRAM(S): at 11:24

## 2023-08-16 RX ADMIN — Medication 4 MILLIGRAM(S): at 11:30

## 2023-08-16 NOTE — PHYSICAL EXAM
[Restricted in physically strenuous activity but ambulatory and able to carry out work of a light or sedentary nature] : Status 1- Restricted in physically strenuous activity but ambulatory and able to carry out work of a light or sedentary nature, e.g., light house work, office work [Normal] : affect appropriate [de-identified] : palpable 2-3cm hard mobile mass in right breast at about 6 oclock position 5cm from nipple - no longer palpable as of 7/5/23, no longer palpable lesion exam 8/16

## 2023-08-16 NOTE — ASSESSMENT
[FreeTextEntry1] : Patient is a 68yo woman with h/o CAD (seen by cardiology in past for ?tachycardia as per patient but w/o follow up) referred by Dr. Roy for a newly diagnosed multicentric right breast invasive ductal carcinoma ER/CT negative, HER-2 positive. On MRI breast patient's disease spans 9cm with suspicious calcifications in between biopsied masses c/w IDC ER/CT- Her 2+.   cT3 (area of concern measuring 8.7cm on MRI, possibly multicentric) cN0 Stage IIB  Reviewed the biopsy results c/w ER/CT- Her2 positive breast cancer spanning a large part of patient's breast. I recommended neoadjuvant chemotherapy with dual Her 2 blockade with weekly Taxol/Carbo and Phesgo every 3 weeks over a 12 week period. I reviewed potential side effects including but not limited to allergic reactions, infusion reactions, fatigue, lowered immune system with lower blood counts, risk of neutropenic fever, needing growth factor support, nausea/vomiting, diarrhea, kidney and liver dysfunction, electrolyte abnormalities, peripheral neuropathy and reversible cardiomyopathy with dual her 2 agents. Patient expressed verbal understanding signing consent.   Reviewed curative intent goal of care and needing an MRI breast after neoadj treatment with surgery following the repeat MRI breast to assess response.   Plan prior to chemo: Echo - normal EF 5/2023. Chemo port by IR -placed at [facility] IR.  6/21/23 Visit - Starting weekly carbo/taxol and phesgo [id number]. Compazine prn nausea.  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 I reviewed that treatment with Taxol is standard of care and asked her to keep a daily journal documenting her symptoms of numbness/tingling in her hands and feet. I reviewed with her that should she develop neuropathy we will have to re-evaluate risks/benefits of continuing treatment with weekly Taxol and may have to adjust therapy.  Also discussed that this is dose dependent and usually occurs with later weeks of treatment.  To monitor closely for any signs and symptoms of peripheral neuropathy.  Patient had all her questions answered regarding treatment and signed consent form.  Plan on checking Echos every 3 months.   7/5/23 Visit - with response to treatment on exam, w/o palpable lesion in right breast.  To continue recommended treatment as planned with weekly carbo/taxol and phesgo every 21days.  Compazine prn nausea.  Stool negative for CDiff, and culture.  To use immodium prn loose stools. reviewed instructions with patient, max dose per day 16mg. --reminded to take immodium with each loose stool. Prescribed Lomotil prn in addition to immodium.  To monitor closely for any signs and symptoms of peripheral neuropathy. So far no signs/symptoms of neuropathy present.  7/26/23 - treatment held due to low platelet count of 57, to d/c carbo. 8/2/23 - treatment held due to poor oral PO intake due to  oral mucositis/diarrhea, dehydration, also .  To give IVF 1L NS today, hold treatment.  Magic mouth wash TID before meals given mucositis.   8/9/23 To hold therapy today given ANC <1, reassured that treatment will be resumed next week w/o carbo. To drop carbo from treatment given cytopenias.   8/16/23 To proceed with weekly Taxol/Phesgo today.  Blood counts improved.  Carbo discontinued indefinitely.   RTC in 1 week for treatment and follow up.

## 2023-08-16 NOTE — HISTORY OF PRESENT ILLNESS
[de-identified] : Patient here for f/u and treatment, with improved blood counts.    Denies fevers, SOB and CP and dizziness.    [FreeTextEntry1] : Patient is a 68yo woman with h/o CAD (seen by cardiology in past for ?tachycardia as per patient but w/o follow up) referred by Dr. Roy for a newly diagnosed multicentric right breast invasive ductal carcinoma ER/[place] negative, HER-2 positive. Here today for f/u and tx.   Patient presents to initial visit on 6/5/23 with her aunt.   The patient's history began when she underwent bilateral screening mammogram on 4/13/2023.  Of note, the patient's last breast imaging was in 2021. She states that she was dealing with illness in her mother and subsequent death of her mother in 2022.  She was found to have scattered fibroglandular density.  There was a spiculated mass with calcifications at the 6 to 7 o'clock position 5 cm from the nipple.  There was a smaller mass in the more posterior 7 o'clock position 11 cm from the nipple.  There was an asymmetry in the right breast 6 cm from the nipple.  There was also pleomorphic calcifications in the mid to posterior right breast for which additional imaging was recommended.  The patient underwent right breast diagnostic mammogram and sonogram on 4/26/2023.  She was found to have the findings described above for which biopsy was recommended of all the sites.  The patient underwent right breast 6:00 biopsy of the 2.2 cm spiculated mass which returned as invasive ductal carcinoma, poorly differentiated, ER negative, [place] negative, HER2 positive.  She also underwent right breast ultrasound-guided biopsy of the 8:00 0.9 cm mass.  Pathology returned as invasive ductal carcinoma, ER negative, [place] negative, HER2 positive.  At the right breast 5 o'clock position stereotactic core biopsy was performed of the calcifications which returned as a microinvasive carcinoma with DCIS, high nuclear grade. Of note, there are highly suspicious calcifications identified between the calcifications sampled and the 8:00 posterior mass.  The findings are compatible with multicentric breast cancer involving at least 2 quadrants and spanning over 9 cm.    MRI breast on 6/2/23 showed extensive disease in the right breast with the dominant tumor abutting the skin, and two sites of suspicion on the left breast recommended for MRI biopsies. Area of skin abutting the tumor will be marked by Dr Montgomery from radiation oncology as per Dr Roy prior to pt starting chemo.

## 2023-08-16 NOTE — REVIEW OF SYSTEMS
[Fatigue] : fatigue [Diarrhea: Grade 4 - Life-threatening consequences; urgent intervention indicated] : Diarrhea: Grade 4 - Life-threatening consequences; urgent intervention indicated [Negative] : Allergic/Immunologic [FreeTextEntry9] : mild ongoing body aches d/t hip  [de-identified] : walks with a cane, needs R hip replacement, hx of L hip replacement

## 2023-08-23 ENCOUNTER — APPOINTMENT (OUTPATIENT)
Dept: INFUSION THERAPY | Facility: CLINIC | Age: 68
End: 2023-08-23

## 2023-08-23 ENCOUNTER — APPOINTMENT (OUTPATIENT)
Dept: HEMATOLOGY ONCOLOGY | Facility: CLINIC | Age: 68
End: 2023-08-23
Payer: MEDICARE

## 2023-08-23 ENCOUNTER — LABORATORY RESULT (OUTPATIENT)
Age: 68
End: 2023-08-23

## 2023-08-23 ENCOUNTER — NON-APPOINTMENT (OUTPATIENT)
Age: 68
End: 2023-08-23

## 2023-08-23 ENCOUNTER — OUTPATIENT (OUTPATIENT)
Dept: OUTPATIENT SERVICES | Facility: HOSPITAL | Age: 68
LOS: 1 days | End: 2023-08-23
Payer: MEDICARE

## 2023-08-23 VITALS
HEART RATE: 82 BPM | TEMPERATURE: 99 F | SYSTOLIC BLOOD PRESSURE: 119 MMHG | DIASTOLIC BLOOD PRESSURE: 73 MMHG | RESPIRATION RATE: 18 BRPM | OXYGEN SATURATION: 97 %

## 2023-08-23 VITALS
HEIGHT: 66 IN | OXYGEN SATURATION: 97 % | TEMPERATURE: 98.1 F | SYSTOLIC BLOOD PRESSURE: 158 MMHG | RESPIRATION RATE: 18 BRPM | DIASTOLIC BLOOD PRESSURE: 81 MMHG | WEIGHT: 192 LBS | BODY MASS INDEX: 30.86 KG/M2 | HEART RATE: 88 BPM

## 2023-08-23 VITALS
TEMPERATURE: 98 F | DIASTOLIC BLOOD PRESSURE: 81 MMHG | SYSTOLIC BLOOD PRESSURE: 158 MMHG | HEIGHT: 66 IN | RESPIRATION RATE: 18 BRPM | OXYGEN SATURATION: 97 % | HEART RATE: 88 BPM | WEIGHT: 192.02 LBS

## 2023-08-23 DIAGNOSIS — C50.911 MALIGNANT NEOPLASM OF UNSPECIFIED SITE OF RIGHT FEMALE BREAST: ICD-10-CM

## 2023-08-23 DIAGNOSIS — Z90.49 ACQUIRED ABSENCE OF OTHER SPECIFIED PARTS OF DIGESTIVE TRACT: Chronic | ICD-10-CM

## 2023-08-23 DIAGNOSIS — Z90.89 ACQUIRED ABSENCE OF OTHER ORGANS: Chronic | ICD-10-CM

## 2023-08-23 DIAGNOSIS — Z98.890 OTHER SPECIFIED POSTPROCEDURAL STATES: Chronic | ICD-10-CM

## 2023-08-23 LAB
ALBUMIN SERPL ELPH-MCNC: 3.6 G/DL
ALP BLD-CCNC: 76 U/L
ALT SERPL-CCNC: 18 U/L
ANION GAP SERPL CALC-SCNC: 12 MMOL/L
AST SERPL-CCNC: 23 U/L
BILIRUB SERPL-MCNC: 0.6 MG/DL
BUN SERPL-MCNC: 18 MG/DL
CALCIUM SERPL-MCNC: 9.3 MG/DL
CHLORIDE SERPL-SCNC: 110 MMOL/L
CO2 SERPL-SCNC: 25 MMOL/L
CREAT SERPL-MCNC: 0.8 MG/DL
EGFR: 80 ML/MIN/1.73M2
GLUCOSE SERPL-MCNC: 106 MG/DL
POTASSIUM SERPL-SCNC: 4 MMOL/L
PROT SERPL-MCNC: 6.7 G/DL
SODIUM SERPL-SCNC: 147 MMOL/L

## 2023-08-23 PROCEDURE — 96375 TX/PRO/DX INJ NEW DRUG ADDON: CPT

## 2023-08-23 PROCEDURE — 99214 OFFICE O/P EST MOD 30 MIN: CPT | Mod: 25

## 2023-08-23 PROCEDURE — 96413 CHEMO IV INFUSION 1 HR: CPT

## 2023-08-23 PROCEDURE — 36415 COLL VENOUS BLD VENIPUNCTURE: CPT

## 2023-08-23 RX ORDER — PACLITAXEL 6 MG/ML
160 INJECTION, SOLUTION, CONCENTRATE INTRAVENOUS ONCE
Refills: 0 | Status: COMPLETED | OUTPATIENT
Start: 2023-08-23 | End: 2023-08-23

## 2023-08-23 RX ORDER — DEXAMETHASONE 0.5 MG/5ML
10 ELIXIR ORAL ONCE
Refills: 0 | Status: COMPLETED | OUTPATIENT
Start: 2023-08-23 | End: 2023-08-23

## 2023-08-23 RX ORDER — FAMOTIDINE 10 MG/ML
20 INJECTION INTRAVENOUS ONCE
Refills: 0 | Status: COMPLETED | OUTPATIENT
Start: 2023-08-23 | End: 2023-08-23

## 2023-08-23 RX ORDER — ONDANSETRON 8 MG/1
8 TABLET, FILM COATED ORAL ONCE
Refills: 0 | Status: COMPLETED | OUTPATIENT
Start: 2023-08-23 | End: 2023-08-23

## 2023-08-23 RX ORDER — ACETAMINOPHEN 500 MG
650 TABLET ORAL ONCE
Refills: 0 | Status: COMPLETED | OUTPATIENT
Start: 2023-08-23 | End: 2023-08-23

## 2023-08-23 RX ADMIN — Medication 204 MILLIGRAM(S): at 11:15

## 2023-08-23 RX ADMIN — Medication 300 UNIT(S): at 13:05

## 2023-08-23 RX ADMIN — Medication 650 MILLIGRAM(S): at 12:25

## 2023-08-23 RX ADMIN — PACLITAXEL 160 MILLIGRAM(S): 6 INJECTION, SOLUTION, CONCENTRATE INTRAVENOUS at 11:49

## 2023-08-23 RX ADMIN — Medication 10 MILLIGRAM(S): at 11:30

## 2023-08-23 RX ADMIN — PACLITAXEL 160 MILLIGRAM(S): 6 INJECTION, SOLUTION, CONCENTRATE INTRAVENOUS at 12:55

## 2023-08-23 RX ADMIN — FAMOTIDINE 208 MILLIGRAM(S): 10 INJECTION INTRAVENOUS at 10:56

## 2023-08-23 RX ADMIN — Medication 650 MILLIGRAM(S): at 10:55

## 2023-08-23 RX ADMIN — ONDANSETRON 8 MILLIGRAM(S): 8 TABLET, FILM COATED ORAL at 11:49

## 2023-08-23 RX ADMIN — ONDANSETRON 216 MILLIGRAM(S): 8 TABLET, FILM COATED ORAL at 11:34

## 2023-08-23 RX ADMIN — FAMOTIDINE 20 MILLIGRAM(S): 10 INJECTION INTRAVENOUS at 11:11

## 2023-08-23 NOTE — PHYSICAL EXAM
[Restricted in physically strenuous activity but ambulatory and able to carry out work of a light or sedentary nature] : Status 1- Restricted in physically strenuous activity but ambulatory and able to carry out work of a light or sedentary nature, e.g., light house work, office work [Normal] : affect appropriate [de-identified] : palpable 2-3cm hard mobile mass in right breast at about 6 oclock position 5cm from nipple - no longer palpable as of 7/5/23, no longer palpable lesion exam 8/16

## 2023-08-23 NOTE — HISTORY OF PRESENT ILLNESS
[de-identified] : Patient here for f/u and treatment, with improved blood counts.    Denies fevers, SOB and CP and dizziness. Denies signs of neuropathy.  Reports having a reaction/feeling anxious and jittery during the benadryl administration.  [FreeTextEntry1] : Patient is a 66yo woman with h/o CAD (seen by cardiology in past for ?tachycardia as per patient but w/o follow up) referred by Dr. Roy for a newly diagnosed multicentric right breast invasive ductal carcinoma ER/DC negative, HER-2 positive. Here today for f/u and tx.   Patient presents to initial visit on 6/5/23 with her aunt.   The patient's history began when she underwent bilateral screening mammogram on 4/13/2023.  Of note, the patient's last breast imaging was in 2021. She states that she was dealing with illness in her mother and subsequent death of her mother in 2022.  She was found to have scattered fibroglandular density.  There was a spiculated mass with calcifications at the 6 to 7 o'clock position 5 cm from the nipple.  There was a smaller mass in the more posterior 7 o'clock position 11 cm from the nipple.  There was an asymmetry in the right breast 6 cm from the nipple.  There was also pleomorphic calcifications in the mid to posterior right breast for which additional imaging was recommended.  The patient underwent right breast diagnostic mammogram and sonogram on 4/26/2023.  She was found to have the findings described above for which biopsy was recommended of all the sites.  The patient underwent right breast 6:00 biopsy of the 2.2 cm spiculated mass which returned as invasive ductal carcinoma, poorly differentiated, ER negative, DC negative, HER2 positive.  She also underwent right breast ultrasound-guided biopsy of the 8:00 0.9 cm mass.  Pathology returned as invasive ductal carcinoma, ER negative, DC negative, HER2 positive.  At the right breast 5 o'clock position stereotactic core biopsy was performed of the calcifications which returned as a microinvasive carcinoma with DCIS, high nuclear grade. Of note, there are highly suspicious calcifications identified between the calcifications sampled and the 8:00 posterior mass.  The findings are compatible with multicentric breast cancer involving at least 2 quadrants and spanning over 9 cm.    MRI breast on 6/2/23 showed extensive disease in the right breast with the dominant tumor abutting the skin, and two sites of suspicion on the left breast recommended for MRI biopsies. Area of skin abutting the tumor will be marked by Dr Montgomery from radiation oncology as per Dr Roy prior to pt starting chemo.

## 2023-08-23 NOTE — ASSESSMENT
[FreeTextEntry1] : Patient is a 68yo woman with h/o CAD (seen by cardiology in past for ?tachycardia as per patient but w/o follow up) referred by Dr. Roy for a newly diagnosed multicentric right breast invasive ductal carcinoma ER/[place] negative, HER-2 positive. On MRI breast patient's disease spans 9cm with suspicious calcifications in between biopsied masses c/w IDC ER/WV- Her 2+.   cT3 (area of concern measuring 8.7cm on MRI, possibly multicentric) cN0 Stage IIB  Reviewed the biopsy results c/w ER/[place]- Her2 positive breast cancer spanning a large part of patient's breast. I recommended neoadjuvant chemotherapy with dual Her 2 blockade with weekly Taxol/Carbo and Phesgo every 3 weeks over a 12 week period. I reviewed potential side effects including but not limited to allergic reactions, infusion reactions, fatigue, lowered immune system with lower blood counts, risk of neutropenic fever, needing growth factor support, nausea/vomiting, diarrhea, kidney and liver dysfunction, electrolyte abnormalities, peripheral neuropathy and reversible cardiomyopathy with dual her 2 agents. Patient expressed verbal understanding signing consent.   Reviewed curative intent goal of care and needing an MRI breast after neoadj treatment with surgery following the repeat MRI breast to assess response.   Plan prior to chemo: Echo - normal EF 5/2023. Chemo port by IR -placed at [facility] IR.  6/21/23 Visit - Starting weekly carbo/taxol and phesgo [id number]. Compazine prn nausea.  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 I reviewed that treatment with Taxol is standard of care and asked her to keep a daily journal documenting her symptoms of numbness/tingling in her hands and feet. I reviewed with her that should she develop neuropathy we will have to re-evaluate risks/benefits of continuing treatment with weekly Taxol and may have to adjust therapy.  Also discussed that this is dose dependent and usually occurs with later weeks of treatment.  To monitor closely for any signs and symptoms of peripheral neuropathy.  Patient had all her questions answered regarding treatment and signed consent form.  Plan on checking Echos every 3 months.   7/5/23 Visit - with response to treatment on exam, w/o palpable lesion in right breast.  To continue recommended treatment as planned with weekly carbo/taxol and phesgo every 21days.  Compazine prn nausea.  Stool negative for CDiff, and culture.  To use immodium prn loose stools. reviewed instructions with patient, max dose per day 16mg. --reminded to take immodium with each loose stool. Prescribed Lomotil prn in addition to immodium.  To monitor closely for any signs and symptoms of peripheral neuropathy. So far no signs/symptoms of neuropathy present.  7/26/23 - treatment held due to low platelet count of 57, to d/c carbo. 8/2/23 - treatment held due to poor oral PO intake due to  oral mucositis/diarrhea, dehydration, also .  To give IVF 1L NS today, hold treatment.  Magic mouth wash TID before meals given mucositis.   8/9/23 To hold therapy today given ANC <1, reassured that treatment will be resumed next week w/o carbo. To drop carbo from treatment given cytopenias.   8/16/23 To proceed with weekly Taxol/Phesgo today.  Blood counts improved.  Carbo discontinued indefinitely.   8/23/23 To proceed with weekly Taxol today. Phesgo is every 21 days to be continued.  Carbo discontinued due to low ANC in the past.  To continue with treatment without Benadryl today but with keeping Dexamethasone at 10mg. Can decrease dose of Decadron to 4 with next cycle if no reaction this week.  RTC in 1 week for treatment, to continue weekly treatment and to schedule follow up in 3 weeks.

## 2023-08-23 NOTE — REVIEW OF SYSTEMS
[Fatigue] : fatigue [Diarrhea: Grade 4 - Life-threatening consequences; urgent intervention indicated] : Diarrhea: Grade 4 - Life-threatening consequences; urgent intervention indicated [Negative] : Allergic/Immunologic [FreeTextEntry9] : mild ongoing body aches d/t hip  [de-identified] : walks with a cane, needs R hip replacement, hx of L hip replacement

## 2023-08-24 LAB — TSH SERPL-ACNC: 0.34 UIU/ML

## 2023-08-28 ENCOUNTER — APPOINTMENT (OUTPATIENT)
Dept: HEMATOLOGY ONCOLOGY | Facility: CLINIC | Age: 68
End: 2023-08-28

## 2023-08-30 ENCOUNTER — APPOINTMENT (OUTPATIENT)
Dept: INFUSION THERAPY | Facility: CLINIC | Age: 68
End: 2023-08-30

## 2023-08-30 ENCOUNTER — OUTPATIENT (OUTPATIENT)
Dept: OUTPATIENT SERVICES | Facility: HOSPITAL | Age: 68
LOS: 1 days | End: 2023-08-30
Payer: MEDICARE

## 2023-08-30 VITALS
RESPIRATION RATE: 17 BRPM | SYSTOLIC BLOOD PRESSURE: 132 MMHG | HEART RATE: 78 BPM | DIASTOLIC BLOOD PRESSURE: 78 MMHG | OXYGEN SATURATION: 98 % | TEMPERATURE: 98 F

## 2023-08-30 VITALS
RESPIRATION RATE: 18 BRPM | OXYGEN SATURATION: 97 % | WEIGHT: 231.93 LBS | TEMPERATURE: 98 F | SYSTOLIC BLOOD PRESSURE: 126 MMHG | DIASTOLIC BLOOD PRESSURE: 77 MMHG | HEART RATE: 81 BPM | HEIGHT: 66 IN

## 2023-08-30 DIAGNOSIS — Z90.49 ACQUIRED ABSENCE OF OTHER SPECIFIED PARTS OF DIGESTIVE TRACT: Chronic | ICD-10-CM

## 2023-08-30 DIAGNOSIS — Z98.890 OTHER SPECIFIED POSTPROCEDURAL STATES: Chronic | ICD-10-CM

## 2023-08-30 DIAGNOSIS — C50.911 MALIGNANT NEOPLASM OF UNSPECIFIED SITE OF RIGHT FEMALE BREAST: ICD-10-CM

## 2023-08-30 DIAGNOSIS — Z90.89 ACQUIRED ABSENCE OF OTHER ORGANS: Chronic | ICD-10-CM

## 2023-08-30 LAB
ALBUMIN SERPL ELPH-MCNC: 3.7 G/DL — SIGNIFICANT CHANGE UP (ref 3.3–5)
ALP SERPL-CCNC: 79 U/L — SIGNIFICANT CHANGE UP (ref 40–120)
ALT FLD-CCNC: 20 U/L — SIGNIFICANT CHANGE UP (ref 10–45)
ANION GAP SERPL CALC-SCNC: 16 MMOL/L — SIGNIFICANT CHANGE UP (ref 5–17)
AST SERPL-CCNC: 25 U/L — SIGNIFICANT CHANGE UP (ref 10–40)
BILIRUB SERPL-MCNC: 0.6 MG/DL — SIGNIFICANT CHANGE UP (ref 0.2–1.2)
BUN SERPL-MCNC: 14 MG/DL — SIGNIFICANT CHANGE UP (ref 7–23)
CALCIUM SERPL-MCNC: 9.6 MG/DL — SIGNIFICANT CHANGE UP (ref 8.4–10.5)
CHLORIDE SERPL-SCNC: 107 MMOL/L — SIGNIFICANT CHANGE UP (ref 96–108)
CO2 SERPL-SCNC: 24 MMOL/L — SIGNIFICANT CHANGE UP (ref 22–31)
CREAT SERPL-MCNC: 0.6 MG/DL — SIGNIFICANT CHANGE UP (ref 0.5–1.3)
EGFR: 97 ML/MIN/1.73M2 — SIGNIFICANT CHANGE UP
GLUCOSE SERPL-MCNC: 99 MG/DL — SIGNIFICANT CHANGE UP (ref 70–99)
HCT VFR BLD CALC: 31 % — LOW (ref 34.5–45)
HGB BLD-MCNC: 10.5 G/DL — LOW (ref 11.5–15.5)
LYMPHOCYTES # BLD AUTO: 1.5 K/UL — SIGNIFICANT CHANGE UP (ref 1–3.3)
LYMPHOCYTES # BLD AUTO: 33.1 % — SIGNIFICANT CHANGE UP (ref 13–44)
MCHC RBC-ENTMCNC: 30 PG — SIGNIFICANT CHANGE UP (ref 27–34)
MCHC RBC-ENTMCNC: 33.9 GM/DL — SIGNIFICANT CHANGE UP (ref 32–36)
MCV RBC AUTO: 88.6 FL — SIGNIFICANT CHANGE UP (ref 80–100)
NEUTROPHILS # BLD AUTO: 2.7 K/UL — SIGNIFICANT CHANGE UP (ref 1.8–7.4)
NEUTROPHILS NFR BLD AUTO: 58.8 % — SIGNIFICANT CHANGE UP (ref 43–77)
PLATELET # BLD AUTO: 227 K/UL — SIGNIFICANT CHANGE UP (ref 150–400)
POTASSIUM SERPL-MCNC: 4 MMOL/L — SIGNIFICANT CHANGE UP (ref 3.5–5.3)
POTASSIUM SERPL-SCNC: 4 MMOL/L — SIGNIFICANT CHANGE UP (ref 3.5–5.3)
PROT SERPL-MCNC: 7 G/DL — SIGNIFICANT CHANGE UP (ref 6–8.3)
RBC # BLD: 3.5 M/UL — LOW (ref 3.8–5.2)
RBC # FLD: 16.3 % — HIGH (ref 10.3–14.5)
SODIUM SERPL-SCNC: 147 MMOL/L — HIGH (ref 135–145)
WBC # BLD: 4.6 K/UL — SIGNIFICANT CHANGE UP (ref 3.8–10.5)
WBC # FLD AUTO: 4.6 K/UL — SIGNIFICANT CHANGE UP (ref 3.8–10.5)

## 2023-08-30 PROCEDURE — 80053 COMPREHEN METABOLIC PANEL: CPT

## 2023-08-30 PROCEDURE — 96413 CHEMO IV INFUSION 1 HR: CPT

## 2023-08-30 PROCEDURE — 96375 TX/PRO/DX INJ NEW DRUG ADDON: CPT

## 2023-08-30 PROCEDURE — 36415 COLL VENOUS BLD VENIPUNCTURE: CPT

## 2023-08-30 PROCEDURE — 85025 COMPLETE CBC W/AUTO DIFF WBC: CPT

## 2023-08-30 RX ORDER — ONDANSETRON 8 MG/1
8 TABLET, FILM COATED ORAL ONCE
Refills: 0 | Status: COMPLETED | OUTPATIENT
Start: 2023-08-30 | End: 2023-08-30

## 2023-08-30 RX ORDER — PACLITAXEL 6 MG/ML
160 INJECTION, SOLUTION, CONCENTRATE INTRAVENOUS ONCE
Refills: 0 | Status: COMPLETED | OUTPATIENT
Start: 2023-08-30 | End: 2023-08-30

## 2023-08-30 RX ORDER — FAMOTIDINE 10 MG/ML
20 INJECTION INTRAVENOUS ONCE
Refills: 0 | Status: COMPLETED | OUTPATIENT
Start: 2023-08-30 | End: 2023-08-30

## 2023-08-30 RX ORDER — ACETAMINOPHEN 500 MG
650 TABLET ORAL ONCE
Refills: 0 | Status: COMPLETED | OUTPATIENT
Start: 2023-08-30 | End: 2023-08-30

## 2023-08-30 RX ORDER — DIPHENHYDRAMINE HCL 50 MG
25 CAPSULE ORAL ONCE
Refills: 0 | Status: COMPLETED | OUTPATIENT
Start: 2023-08-30 | End: 2023-08-30

## 2023-08-30 RX ORDER — DEXAMETHASONE 0.5 MG/5ML
4 ELIXIR ORAL ONCE
Refills: 0 | Status: COMPLETED | OUTPATIENT
Start: 2023-08-30 | End: 2023-08-30

## 2023-08-30 RX ADMIN — FAMOTIDINE 208 MILLIGRAM(S): 10 INJECTION INTRAVENOUS at 16:00

## 2023-08-30 RX ADMIN — Medication 650 MILLIGRAM(S): at 15:35

## 2023-08-30 RX ADMIN — PACLITAXEL 160 MILLIGRAM(S): 6 INJECTION, SOLUTION, CONCENTRATE INTRAVENOUS at 18:00

## 2023-08-30 RX ADMIN — ONDANSETRON 216 MILLIGRAM(S): 8 TABLET, FILM COATED ORAL at 16:15

## 2023-08-30 RX ADMIN — Medication 204 MILLIGRAM(S): at 15:44

## 2023-08-30 RX ADMIN — Medication 25 MILLIGRAM(S): at 17:00

## 2023-08-30 RX ADMIN — ONDANSETRON 8 MILLIGRAM(S): 8 TABLET, FILM COATED ORAL at 16:15

## 2023-08-30 RX ADMIN — Medication 202 MILLIGRAM(S): at 16:30

## 2023-08-30 RX ADMIN — PACLITAXEL 160 MILLIGRAM(S): 6 INJECTION, SOLUTION, CONCENTRATE INTRAVENOUS at 16:58

## 2023-08-30 RX ADMIN — Medication 300 UNIT(S): at 18:05

## 2023-08-30 RX ADMIN — Medication 4 MILLIGRAM(S): at 15:59

## 2023-08-30 RX ADMIN — FAMOTIDINE 20 MILLIGRAM(S): 10 INJECTION INTRAVENOUS at 16:15

## 2023-09-06 ENCOUNTER — APPOINTMENT (OUTPATIENT)
Dept: INFUSION THERAPY | Facility: CLINIC | Age: 68
End: 2023-09-06

## 2023-09-06 ENCOUNTER — OUTPATIENT (OUTPATIENT)
Dept: OUTPATIENT SERVICES | Facility: HOSPITAL | Age: 68
LOS: 1 days | End: 2023-09-06
Payer: MEDICARE

## 2023-09-06 VITALS
DIASTOLIC BLOOD PRESSURE: 60 MMHG | TEMPERATURE: 98 F | SYSTOLIC BLOOD PRESSURE: 122 MMHG | HEART RATE: 76 BPM | OXYGEN SATURATION: 99 % | RESPIRATION RATE: 18 BRPM

## 2023-09-06 VITALS
OXYGEN SATURATION: 98 % | SYSTOLIC BLOOD PRESSURE: 118 MMHG | TEMPERATURE: 98 F | HEART RATE: 81 BPM | RESPIRATION RATE: 18 BRPM | DIASTOLIC BLOOD PRESSURE: 77 MMHG

## 2023-09-06 DIAGNOSIS — C50.912 MALIGNANT NEOPLASM OF UNSPECIFIED SITE OF LEFT FEMALE BREAST: ICD-10-CM

## 2023-09-06 DIAGNOSIS — Z90.49 ACQUIRED ABSENCE OF OTHER SPECIFIED PARTS OF DIGESTIVE TRACT: Chronic | ICD-10-CM

## 2023-09-06 DIAGNOSIS — Z98.890 OTHER SPECIFIED POSTPROCEDURAL STATES: Chronic | ICD-10-CM

## 2023-09-06 DIAGNOSIS — Z90.89 ACQUIRED ABSENCE OF OTHER ORGANS: Chronic | ICD-10-CM

## 2023-09-06 LAB
ALBUMIN SERPL ELPH-MCNC: 3.8 G/DL — SIGNIFICANT CHANGE UP (ref 3.3–5)
ALP SERPL-CCNC: 65 U/L — SIGNIFICANT CHANGE UP (ref 40–120)
ALT FLD-CCNC: 22 U/L — SIGNIFICANT CHANGE UP (ref 10–45)
ANION GAP SERPL CALC-SCNC: 14 MMOL/L — SIGNIFICANT CHANGE UP (ref 5–17)
AST SERPL-CCNC: 25 U/L — SIGNIFICANT CHANGE UP (ref 10–40)
BILIRUB SERPL-MCNC: 0.7 MG/DL — SIGNIFICANT CHANGE UP (ref 0.2–1.2)
BUN SERPL-MCNC: 13 MG/DL — SIGNIFICANT CHANGE UP (ref 7–23)
CALCIUM SERPL-MCNC: 9.3 MG/DL — SIGNIFICANT CHANGE UP (ref 8.4–10.5)
CHLORIDE SERPL-SCNC: 110 MMOL/L — HIGH (ref 96–108)
CO2 SERPL-SCNC: 22 MMOL/L — SIGNIFICANT CHANGE UP (ref 22–31)
CREAT SERPL-MCNC: 0.7 MG/DL — SIGNIFICANT CHANGE UP (ref 0.5–1.3)
EGFR: 94 ML/MIN/1.73M2 — SIGNIFICANT CHANGE UP
GLUCOSE SERPL-MCNC: 110 MG/DL — HIGH (ref 70–99)
HCT VFR BLD CALC: 30.3 % — LOW (ref 34.5–45)
HGB BLD-MCNC: 10.3 G/DL — LOW (ref 11.5–15.5)
LYMPHOCYTES # BLD AUTO: 1 K/UL — SIGNIFICANT CHANGE UP (ref 1–3.3)
LYMPHOCYTES # BLD AUTO: 26.4 % — SIGNIFICANT CHANGE UP (ref 13–44)
MCHC RBC-ENTMCNC: 30.6 PG — SIGNIFICANT CHANGE UP (ref 27–34)
MCHC RBC-ENTMCNC: 34 GM/DL — SIGNIFICANT CHANGE UP (ref 32–36)
MCV RBC AUTO: 89.9 FL — SIGNIFICANT CHANGE UP (ref 80–100)
NEUTROPHILS # BLD AUTO: 2.4 K/UL — SIGNIFICANT CHANGE UP (ref 1.8–7.4)
NEUTROPHILS NFR BLD AUTO: 67.1 % — SIGNIFICANT CHANGE UP (ref 43–77)
PLATELET # BLD AUTO: 241 K/UL — SIGNIFICANT CHANGE UP (ref 150–400)
POTASSIUM SERPL-MCNC: 4.1 MMOL/L — SIGNIFICANT CHANGE UP (ref 3.5–5.3)
POTASSIUM SERPL-SCNC: 4.1 MMOL/L — SIGNIFICANT CHANGE UP (ref 3.5–5.3)
PROT SERPL-MCNC: 6.6 G/DL — SIGNIFICANT CHANGE UP (ref 6–8.3)
RBC # BLD: 3.37 M/UL — LOW (ref 3.8–5.2)
RBC # FLD: 16.2 % — HIGH (ref 10.3–14.5)
SODIUM SERPL-SCNC: 146 MMOL/L — HIGH (ref 135–145)
WBC # BLD: 3.6 K/UL — LOW (ref 3.8–10.5)
WBC # FLD AUTO: 3.6 K/UL — LOW (ref 3.8–10.5)

## 2023-09-06 PROCEDURE — 96401 CHEMO ANTI-NEOPL SQ/IM: CPT

## 2023-09-06 PROCEDURE — 36415 COLL VENOUS BLD VENIPUNCTURE: CPT

## 2023-09-06 PROCEDURE — 80053 COMPREHEN METABOLIC PANEL: CPT

## 2023-09-06 PROCEDURE — 96375 TX/PRO/DX INJ NEW DRUG ADDON: CPT

## 2023-09-06 PROCEDURE — 96413 CHEMO IV INFUSION 1 HR: CPT

## 2023-09-06 PROCEDURE — 85025 COMPLETE CBC W/AUTO DIFF WBC: CPT

## 2023-09-06 RX ORDER — ACETAMINOPHEN 500 MG
650 TABLET ORAL ONCE
Refills: 0 | Status: COMPLETED | OUTPATIENT
Start: 2023-09-06 | End: 2023-09-06

## 2023-09-06 RX ORDER — FAMOTIDINE 10 MG/ML
20 INJECTION INTRAVENOUS ONCE
Refills: 0 | Status: COMPLETED | OUTPATIENT
Start: 2023-09-06 | End: 2023-09-06

## 2023-09-06 RX ORDER — PACLITAXEL 6 MG/ML
160 INJECTION, SOLUTION, CONCENTRATE INTRAVENOUS ONCE
Refills: 0 | Status: COMPLETED | OUTPATIENT
Start: 2023-09-06 | End: 2023-09-06

## 2023-09-06 RX ORDER — DEXAMETHASONE 0.5 MG/5ML
4 ELIXIR ORAL ONCE
Refills: 0 | Status: COMPLETED | OUTPATIENT
Start: 2023-09-06 | End: 2023-09-06

## 2023-09-06 RX ORDER — ONDANSETRON 8 MG/1
8 TABLET, FILM COATED ORAL ONCE
Refills: 0 | Status: COMPLETED | OUTPATIENT
Start: 2023-09-06 | End: 2023-09-06

## 2023-09-06 RX ORDER — DIPHENHYDRAMINE HCL 50 MG
25 CAPSULE ORAL ONCE
Refills: 0 | Status: COMPLETED | OUTPATIENT
Start: 2023-09-06 | End: 2023-09-06

## 2023-09-06 RX ORDER — PERTUZUMAB, TRASTUZUMAB, AND HYALURONIDASE-ZZXF 600; 600; 2000 MG/10ML; MG/10ML; U/10ML
10 INJECTION, SOLUTION SUBCUTANEOUS ONCE
Refills: 0 | Status: COMPLETED | OUTPATIENT
Start: 2023-09-06 | End: 2023-09-06

## 2023-09-06 RX ADMIN — Medication 25 MILLIGRAM(S): at 10:00

## 2023-09-06 RX ADMIN — ONDANSETRON 8 MILLIGRAM(S): 8 TABLET, FILM COATED ORAL at 10:30

## 2023-09-06 RX ADMIN — ONDANSETRON 216 MILLIGRAM(S): 8 TABLET, FILM COATED ORAL at 10:15

## 2023-09-06 RX ADMIN — PACLITAXEL 160 MILLIGRAM(S): 6 INJECTION, SOLUTION, CONCENTRATE INTRAVENOUS at 12:10

## 2023-09-06 RX ADMIN — PACLITAXEL 160 MILLIGRAM(S): 6 INJECTION, SOLUTION, CONCENTRATE INTRAVENOUS at 11:10

## 2023-09-06 RX ADMIN — FAMOTIDINE 208 MILLIGRAM(S): 10 INJECTION INTRAVENOUS at 10:30

## 2023-09-06 RX ADMIN — PERTUZUMAB, TRASTUZUMAB, AND HYALURONIDASE-ZZXF 10 MILLILITER(S): 600; 600; 2000 INJECTION, SOLUTION SUBCUTANEOUS at 09:39

## 2023-09-06 RX ADMIN — FAMOTIDINE 20 MILLIGRAM(S): 10 INJECTION INTRAVENOUS at 10:45

## 2023-09-06 RX ADMIN — Medication 650 MILLIGRAM(S): at 10:37

## 2023-09-06 RX ADMIN — Medication 300 UNIT(S): at 12:15

## 2023-09-06 RX ADMIN — Medication 4 MILLIGRAM(S): at 10:15

## 2023-09-06 RX ADMIN — Medication 204 MILLIGRAM(S): at 10:00

## 2023-09-06 RX ADMIN — Medication 650 MILLIGRAM(S): at 11:07

## 2023-09-13 ENCOUNTER — LABORATORY RESULT (OUTPATIENT)
Age: 68
End: 2023-09-13

## 2023-09-13 ENCOUNTER — APPOINTMENT (OUTPATIENT)
Dept: INFUSION THERAPY | Facility: CLINIC | Age: 68
End: 2023-09-13

## 2023-09-13 ENCOUNTER — OUTPATIENT (OUTPATIENT)
Dept: OUTPATIENT SERVICES | Facility: HOSPITAL | Age: 68
LOS: 1 days | End: 2023-09-13
Payer: MEDICARE

## 2023-09-13 ENCOUNTER — APPOINTMENT (OUTPATIENT)
Dept: HEMATOLOGY ONCOLOGY | Facility: CLINIC | Age: 68
End: 2023-09-13
Payer: MEDICARE

## 2023-09-13 ENCOUNTER — NON-APPOINTMENT (OUTPATIENT)
Age: 68
End: 2023-09-13

## 2023-09-13 VITALS
HEIGHT: 66 IN | RESPIRATION RATE: 16 BRPM | WEIGHT: 186.95 LBS | OXYGEN SATURATION: 97 % | SYSTOLIC BLOOD PRESSURE: 110 MMHG | TEMPERATURE: 97 F | HEART RATE: 83 BPM | DIASTOLIC BLOOD PRESSURE: 75 MMHG

## 2023-09-13 VITALS
SYSTOLIC BLOOD PRESSURE: 109 MMHG | RESPIRATION RATE: 18 BRPM | HEART RATE: 61 BPM | OXYGEN SATURATION: 98 % | TEMPERATURE: 97 F | DIASTOLIC BLOOD PRESSURE: 72 MMHG

## 2023-09-13 VITALS
RESPIRATION RATE: 18 BRPM | HEIGHT: 66 IN | WEIGHT: 187 LBS | DIASTOLIC BLOOD PRESSURE: 75 MMHG | HEART RATE: 83 BPM | OXYGEN SATURATION: 97 % | BODY MASS INDEX: 30.05 KG/M2 | TEMPERATURE: 95.8 F | SYSTOLIC BLOOD PRESSURE: 110 MMHG

## 2023-09-13 DIAGNOSIS — Z90.89 ACQUIRED ABSENCE OF OTHER ORGANS: Chronic | ICD-10-CM

## 2023-09-13 DIAGNOSIS — C50.912 MALIGNANT NEOPLASM OF UNSPECIFIED SITE OF LEFT FEMALE BREAST: ICD-10-CM

## 2023-09-13 DIAGNOSIS — Z98.890 OTHER SPECIFIED POSTPROCEDURAL STATES: Chronic | ICD-10-CM

## 2023-09-13 DIAGNOSIS — Z90.49 ACQUIRED ABSENCE OF OTHER SPECIFIED PARTS OF DIGESTIVE TRACT: Chronic | ICD-10-CM

## 2023-09-13 DIAGNOSIS — R68.89 OTHER GENERAL SYMPTOMS AND SIGNS: ICD-10-CM

## 2023-09-13 PROCEDURE — 99214 OFFICE O/P EST MOD 30 MIN: CPT | Mod: 25

## 2023-09-13 PROCEDURE — 96375 TX/PRO/DX INJ NEW DRUG ADDON: CPT

## 2023-09-13 PROCEDURE — 96413 CHEMO IV INFUSION 1 HR: CPT

## 2023-09-13 PROCEDURE — 36415 COLL VENOUS BLD VENIPUNCTURE: CPT

## 2023-09-13 RX ORDER — SODIUM CHLORIDE 9 MG/ML
500 INJECTION INTRAMUSCULAR; INTRAVENOUS; SUBCUTANEOUS ONCE
Refills: 0 | Status: COMPLETED | OUTPATIENT
Start: 2023-09-13 | End: 2023-09-13

## 2023-09-13 RX ORDER — ACETAMINOPHEN 500 MG
650 TABLET ORAL ONCE
Refills: 0 | Status: COMPLETED | OUTPATIENT
Start: 2023-09-13 | End: 2023-09-13

## 2023-09-13 RX ORDER — DEXAMETHASONE 0.5 MG/5ML
4 ELIXIR ORAL ONCE
Refills: 0 | Status: COMPLETED | OUTPATIENT
Start: 2023-09-13 | End: 2023-09-13

## 2023-09-13 RX ORDER — FAMOTIDINE 10 MG/ML
20 INJECTION INTRAVENOUS ONCE
Refills: 0 | Status: COMPLETED | OUTPATIENT
Start: 2023-09-13 | End: 2023-09-13

## 2023-09-13 RX ORDER — DIPHENHYDRAMINE HCL 50 MG
25 CAPSULE ORAL ONCE
Refills: 0 | Status: DISCONTINUED | OUTPATIENT
Start: 2023-09-13 | End: 2023-09-13

## 2023-09-13 RX ORDER — PACLITAXEL 6 MG/ML
160 INJECTION, SOLUTION, CONCENTRATE INTRAVENOUS ONCE
Refills: 0 | Status: COMPLETED | OUTPATIENT
Start: 2023-09-13 | End: 2023-09-13

## 2023-09-13 RX ORDER — ONDANSETRON 8 MG/1
8 TABLET, FILM COATED ORAL ONCE
Refills: 0 | Status: COMPLETED | OUTPATIENT
Start: 2023-09-13 | End: 2023-09-13

## 2023-09-13 RX ADMIN — Medication 300 UNIT(S): at 15:55

## 2023-09-13 RX ADMIN — PACLITAXEL 160 MILLIGRAM(S): 6 INJECTION, SOLUTION, CONCENTRATE INTRAVENOUS at 14:51

## 2023-09-13 RX ADMIN — ONDANSETRON 216 MILLIGRAM(S): 8 TABLET, FILM COATED ORAL at 14:10

## 2023-09-13 RX ADMIN — FAMOTIDINE 208 MILLIGRAM(S): 10 INJECTION INTRAVENOUS at 13:52

## 2023-09-13 RX ADMIN — ONDANSETRON 8 MILLIGRAM(S): 8 TABLET, FILM COATED ORAL at 14:25

## 2023-09-13 RX ADMIN — Medication 4 MILLIGRAM(S): at 14:42

## 2023-09-13 RX ADMIN — Medication 204 MILLIGRAM(S): at 14:26

## 2023-09-13 RX ADMIN — FAMOTIDINE 20 MILLIGRAM(S): 10 INJECTION INTRAVENOUS at 14:08

## 2023-09-13 RX ADMIN — SODIUM CHLORIDE 500 MILLILITER(S): 9 INJECTION INTRAMUSCULAR; INTRAVENOUS; SUBCUTANEOUS at 13:44

## 2023-09-13 RX ADMIN — PACLITAXEL 160 MILLIGRAM(S): 6 INJECTION, SOLUTION, CONCENTRATE INTRAVENOUS at 15:53

## 2023-09-13 RX ADMIN — Medication 650 MILLIGRAM(S): at 13:43

## 2023-09-13 RX ADMIN — SODIUM CHLORIDE 500 MILLILITER(S): 9 INJECTION INTRAMUSCULAR; INTRAVENOUS; SUBCUTANEOUS at 14:45

## 2023-09-13 RX ADMIN — Medication 650 MILLIGRAM(S): at 14:10

## 2023-09-15 LAB
ALBUMIN SERPL ELPH-MCNC: 3.9 G/DL
ALP BLD-CCNC: 72 U/L
ALT SERPL-CCNC: 23 U/L
ANION GAP SERPL CALC-SCNC: 14 MMOL/L
AST SERPL-CCNC: 28 U/L
BILIRUB SERPL-MCNC: 1 MG/DL
BUN SERPL-MCNC: 14 MG/DL
CALCIUM SERPL-MCNC: 9.4 MG/DL
CHLORIDE SERPL-SCNC: 108 MMOL/L
CO2 SERPL-SCNC: 23 MMOL/L
CREAT SERPL-MCNC: 0.8 MG/DL
EGFR: 80 ML/MIN/1.73M2
GLUCOSE SERPL-MCNC: 102 MG/DL
POTASSIUM SERPL-SCNC: 3.9 MMOL/L
PROT SERPL-MCNC: 6.7 G/DL
SODIUM SERPL-SCNC: 145 MMOL/L

## 2023-09-18 ENCOUNTER — LABORATORY RESULT (OUTPATIENT)
Age: 68
End: 2023-09-18

## 2023-09-18 ENCOUNTER — APPOINTMENT (OUTPATIENT)
Dept: ENDOCRINOLOGY | Facility: CLINIC | Age: 68
End: 2023-09-18
Payer: MEDICARE

## 2023-09-18 ENCOUNTER — NON-APPOINTMENT (OUTPATIENT)
Age: 68
End: 2023-09-18

## 2023-09-18 VITALS
SYSTOLIC BLOOD PRESSURE: 128 MMHG | WEIGHT: 184 LBS | OXYGEN SATURATION: 96 % | BODY MASS INDEX: 29.57 KG/M2 | HEART RATE: 87 BPM | DIASTOLIC BLOOD PRESSURE: 80 MMHG | HEIGHT: 66 IN | TEMPERATURE: 97.7 F

## 2023-09-18 DIAGNOSIS — R73.9 HYPERGLYCEMIA, UNSPECIFIED: ICD-10-CM

## 2023-09-18 DIAGNOSIS — Z80.42 FAMILY HISTORY OF MALIGNANT NEOPLASM OF PROSTATE: ICD-10-CM

## 2023-09-18 DIAGNOSIS — Z86.39 PERSONAL HISTORY OF OTHER ENDOCRINE, NUTRITIONAL AND METABOLIC DISEASE: ICD-10-CM

## 2023-09-18 LAB
GLUCOSE BLDC GLUCOMTR-MCNC: 108
HBA1C MFR BLD HPLC: 5.4

## 2023-09-18 PROCEDURE — 99204 OFFICE O/P NEW MOD 45 MIN: CPT

## 2023-09-18 RX ORDER — LIDOCAINE HYDROCHLORIDE 20 MG/ML
2 SOLUTION ORAL; TOPICAL
Qty: 500 | Refills: 0 | Status: DISCONTINUED | COMMUNITY
Start: 2023-08-03 | End: 2023-09-18

## 2023-09-18 RX ORDER — PROCHLORPERAZINE MALEATE 10 MG/1
10 TABLET ORAL
Qty: 30 | Refills: 2 | Status: DISCONTINUED | COMMUNITY
Start: 2023-06-12 | End: 2023-09-18

## 2023-09-19 LAB
T3 SERPL-MCNC: 177 NG/DL
T4 FREE SERPL-MCNC: 1.2 NG/DL
TSH SERPL-ACNC: 0.3 UIU/ML

## 2023-09-20 ENCOUNTER — OUTPATIENT (OUTPATIENT)
Dept: OUTPATIENT SERVICES | Facility: HOSPITAL | Age: 68
LOS: 1 days | End: 2023-09-20
Payer: MEDICARE

## 2023-09-20 ENCOUNTER — APPOINTMENT (OUTPATIENT)
Dept: INFUSION THERAPY | Facility: CLINIC | Age: 68
End: 2023-09-20

## 2023-09-20 VITALS
OXYGEN SATURATION: 97 % | TEMPERATURE: 98 F | HEART RATE: 78 BPM | RESPIRATION RATE: 18 BRPM | DIASTOLIC BLOOD PRESSURE: 72 MMHG | SYSTOLIC BLOOD PRESSURE: 119 MMHG

## 2023-09-20 VITALS
HEIGHT: 66 IN | RESPIRATION RATE: 16 BRPM | SYSTOLIC BLOOD PRESSURE: 115 MMHG | TEMPERATURE: 97 F | DIASTOLIC BLOOD PRESSURE: 66 MMHG | HEART RATE: 90 BPM | WEIGHT: 156.97 LBS | OXYGEN SATURATION: 98 %

## 2023-09-20 DIAGNOSIS — Z90.49 ACQUIRED ABSENCE OF OTHER SPECIFIED PARTS OF DIGESTIVE TRACT: Chronic | ICD-10-CM

## 2023-09-20 DIAGNOSIS — Z90.89 ACQUIRED ABSENCE OF OTHER ORGANS: Chronic | ICD-10-CM

## 2023-09-20 DIAGNOSIS — C50.912 MALIGNANT NEOPLASM OF UNSPECIFIED SITE OF LEFT FEMALE BREAST: ICD-10-CM

## 2023-09-20 DIAGNOSIS — Z98.890 OTHER SPECIFIED POSTPROCEDURAL STATES: Chronic | ICD-10-CM

## 2023-09-20 LAB
ALBUMIN SERPL ELPH-MCNC: 3.8 G/DL — SIGNIFICANT CHANGE UP (ref 3.3–5)
ALP SERPL-CCNC: 72 U/L — SIGNIFICANT CHANGE UP (ref 40–120)
ALT FLD-CCNC: 20 U/L — SIGNIFICANT CHANGE UP (ref 10–45)
ANION GAP SERPL CALC-SCNC: 7 MMOL/L — SIGNIFICANT CHANGE UP (ref 5–17)
AST SERPL-CCNC: 25 U/L — SIGNIFICANT CHANGE UP (ref 10–40)
BILIRUB SERPL-MCNC: 0.7 MG/DL — SIGNIFICANT CHANGE UP (ref 0.2–1.2)
BUN SERPL-MCNC: 12 MG/DL — SIGNIFICANT CHANGE UP (ref 7–23)
CALCIUM SERPL-MCNC: 9.8 MG/DL — SIGNIFICANT CHANGE UP (ref 8.4–10.5)
CHLORIDE SERPL-SCNC: 107 MMOL/L — SIGNIFICANT CHANGE UP (ref 96–108)
CO2 SERPL-SCNC: 27 MMOL/L — SIGNIFICANT CHANGE UP (ref 22–31)
CREAT SERPL-MCNC: 0.6 MG/DL — SIGNIFICANT CHANGE UP (ref 0.5–1.3)
EGFR: 97 ML/MIN/1.73M2 — SIGNIFICANT CHANGE UP
GLUCOSE SERPL-MCNC: 132 MG/DL — HIGH (ref 70–99)
HCT VFR BLD CALC: 31.6 % — LOW (ref 34.5–45)
HGB BLD-MCNC: 10.4 G/DL — LOW (ref 11.5–15.5)
LYMPHOCYTES # BLD AUTO: 0.9 K/UL — LOW (ref 1–3.3)
LYMPHOCYTES # BLD AUTO: 24.3 % — SIGNIFICANT CHANGE UP (ref 13–44)
MCHC RBC-ENTMCNC: 30 PG — SIGNIFICANT CHANGE UP (ref 27–34)
MCHC RBC-ENTMCNC: 32.9 GM/DL — SIGNIFICANT CHANGE UP (ref 32–36)
MCV RBC AUTO: 91.1 FL — SIGNIFICANT CHANGE UP (ref 80–100)
NEUTROPHILS # BLD AUTO: 2.5 K/UL — SIGNIFICANT CHANGE UP (ref 1.8–7.4)
NEUTROPHILS NFR BLD AUTO: 70.8 % — SIGNIFICANT CHANGE UP (ref 43–77)
PLATELET # BLD AUTO: 236 K/UL — SIGNIFICANT CHANGE UP (ref 150–400)
POTASSIUM SERPL-MCNC: 4 MMOL/L — SIGNIFICANT CHANGE UP (ref 3.5–5.3)
POTASSIUM SERPL-SCNC: 4 MMOL/L — SIGNIFICANT CHANGE UP (ref 3.5–5.3)
PROT SERPL-MCNC: 6.7 G/DL — SIGNIFICANT CHANGE UP (ref 6–8.3)
RBC # BLD: 3.47 M/UL — LOW (ref 3.8–5.2)
RBC # FLD: 15.9 % — HIGH (ref 10.3–14.5)
SODIUM SERPL-SCNC: 141 MMOL/L — SIGNIFICANT CHANGE UP (ref 135–145)
WBC # BLD: 3.6 K/UL — LOW (ref 3.8–10.5)
WBC # FLD AUTO: 3.6 K/UL — LOW (ref 3.8–10.5)

## 2023-09-20 PROCEDURE — 96375 TX/PRO/DX INJ NEW DRUG ADDON: CPT

## 2023-09-20 PROCEDURE — 36415 COLL VENOUS BLD VENIPUNCTURE: CPT

## 2023-09-20 PROCEDURE — 85025 COMPLETE CBC W/AUTO DIFF WBC: CPT

## 2023-09-20 PROCEDURE — 80053 COMPREHEN METABOLIC PANEL: CPT

## 2023-09-20 PROCEDURE — 96413 CHEMO IV INFUSION 1 HR: CPT

## 2023-09-20 RX ORDER — PACLITAXEL 6 MG/ML
160 INJECTION, SOLUTION, CONCENTRATE INTRAVENOUS ONCE
Refills: 0 | Status: COMPLETED | OUTPATIENT
Start: 2023-09-20 | End: 2023-09-20

## 2023-09-20 RX ORDER — ONDANSETRON 8 MG/1
8 TABLET, FILM COATED ORAL ONCE
Refills: 0 | Status: COMPLETED | OUTPATIENT
Start: 2023-09-20 | End: 2023-09-20

## 2023-09-20 RX ORDER — ACETAMINOPHEN 500 MG
650 TABLET ORAL ONCE
Refills: 0 | Status: COMPLETED | OUTPATIENT
Start: 2023-09-20 | End: 2023-09-20

## 2023-09-20 RX ORDER — FAMOTIDINE 10 MG/ML
20 INJECTION INTRAVENOUS ONCE
Refills: 0 | Status: COMPLETED | OUTPATIENT
Start: 2023-09-20 | End: 2023-09-20

## 2023-09-20 RX ORDER — DEXAMETHASONE 0.5 MG/5ML
4 ELIXIR ORAL ONCE
Refills: 0 | Status: COMPLETED | OUTPATIENT
Start: 2023-09-20 | End: 2023-09-20

## 2023-09-20 RX ADMIN — PACLITAXEL 160 MILLIGRAM(S): 6 INJECTION, SOLUTION, CONCENTRATE INTRAVENOUS at 12:38

## 2023-09-20 RX ADMIN — FAMOTIDINE 20 MILLIGRAM(S): 10 INJECTION INTRAVENOUS at 11:01

## 2023-09-20 RX ADMIN — Medication 650 MILLIGRAM(S): at 10:58

## 2023-09-20 RX ADMIN — Medication 4 MILLIGRAM(S): at 11:20

## 2023-09-20 RX ADMIN — ONDANSETRON 8 MILLIGRAM(S): 8 TABLET, FILM COATED ORAL at 11:38

## 2023-09-20 RX ADMIN — Medication 204 MILLIGRAM(S): at 11:05

## 2023-09-20 RX ADMIN — FAMOTIDINE 208 MILLIGRAM(S): 10 INJECTION INTRAVENOUS at 10:46

## 2023-09-20 RX ADMIN — Medication 650 MILLIGRAM(S): at 10:46

## 2023-09-20 RX ADMIN — PACLITAXEL 160 MILLIGRAM(S): 6 INJECTION, SOLUTION, CONCENTRATE INTRAVENOUS at 11:36

## 2023-09-20 RX ADMIN — ONDANSETRON 216 MILLIGRAM(S): 8 TABLET, FILM COATED ORAL at 11:22

## 2023-09-20 RX ADMIN — Medication 300 UNIT(S): at 12:40

## 2023-09-21 LAB — TSI ACT/NOR SER: <0.1 IU/L

## 2023-09-22 LAB — TSH RECEPTOR AB: <1.1 IU/L

## 2023-09-26 ENCOUNTER — OUTPATIENT (OUTPATIENT)
Dept: OUTPATIENT SERVICES | Facility: HOSPITAL | Age: 68
LOS: 1 days | End: 2023-09-26
Payer: MEDICARE

## 2023-09-26 DIAGNOSIS — C50.911 MALIGNANT NEOPLASM OF UNSPECIFIED SITE OF RIGHT FEMALE BREAST: ICD-10-CM

## 2023-09-26 DIAGNOSIS — Z90.49 ACQUIRED ABSENCE OF OTHER SPECIFIED PARTS OF DIGESTIVE TRACT: Chronic | ICD-10-CM

## 2023-09-26 DIAGNOSIS — Z98.890 OTHER SPECIFIED POSTPROCEDURAL STATES: Chronic | ICD-10-CM

## 2023-09-26 PROCEDURE — 93356 MYOCRD STRAIN IMG SPCKL TRCK: CPT | Mod: 26

## 2023-09-26 PROCEDURE — 93306 TTE W/DOPPLER COMPLETE: CPT

## 2023-09-26 PROCEDURE — 93306 TTE W/DOPPLER COMPLETE: CPT | Mod: 26

## 2023-09-27 ENCOUNTER — APPOINTMENT (OUTPATIENT)
Dept: INFUSION THERAPY | Facility: CLINIC | Age: 68
End: 2023-09-27

## 2023-09-27 ENCOUNTER — LABORATORY RESULT (OUTPATIENT)
Age: 68
End: 2023-09-27

## 2023-09-27 ENCOUNTER — OUTPATIENT (OUTPATIENT)
Dept: OUTPATIENT SERVICES | Facility: HOSPITAL | Age: 68
LOS: 1 days | End: 2023-09-27
Payer: MEDICARE

## 2023-09-27 ENCOUNTER — APPOINTMENT (OUTPATIENT)
Dept: HEMATOLOGY ONCOLOGY | Facility: CLINIC | Age: 68
End: 2023-09-27
Payer: MEDICARE

## 2023-09-27 ENCOUNTER — NON-APPOINTMENT (OUTPATIENT)
Age: 68
End: 2023-09-27

## 2023-09-27 VITALS
HEIGHT: 66 IN | HEART RATE: 86 BPM | RESPIRATION RATE: 18 BRPM | OXYGEN SATURATION: 97 % | TEMPERATURE: 98 F | WEIGHT: 186.07 LBS | DIASTOLIC BLOOD PRESSURE: 79 MMHG | SYSTOLIC BLOOD PRESSURE: 116 MMHG

## 2023-09-27 VITALS
WEIGHT: 185 LBS | SYSTOLIC BLOOD PRESSURE: 116 MMHG | RESPIRATION RATE: 18 BRPM | HEART RATE: 86 BPM | DIASTOLIC BLOOD PRESSURE: 79 MMHG | HEIGHT: 66 IN | OXYGEN SATURATION: 97 % | TEMPERATURE: 98.1 F | BODY MASS INDEX: 29.73 KG/M2

## 2023-09-27 DIAGNOSIS — Z90.49 ACQUIRED ABSENCE OF OTHER SPECIFIED PARTS OF DIGESTIVE TRACT: Chronic | ICD-10-CM

## 2023-09-27 DIAGNOSIS — R19.7 DIARRHEA, UNSPECIFIED: ICD-10-CM

## 2023-09-27 DIAGNOSIS — C50.912 MALIGNANT NEOPLASM OF UNSPECIFIED SITE OF LEFT FEMALE BREAST: ICD-10-CM

## 2023-09-27 PROCEDURE — 96401 CHEMO ANTI-NEOPL SQ/IM: CPT

## 2023-09-27 PROCEDURE — 36415 COLL VENOUS BLD VENIPUNCTURE: CPT

## 2023-09-27 PROCEDURE — 99214 OFFICE O/P EST MOD 30 MIN: CPT | Mod: 25

## 2023-09-27 RX ORDER — PERTUZUMAB, TRASTUZUMAB, AND HYALURONIDASE-ZZXF 600; 600; 2000 MG/10ML; MG/10ML; U/10ML
10 INJECTION, SOLUTION SUBCUTANEOUS ONCE
Refills: 0 | Status: COMPLETED | OUTPATIENT
Start: 2023-09-27 | End: 2023-09-27

## 2023-09-27 RX ORDER — ONDANSETRON 8 MG/1
8 TABLET, FILM COATED ORAL ONCE
Refills: 0 | Status: DISCONTINUED | OUTPATIENT
Start: 2023-09-27 | End: 2023-09-27

## 2023-09-27 RX ORDER — DEXAMETHASONE 0.5 MG/5ML
4 ELIXIR ORAL ONCE
Refills: 0 | Status: DISCONTINUED | OUTPATIENT
Start: 2023-09-27 | End: 2023-09-27

## 2023-09-27 RX ORDER — PACLITAXEL 6 MG/ML
160 INJECTION, SOLUTION, CONCENTRATE INTRAVENOUS ONCE
Refills: 0 | Status: DISCONTINUED | OUTPATIENT
Start: 2023-09-27 | End: 2023-09-27

## 2023-09-27 RX ORDER — ACETAMINOPHEN 500 MG
650 TABLET ORAL ONCE
Refills: 0 | Status: DISCONTINUED | OUTPATIENT
Start: 2023-09-27 | End: 2023-09-27

## 2023-09-27 RX ADMIN — PERTUZUMAB, TRASTUZUMAB, AND HYALURONIDASE-ZZXF 10 MILLILITER(S): 600; 600; 2000 INJECTION, SOLUTION SUBCUTANEOUS at 14:55

## 2023-09-29 LAB
ALBUMIN SERPL ELPH-MCNC: 3.9 G/DL
ALP BLD-CCNC: 73 U/L
ALT SERPL-CCNC: 16 U/L
ANION GAP SERPL CALC-SCNC: 11 MMOL/L
AST SERPL-CCNC: 24 U/L
BILIRUB SERPL-MCNC: 0.7 MG/DL
BUN SERPL-MCNC: 13 MG/DL
CALCIUM SERPL-MCNC: 9.5 MG/DL
CHLORIDE SERPL-SCNC: 108 MMOL/L
CO2 SERPL-SCNC: 23 MMOL/L
CREAT SERPL-MCNC: 0.7 MG/DL
EGFR: 94 ML/MIN/1.73M2
GLUCOSE SERPL-MCNC: 96 MG/DL
POTASSIUM SERPL-SCNC: 4 MMOL/L
PROT SERPL-MCNC: 7.1 G/DL
SODIUM SERPL-SCNC: 142 MMOL/L

## 2023-10-04 ENCOUNTER — NON-APPOINTMENT (OUTPATIENT)
Age: 68
End: 2023-10-04

## 2023-10-05 ENCOUNTER — OUTPATIENT (OUTPATIENT)
Dept: OUTPATIENT SERVICES | Facility: HOSPITAL | Age: 68
LOS: 1 days | End: 2023-10-05
Payer: MEDICARE

## 2023-10-05 ENCOUNTER — APPOINTMENT (OUTPATIENT)
Dept: MRI IMAGING | Facility: HOSPITAL | Age: 68
End: 2023-10-05

## 2023-10-05 DIAGNOSIS — Z90.89 ACQUIRED ABSENCE OF OTHER ORGANS: Chronic | ICD-10-CM

## 2023-10-05 DIAGNOSIS — Z90.49 ACQUIRED ABSENCE OF OTHER SPECIFIED PARTS OF DIGESTIVE TRACT: Chronic | ICD-10-CM

## 2023-10-05 DIAGNOSIS — Z98.890 OTHER SPECIFIED POSTPROCEDURAL STATES: Chronic | ICD-10-CM

## 2023-10-05 PROCEDURE — 77049 MRI BREAST C-+ W/CAD BI: CPT | Mod: 26

## 2023-10-05 PROCEDURE — A9585: CPT

## 2023-10-05 PROCEDURE — C8908: CPT

## 2023-10-05 PROCEDURE — C8937: CPT

## 2023-10-09 ENCOUNTER — APPOINTMENT (OUTPATIENT)
Dept: HEART AND VASCULAR | Facility: CLINIC | Age: 68
End: 2023-10-09
Payer: MEDICARE

## 2023-10-09 VITALS
WEIGHT: 183 LBS | DIASTOLIC BLOOD PRESSURE: 78 MMHG | HEIGHT: 66 IN | TEMPERATURE: 97 F | HEART RATE: 106 BPM | OXYGEN SATURATION: 98 % | BODY MASS INDEX: 29.41 KG/M2 | SYSTOLIC BLOOD PRESSURE: 120 MMHG

## 2023-10-09 DIAGNOSIS — Z13.220 ENCOUNTER FOR SCREENING FOR LIPOID DISORDERS: ICD-10-CM

## 2023-10-09 DIAGNOSIS — I25.10 ATHEROSCLEROTIC HEART DISEASE OF NATIVE CORONARY ARTERY W/OUT ANGINA PECTORIS: ICD-10-CM

## 2023-10-09 DIAGNOSIS — R73.09 OTHER ABNORMAL GLUCOSE: ICD-10-CM

## 2023-10-09 PROCEDURE — 99205 OFFICE O/P NEW HI 60 MIN: CPT | Mod: 25

## 2023-10-09 PROCEDURE — 93000 ELECTROCARDIOGRAM COMPLETE: CPT

## 2023-10-10 ENCOUNTER — NON-APPOINTMENT (OUTPATIENT)
Age: 68
End: 2023-10-10

## 2023-10-10 LAB
CHOLEST SERPL-MCNC: 135 MG/DL
ESTIMATED AVERAGE GLUCOSE: 100 MG/DL
HBA1C MFR BLD HPLC: 5.1 %
HDLC SERPL-MCNC: 39 MG/DL
LDLC SERPL CALC-MCNC: 72 MG/DL
NONHDLC SERPL-MCNC: 96 MG/DL
TRIGL SERPL-MCNC: 137 MG/DL

## 2023-10-12 LAB — APO LP(A) SERPL-MCNC: <9 NMOL/L

## 2023-10-16 ENCOUNTER — APPOINTMENT (OUTPATIENT)
Dept: HEMATOLOGY ONCOLOGY | Facility: CLINIC | Age: 68
End: 2023-10-16
Payer: MEDICARE

## 2023-10-16 ENCOUNTER — APPOINTMENT (OUTPATIENT)
Dept: BREAST CENTER | Facility: CLINIC | Age: 68
End: 2023-10-16
Payer: MEDICARE

## 2023-10-16 VITALS
TEMPERATURE: 97.4 F | HEART RATE: 94 BPM | DIASTOLIC BLOOD PRESSURE: 86 MMHG | WEIGHT: 184 LBS | SYSTOLIC BLOOD PRESSURE: 146 MMHG | OXYGEN SATURATION: 97 % | RESPIRATION RATE: 18 BRPM | HEIGHT: 66 IN | BODY MASS INDEX: 29.57 KG/M2

## 2023-10-16 VITALS
BODY MASS INDEX: 29.57 KG/M2 | DIASTOLIC BLOOD PRESSURE: 86 MMHG | WEIGHT: 184 LBS | HEART RATE: 94 BPM | HEIGHT: 66 IN | SYSTOLIC BLOOD PRESSURE: 146 MMHG

## 2023-10-16 PROCEDURE — 99215 OFFICE O/P EST HI 40 MIN: CPT

## 2023-10-16 PROCEDURE — 99214 OFFICE O/P EST MOD 30 MIN: CPT

## 2023-10-18 ENCOUNTER — APPOINTMENT (OUTPATIENT)
Dept: RADIATION ONCOLOGY | Facility: CLINIC | Age: 68
End: 2023-10-18
Payer: MEDICARE

## 2023-10-18 VITALS
TEMPERATURE: 98.6 F | HEIGHT: 66 IN | WEIGHT: 183.5 LBS | OXYGEN SATURATION: 98 % | SYSTOLIC BLOOD PRESSURE: 128 MMHG | HEART RATE: 84 BPM | DIASTOLIC BLOOD PRESSURE: 85 MMHG | RESPIRATION RATE: 18 BRPM | BODY MASS INDEX: 29.49 KG/M2

## 2023-10-18 PROCEDURE — 99205 OFFICE O/P NEW HI 60 MIN: CPT

## 2023-10-19 ENCOUNTER — APPOINTMENT (OUTPATIENT)
Dept: PLASTIC SURGERY | Facility: CLINIC | Age: 68
End: 2023-10-19
Payer: MEDICARE

## 2023-10-19 VITALS — HEIGHT: 66 IN | OXYGEN SATURATION: 98 % | HEART RATE: 85 BPM | BODY MASS INDEX: 29.41 KG/M2 | WEIGHT: 183 LBS

## 2023-10-19 DIAGNOSIS — Z87.09 PERSONAL HISTORY OF OTHER DISEASES OF THE RESPIRATORY SYSTEM: ICD-10-CM

## 2023-10-19 PROCEDURE — 99204 OFFICE O/P NEW MOD 45 MIN: CPT

## 2023-10-23 ENCOUNTER — APPOINTMENT (OUTPATIENT)
Dept: HEMATOLOGY ONCOLOGY | Facility: CLINIC | Age: 68
End: 2023-10-23
Payer: MEDICARE

## 2023-10-23 PROCEDURE — 90834 PSYTX W PT 45 MINUTES: CPT | Mod: 95

## 2023-10-24 ENCOUNTER — APPOINTMENT (OUTPATIENT)
Dept: BREAST CENTER | Facility: CLINIC | Age: 68
End: 2023-10-24
Payer: MEDICARE

## 2023-10-24 VITALS
HEART RATE: 81 BPM | DIASTOLIC BLOOD PRESSURE: 79 MMHG | HEIGHT: 66 IN | WEIGHT: 181 LBS | OXYGEN SATURATION: 98 % | SYSTOLIC BLOOD PRESSURE: 137 MMHG | BODY MASS INDEX: 29.09 KG/M2

## 2023-10-24 PROCEDURE — 99215 OFFICE O/P EST HI 40 MIN: CPT

## 2023-10-26 ENCOUNTER — APPOINTMENT (OUTPATIENT)
Dept: PLASTIC SURGERY | Facility: CLINIC | Age: 68
End: 2023-10-26
Payer: MEDICARE

## 2023-10-26 VITALS — HEART RATE: 91 BPM | HEIGHT: 66 IN | BODY MASS INDEX: 29.09 KG/M2 | WEIGHT: 181 LBS | OXYGEN SATURATION: 96 %

## 2023-10-26 PROCEDURE — 99213 OFFICE O/P EST LOW 20 MIN: CPT

## 2023-10-30 ENCOUNTER — APPOINTMENT (OUTPATIENT)
Dept: HEMATOLOGY ONCOLOGY | Facility: CLINIC | Age: 68
End: 2023-10-30
Payer: MEDICARE

## 2023-10-30 PROCEDURE — 90834 PSYTX W PT 45 MINUTES: CPT | Mod: 95

## 2023-11-06 ENCOUNTER — APPOINTMENT (OUTPATIENT)
Dept: HEMATOLOGY ONCOLOGY | Facility: CLINIC | Age: 68
End: 2023-11-06
Payer: MEDICARE

## 2023-11-06 PROCEDURE — 90834 PSYTX W PT 45 MINUTES: CPT | Mod: 95

## 2023-11-07 ENCOUNTER — NON-APPOINTMENT (OUTPATIENT)
Age: 68
End: 2023-11-07

## 2023-11-13 ENCOUNTER — APPOINTMENT (OUTPATIENT)
Dept: HEMATOLOGY ONCOLOGY | Facility: CLINIC | Age: 68
End: 2023-11-13
Payer: MEDICARE

## 2023-11-13 PROCEDURE — 90834 PSYTX W PT 45 MINUTES: CPT | Mod: 95

## 2023-11-15 ENCOUNTER — NON-APPOINTMENT (OUTPATIENT)
Age: 68
End: 2023-11-15

## 2023-11-27 ENCOUNTER — APPOINTMENT (OUTPATIENT)
Dept: HEMATOLOGY ONCOLOGY | Facility: CLINIC | Age: 68
End: 2023-11-27
Payer: MEDICARE

## 2023-11-27 PROCEDURE — 90834 PSYTX W PT 45 MINUTES: CPT | Mod: 95

## 2023-11-29 ENCOUNTER — APPOINTMENT (OUTPATIENT)
Dept: PLASTIC SURGERY | Facility: CLINIC | Age: 68
End: 2023-11-29
Payer: MEDICARE

## 2023-11-29 PROCEDURE — ZZZZZ: CPT

## 2023-11-29 PROCEDURE — 99214 OFFICE O/P EST MOD 30 MIN: CPT

## 2023-12-04 ENCOUNTER — TRANSCRIPTION ENCOUNTER (OUTPATIENT)
Age: 68
End: 2023-12-04

## 2023-12-04 ENCOUNTER — OUTPATIENT (OUTPATIENT)
Dept: OUTPATIENT SERVICES | Facility: HOSPITAL | Age: 68
LOS: 1 days | End: 2023-12-04
Payer: MEDICARE

## 2023-12-04 ENCOUNTER — APPOINTMENT (OUTPATIENT)
Dept: NUCLEAR MEDICINE | Facility: HOSPITAL | Age: 68
End: 2023-12-04

## 2023-12-04 VITALS
SYSTOLIC BLOOD PRESSURE: 123 MMHG | HEART RATE: 76 BPM | RESPIRATION RATE: 16 BRPM | OXYGEN SATURATION: 96 % | DIASTOLIC BLOOD PRESSURE: 75 MMHG | TEMPERATURE: 98 F | WEIGHT: 180.78 LBS | HEIGHT: 65 IN

## 2023-12-04 DIAGNOSIS — Z90.89 ACQUIRED ABSENCE OF OTHER ORGANS: Chronic | ICD-10-CM

## 2023-12-04 DIAGNOSIS — Z96.649 PRESENCE OF UNSPECIFIED ARTIFICIAL HIP JOINT: Chronic | ICD-10-CM

## 2023-12-04 DIAGNOSIS — Z90.49 ACQUIRED ABSENCE OF OTHER SPECIFIED PARTS OF DIGESTIVE TRACT: Chronic | ICD-10-CM

## 2023-12-04 DIAGNOSIS — Z98.890 OTHER SPECIFIED POSTPROCEDURAL STATES: Chronic | ICD-10-CM

## 2023-12-04 PROCEDURE — 78195 LYMPH SYSTEM IMAGING: CPT | Mod: 26

## 2023-12-04 PROCEDURE — 78195 LYMPH SYSTEM IMAGING: CPT

## 2023-12-04 PROCEDURE — A9541: CPT

## 2023-12-04 NOTE — ASU PATIENT PROFILE, ADULT - NSICDXPASTSURGICALHX_GEN_ALL_CORE_FT
PAST SURGICAL HISTORY:  History of bunionectomy of left great toe     History of cholecystectomy     History of hip replacement     History of lumbar surgery L3-4,    History of tonsillectomy      PAST SURGICAL HISTORY:  History of bunionectomy of left great toe     History of cholecystectomy     History of hip replacement left    History of lumbar surgery L3-4,    History of tonsillectomy

## 2023-12-04 NOTE — ASU PATIENT PROFILE, ADULT - FALL HARM RISK - UNIVERSAL INTERVENTIONS
Bed in lowest position, wheels locked, appropriate side rails in place/Call bell, personal items and telephone in reach/Instruct patient to call for assistance before getting out of bed or chair/Non-slip footwear when patient is out of bed/[place] to call system/Physically safe environment - no spills, clutter or unnecessary equipment/Purposeful Proactive Rounding/Room/bathroom lighting operational, light cord in reach Bed in lowest position, wheels locked, appropriate side rails in place/Call bell, personal items and telephone in reach/Instruct patient to call for assistance before getting out of bed or chair/Non-slip footwear when patient is out of bed/[place] to call system/Physically safe environment - no spills, clutter or unnecessary equipment/Purposeful Proactive Rounding/Room/bathroom lighting operational, light cord in reach

## 2023-12-04 NOTE — ASU PATIENT PROFILE, ADULT - NSICDXPASTMEDICALHX_GEN_ALL_CORE_FT
PAST MEDICAL HISTORY:  Asthma inhalator stopped since one yr ago     PAST MEDICAL HISTORY:  Arthritis     Asthma inhalator stopped since one yr ago     PAST MEDICAL HISTORY:  Arthritis     Asthma inhalator stopped since one yr ago    Breast cancer

## 2023-12-04 NOTE — ASU PATIENT PROFILE, ADULT - NS PRO AD PATIENT TYPE
christa Romero, son, 34yrs old, [phone]/Health Care Proxy (HCP) christa Romero, son, 34yrs old, [phone]/Health Care Proxy (HCP)

## 2023-12-04 NOTE — ASU PATIENT PROFILE, ADULT - REASON FOR ADMISSION, PROFILE
Bilateral placement tissue expander with alloderm Bilateral Mastectomy ;Bilateral placement tissue expander with alloderm

## 2023-12-05 ENCOUNTER — TRANSCRIPTION ENCOUNTER (OUTPATIENT)
Age: 68
End: 2023-12-05

## 2023-12-05 ENCOUNTER — RESULT REVIEW (OUTPATIENT)
Age: 68
End: 2023-12-05

## 2023-12-05 ENCOUNTER — INPATIENT (INPATIENT)
Facility: HOSPITAL | Age: 68
LOS: 2 days | Discharge: HOME | DRG: 580 | End: 2023-12-08
Attending: STUDENT IN AN ORGANIZED HEALTH CARE EDUCATION/TRAINING PROGRAM | Admitting: STUDENT IN AN ORGANIZED HEALTH CARE EDUCATION/TRAINING PROGRAM
Payer: MEDICARE

## 2023-12-05 ENCOUNTER — APPOINTMENT (OUTPATIENT)
Dept: BREAST CENTER | Facility: HOSPITAL | Age: 68
End: 2023-12-05
Payer: MEDICARE

## 2023-12-05 DIAGNOSIS — Z98.890 OTHER SPECIFIED POSTPROCEDURAL STATES: Chronic | ICD-10-CM

## 2023-12-05 DIAGNOSIS — Z96.649 PRESENCE OF UNSPECIFIED ARTIFICIAL HIP JOINT: Chronic | ICD-10-CM

## 2023-12-05 DIAGNOSIS — Z96.642 PRESENCE OF LEFT ARTIFICIAL HIP JOINT: ICD-10-CM

## 2023-12-05 DIAGNOSIS — Z88.8 ALLERGY STATUS TO OTHER DRUGS, MEDICAMENTS AND BIOLOGICAL SUBSTANCES: ICD-10-CM

## 2023-12-05 DIAGNOSIS — C50.911 MALIGNANT NEOPLASM OF UNSPECIFIED SITE OF RIGHT FEMALE BREAST: ICD-10-CM

## 2023-12-05 DIAGNOSIS — Z90.49 ACQUIRED ABSENCE OF OTHER SPECIFIED PARTS OF DIGESTIVE TRACT: Chronic | ICD-10-CM

## 2023-12-05 DIAGNOSIS — Z90.49 ACQUIRED ABSENCE OF OTHER SPECIFIED PARTS OF DIGESTIVE TRACT: ICD-10-CM

## 2023-12-05 DIAGNOSIS — Z90.89 ACQUIRED ABSENCE OF OTHER ORGANS: Chronic | ICD-10-CM

## 2023-12-05 DIAGNOSIS — J45.909 UNSPECIFIED ASTHMA, UNCOMPLICATED: ICD-10-CM

## 2023-12-05 DIAGNOSIS — D05.92 UNSPECIFIED TYPE OF CARCINOMA IN SITU OF LEFT BREAST: ICD-10-CM

## 2023-12-05 DIAGNOSIS — Z82.49 FAMILY HISTORY OF ISCHEMIC HEART DISEASE AND OTHER DISEASES OF THE CIRCULATORY SYSTEM: ICD-10-CM
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
## 2023-12-05 DIAGNOSIS — Z17.0 ESTROGEN RECEPTOR POSITIVE STATUS [ER+]: ICD-10-CM
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
## 2023-12-05 DIAGNOSIS — L76.31 POSTPROCEDURAL HEMATOMA OF SKIN AND SUBCUTANEOUS TISSUE FOLLOWING A DERMATOLOGIC PROCEDURE: ICD-10-CM

## 2023-12-05 LAB
ANION GAP SERPL CALC-SCNC: 10 MMOL/L — SIGNIFICANT CHANGE UP (ref 5–17)
ANION GAP SERPL CALC-SCNC: 10 MMOL/L — SIGNIFICANT CHANGE UP (ref 5–17)
APTT BLD: 25.3 SEC — SIGNIFICANT CHANGE UP (ref 24.5–35.6)
APTT BLD: 25.3 SEC — SIGNIFICANT CHANGE UP (ref 24.5–35.6)
BLD GP AB SCN SERPL QL: NEGATIVE — SIGNIFICANT CHANGE UP
BUN SERPL-MCNC: 16 MG/DL — SIGNIFICANT CHANGE UP (ref 7–23)
BUN SERPL-MCNC: 16 MG/DL — SIGNIFICANT CHANGE UP (ref 7–23)
CALCIUM SERPL-MCNC: 8.7 MG/DL — SIGNIFICANT CHANGE UP (ref 8.4–10.5)
CALCIUM SERPL-MCNC: 8.7 MG/DL — SIGNIFICANT CHANGE UP (ref 8.4–10.5)
CHLORIDE SERPL-SCNC: 104 MMOL/L — SIGNIFICANT CHANGE UP (ref 96–108)
CHLORIDE SERPL-SCNC: 104 MMOL/L — SIGNIFICANT CHANGE UP (ref 96–108)
CO2 SERPL-SCNC: 24 MMOL/L — SIGNIFICANT CHANGE UP (ref 22–31)
CO2 SERPL-SCNC: 24 MMOL/L — SIGNIFICANT CHANGE UP (ref 22–31)
CREAT SERPL-MCNC: 0.91 MG/DL — SIGNIFICANT CHANGE UP (ref 0.5–1.3)
CREAT SERPL-MCNC: 0.91 MG/DL — SIGNIFICANT CHANGE UP (ref 0.5–1.3)
EGFR: 68 ML/MIN/1.73M2 — SIGNIFICANT CHANGE UP
EGFR: 68 ML/MIN/1.73M2 — SIGNIFICANT CHANGE UP
GLUCOSE SERPL-MCNC: 224 MG/DL — HIGH (ref 70–99)
GLUCOSE SERPL-MCNC: 224 MG/DL — HIGH (ref 70–99)
HCT VFR BLD CALC: 30.6 % — LOW (ref 34.5–45)
HCT VFR BLD CALC: 30.6 % — LOW (ref 34.5–45)
HCT VFR BLD CALC: 31.5 % — LOW (ref 34.5–45)
HCT VFR BLD CALC: 31.5 % — LOW (ref 34.5–45)
HGB BLD-MCNC: 10.2 G/DL — LOW (ref 11.5–15.5)
HGB BLD-MCNC: 10.2 G/DL — LOW (ref 11.5–15.5)
HGB BLD-MCNC: 10.5 G/DL — LOW (ref 11.5–15.5)
HGB BLD-MCNC: 10.5 G/DL — LOW (ref 11.5–15.5)
INR BLD: 1.08 — SIGNIFICANT CHANGE UP (ref 0.85–1.18)
INR BLD: 1.08 — SIGNIFICANT CHANGE UP (ref 0.85–1.18)
LACTATE SERPL-SCNC: 2.8 MMOL/L — HIGH (ref 0.5–2)
LACTATE SERPL-SCNC: 2.8 MMOL/L — HIGH (ref 0.5–2)
LACTATE SERPL-SCNC: 3.9 MMOL/L — HIGH (ref 0.5–2)
LACTATE SERPL-SCNC: 3.9 MMOL/L — HIGH (ref 0.5–2)
MAGNESIUM SERPL-MCNC: 1.7 MG/DL — SIGNIFICANT CHANGE UP (ref 1.6–2.6)
MAGNESIUM SERPL-MCNC: 1.7 MG/DL — SIGNIFICANT CHANGE UP (ref 1.6–2.6)
MCHC RBC-ENTMCNC: 29.2 PG — SIGNIFICANT CHANGE UP (ref 27–34)
MCHC RBC-ENTMCNC: 29.2 PG — SIGNIFICANT CHANGE UP (ref 27–34)
MCHC RBC-ENTMCNC: 29.4 PG — SIGNIFICANT CHANGE UP (ref 27–34)
MCHC RBC-ENTMCNC: 29.4 PG — SIGNIFICANT CHANGE UP (ref 27–34)
MCHC RBC-ENTMCNC: 33.3 GM/DL — SIGNIFICANT CHANGE UP (ref 32–36)
MCV RBC AUTO: 87.7 FL — SIGNIFICANT CHANGE UP (ref 80–100)
MCV RBC AUTO: 87.7 FL — SIGNIFICANT CHANGE UP (ref 80–100)
MCV RBC AUTO: 88.2 FL — SIGNIFICANT CHANGE UP (ref 80–100)
MCV RBC AUTO: 88.2 FL — SIGNIFICANT CHANGE UP (ref 80–100)
NRBC # BLD: 0 /100 WBCS — SIGNIFICANT CHANGE UP (ref 0–0)
PHOSPHATE SERPL-MCNC: 4.3 MG/DL — SIGNIFICANT CHANGE UP (ref 2.5–4.5)
PHOSPHATE SERPL-MCNC: 4.3 MG/DL — SIGNIFICANT CHANGE UP (ref 2.5–4.5)
PLATELET # BLD AUTO: 222 K/UL — SIGNIFICANT CHANGE UP (ref 150–400)
PLATELET # BLD AUTO: 222 K/UL — SIGNIFICANT CHANGE UP (ref 150–400)
PLATELET # BLD AUTO: 262 K/UL — SIGNIFICANT CHANGE UP (ref 150–400)
PLATELET # BLD AUTO: 262 K/UL — SIGNIFICANT CHANGE UP (ref 150–400)
POTASSIUM SERPL-MCNC: 4.5 MMOL/L — SIGNIFICANT CHANGE UP (ref 3.5–5.3)
POTASSIUM SERPL-MCNC: 4.5 MMOL/L — SIGNIFICANT CHANGE UP (ref 3.5–5.3)
POTASSIUM SERPL-SCNC: 4.5 MMOL/L — SIGNIFICANT CHANGE UP (ref 3.5–5.3)
POTASSIUM SERPL-SCNC: 4.5 MMOL/L — SIGNIFICANT CHANGE UP (ref 3.5–5.3)
PROTHROM AB SERPL-ACNC: 12.3 SEC — SIGNIFICANT CHANGE UP (ref 9.5–13)
PROTHROM AB SERPL-ACNC: 12.3 SEC — SIGNIFICANT CHANGE UP (ref 9.5–13)
RBC # BLD: 3.47 M/UL — LOW (ref 3.8–5.2)
RBC # BLD: 3.47 M/UL — LOW (ref 3.8–5.2)
RBC # BLD: 3.59 M/UL — LOW (ref 3.8–5.2)
RBC # BLD: 3.59 M/UL — LOW (ref 3.8–5.2)
RBC # FLD: 11.9 % — SIGNIFICANT CHANGE UP (ref 10.3–14.5)
RH IG SCN BLD-IMP: NEGATIVE — SIGNIFICANT CHANGE UP
SODIUM SERPL-SCNC: 138 MMOL/L — SIGNIFICANT CHANGE UP (ref 135–145)
SODIUM SERPL-SCNC: 138 MMOL/L — SIGNIFICANT CHANGE UP (ref 135–145)
WBC # BLD: 12.86 K/UL — HIGH (ref 3.8–10.5)
WBC # BLD: 12.86 K/UL — HIGH (ref 3.8–10.5)
WBC # BLD: 14.33 K/UL — HIGH (ref 3.8–10.5)
WBC # BLD: 14.33 K/UL — HIGH (ref 3.8–10.5)
WBC # FLD AUTO: 12.86 K/UL — HIGH (ref 3.8–10.5)
WBC # FLD AUTO: 12.86 K/UL — HIGH (ref 3.8–10.5)
WBC # FLD AUTO: 14.33 K/UL — HIGH (ref 3.8–10.5)
WBC # FLD AUTO: 14.33 K/UL — HIGH (ref 3.8–10.5)

## 2023-12-05 PROCEDURE — 19357 TISS XPNDR PLMT BRST RCNSTJ: CPT | Mod: 50

## 2023-12-05 PROCEDURE — 76098 X-RAY EXAM SURGICAL SPECIMEN: CPT | Mod: 26

## 2023-12-05 PROCEDURE — 38790 INJECT FOR LYMPHATIC X-RAY: CPT | Mod: LT

## 2023-12-05 PROCEDURE — 38525 BIOPSY/REMOVAL LYMPH NODES: CPT | Mod: RT

## 2023-12-05 PROCEDURE — 88305 TISSUE EXAM BY PATHOLOGIST: CPT | Mod: 26

## 2023-12-05 PROCEDURE — 15777 ACELLULAR DERM MATRIX IMPLT: CPT | Mod: 50

## 2023-12-05 PROCEDURE — 19303 MAST SIMPLE COMPLETE: CPT | Mod: 50

## 2023-12-05 PROCEDURE — 88309 TISSUE EXAM BY PATHOLOGIST: CPT | Mod: 26

## 2023-12-05 PROCEDURE — 88307 TISSUE EXAM BY PATHOLOGIST: CPT | Mod: 26

## 2023-12-05 PROCEDURE — 38900 IO MAP OF SENT LYMPH NODE: CPT | Mod: RT

## 2023-12-05 DEVICE — CLIP APPLIER ETHICON LIGACLIP 11.5" MEDIUM: Type: IMPLANTABLE DEVICE | Status: FUNCTIONAL

## 2023-12-05 DEVICE — XPND TISSUE BREAST SMOOTH MOD VAR PROJ 500CC: Type: IMPLANTABLE DEVICE | Status: FUNCTIONAL

## 2023-12-05 RX ORDER — ACETAMINOPHEN 500 MG
1000 TABLET ORAL ONCE
Refills: 0 | Status: COMPLETED | OUTPATIENT
Start: 2023-12-05 | End: 2023-12-05

## 2023-12-05 RX ORDER — ACETAMINOPHEN 500 MG
1000 TABLET ORAL EVERY 6 HOURS
Refills: 0 | Status: DISCONTINUED | OUTPATIENT
Start: 2023-12-05 | End: 2023-12-05

## 2023-12-05 RX ORDER — SODIUM CHLORIDE 9 MG/ML
1000 INJECTION, SOLUTION INTRAVENOUS ONCE
Refills: 0 | Status: COMPLETED | OUTPATIENT
Start: 2023-12-05 | End: 2023-12-05

## 2023-12-05 RX ORDER — OXYCODONE AND ACETAMINOPHEN 5; 325 MG/1; MG/1
1 TABLET ORAL EVERY 6 HOURS
Refills: 0 | Status: DISCONTINUED | OUTPATIENT
Start: 2023-12-05 | End: 2023-12-05

## 2023-12-05 RX ORDER — CEFAZOLIN SODIUM 1 G
500 VIAL (EA) INJECTION EVERY 8 HOURS
Refills: 0 | Status: DISCONTINUED | OUTPATIENT
Start: 2023-12-05 | End: 2023-12-05

## 2023-12-05 RX ORDER — CEFAZOLIN SODIUM 1 G
1000 VIAL (EA) INJECTION EVERY 8 HOURS
Refills: 0 | Status: COMPLETED | OUTPATIENT
Start: 2023-12-05 | End: 2023-12-06

## 2023-12-05 RX ORDER — DIAZEPAM 5 MG
2 TABLET ORAL EVERY 8 HOURS
Refills: 0 | Status: DISCONTINUED | OUTPATIENT
Start: 2023-12-05 | End: 2023-12-07

## 2023-12-05 RX ORDER — OXYCODONE HYDROCHLORIDE 5 MG/1
2.5 TABLET ORAL EVERY 6 HOURS
Refills: 0 | Status: DISCONTINUED | OUTPATIENT
Start: 2023-12-05 | End: 2023-12-08

## 2023-12-05 RX ORDER — SODIUM CHLORIDE 9 MG/ML
1000 INJECTION, SOLUTION INTRAVENOUS
Refills: 0 | Status: DISCONTINUED | OUTPATIENT
Start: 2023-12-05 | End: 2023-12-06

## 2023-12-05 RX ORDER — ONDANSETRON 8 MG/1
4 TABLET, FILM COATED ORAL EVERY 6 HOURS
Refills: 0 | Status: DISCONTINUED | OUTPATIENT
Start: 2023-12-05 | End: 2023-12-08

## 2023-12-05 RX ORDER — OXYCODONE HYDROCHLORIDE 5 MG/1
5 TABLET ORAL EVERY 6 HOURS
Refills: 0 | Status: DISCONTINUED | OUTPATIENT
Start: 2023-12-05 | End: 2023-12-08

## 2023-12-05 RX ORDER — ONDANSETRON 8 MG/1
4 TABLET, FILM COATED ORAL ONCE
Refills: 0 | Status: COMPLETED | OUTPATIENT
Start: 2023-12-05 | End: 2023-12-05

## 2023-12-05 RX ADMIN — OXYCODONE HYDROCHLORIDE 2.5 MILLIGRAM(S): 5 TABLET ORAL at 18:31

## 2023-12-05 RX ADMIN — ONDANSETRON 4 MILLIGRAM(S): 8 TABLET, FILM COATED ORAL at 19:03

## 2023-12-05 RX ADMIN — ONDANSETRON 4 MILLIGRAM(S): 8 TABLET, FILM COATED ORAL at 22:56

## 2023-12-05 RX ADMIN — SODIUM CHLORIDE 1000 MILLILITER(S): 9 INJECTION, SOLUTION INTRAVENOUS at 22:56

## 2023-12-05 RX ADMIN — OXYCODONE HYDROCHLORIDE 2.5 MILLIGRAM(S): 5 TABLET ORAL at 17:23

## 2023-12-05 RX ADMIN — Medication 100 MILLIGRAM(S): at 17:16

## 2023-12-05 RX ADMIN — Medication 400 MILLIGRAM(S): at 14:52

## 2023-12-05 NOTE — BRIEF OPERATIVE NOTE - OPERATION/FINDINGS
The patient was brought to the operating room and placed in the supine position. An incision was planned in an elliptical fashion above the nipple-areolar complex. Once this was drawn on the skin with a sterile skin marker, an incision was made and extended through the remainder of the dermis using electrocautery, maintaining hemostasis as we progressed. The skin flaps were then raised superiorly, medially, inferiorly and laterally within the plane that divides the adipose that invest the skin with the adipose that invest the breast. The skin flaps were then raised and the dissection taken down to the chest wall in the superior, medial and inferior positions. The lateral fascia was then carefully incised, and the breast was carefully taken off the axilla superficially. The wound was then irrigated and inspected again. Rest of case per plastics.    Clinical Anatomic Stage IIA (cT2(m)N0M0) RIGHT breast multicentric ER-/NC-/Her2+ IDC with concurrent Stage 0 ([id number]) LEFT breast ER+/NC indeterminate DCIS The patient was brought to the operating room and placed in the supine position. An incision was planned in an elliptical fashion above the nipple-areolar complex. Once this was drawn on the skin with a sterile skin marker, an incision was made and extended through the remainder of the dermis using electrocautery, maintaining hemostasis as we progressed. The skin flaps were then raised superiorly, medially, inferiorly and laterally within the plane that divides the adipose that invest the skin with the adipose that invest the breast. The skin flaps were then raised and the dissection taken down to the chest wall in the superior, medial and inferior positions. The lateral fascia was then carefully incised, and the breast was carefully taken off the axilla superficially. The wound was then irrigated and inspected again. Rest of case per plastics.    Clinical Anatomic Stage IIA (cT2(m)N0M0) RIGHT breast multicentric ER-/ME-/Her2+ IDC with concurrent Stage 0 ([id number]) LEFT breast ER+/ME indeterminate DCIS

## 2023-12-05 NOTE — PROGRESS NOTE ADULT - SUBJECTIVE AND OBJECTIVE BOX
General Surgery Post op Check    Pt seen and examined without complaints. Pain is controlled. Denies SOB/CP/N/V.     Vital Signs Last 24 Hrs  T(C): 36.3 (05 Dec 2023 13:08), Max: 36.7 (05 Dec 2023 07:34)  T(F): 97.3 (05 Dec 2023 13:08), Max: 97.3 (05 Dec 2023 13:08)  HR: 82 (05 Dec 2023 16:08) (76 - 90)  BP: 143/77 (05 Dec 2023 16:08) (123/75 - 150/87)  BP(mean): 103 (05 Dec 2023 16:08) (101 - 112)  RR: 14 (05 Dec 2023 16:08) (14 - 21)  SpO2: 95% (05 Dec 2023 16:08) (94% - 99%)    Parameters below as of 05 Dec 2023 15:08  Patient On (Oxygen Delivery Method): nasal cannula  O2 Flow (L/min): 2      I&O's Summary    05 Dec 2023 07:01  -  05 Dec 2023 17:00  --------------------------------------------------------  IN: 240 mL / OUT: 715 mL / NET: -475 mL      Physical Exam  Gen: NAD, A&Ox3  Pulm: No respiratory distress, no subcostal retractions  CV: RRR, no JVD  Abd: Soft, NT, ND  Breast:  dressing c/d/i   Drains: BRADY x       69yo F PMHx of asthma, PSHx of cholecystectomy, tonsillectomy, chemoport placement, recently diagnosed R. multifocal IDC (Stage IIA; [id number] - ER/[place] negative, HER2+), L. DCIS (stage 0), s/p neoadjuvant chemotherapy, now presenting for surgical management. Now s/p b/l mastectomy w/ SLNB (general surgery), with b/l tissue expander insertion w/ alloderm (plastic surgery) (12/5).    Reg diet/IVF  Pain/nausea control prn  Ancef x24 hours postop (x2 more doses)  Home meds as appropriate  Valium prn for muscle spasm  JPx4 (2 left breast, 2 right - one inferior, one superior to expander)  SCDs, holding SQH  No am labs       General Surgery Post op Check    Pt seen and examined without complaints. Pain is controlled. Denies SOB/CP/N/V.     Vital Signs Last 24 Hrs  T(C): 36.3 (05 Dec 2023 13:08), Max: 36.7 (05 Dec 2023 07:34)  T(F): 97.3 (05 Dec 2023 13:08), Max: 97.3 (05 Dec 2023 13:08)  HR: 82 (05 Dec 2023 16:08) (76 - 90)  BP: 143/77 (05 Dec 2023 16:08) (123/75 - 150/87)  BP(mean): 103 (05 Dec 2023 16:08) (101 - 112)  RR: 14 (05 Dec 2023 16:08) (14 - 21)  SpO2: 95% (05 Dec 2023 16:08) (94% - 99%)    Parameters below as of 05 Dec 2023 15:08  Patient On (Oxygen Delivery Method): nasal cannula  O2 Flow (L/min): 2      I&O's Summary    05 Dec 2023 07:01  -  05 Dec 2023 17:00  --------------------------------------------------------  IN: 240 mL / OUT: 715 mL / NET: -475 mL      Physical Exam  Gen: NAD, A&Ox3  Pulm: No respiratory distress, no subcostal retractions  CV: RRR, no JVD  Abd: Soft, NT, ND  Breast:  dressing c/d/i   Drains: BRADY x       67yo F PMHx of asthma, PSHx of cholecystectomy, tonsillectomy, chemoport placement, recently diagnosed R. multifocal IDC (Stage IIA; [id number] - ER/MA negative, HER2+), L. DCIS (stage 0), s/p neoadjuvant chemotherapy, now presenting for surgical management. Now s/p b/l mastectomy w/ SLNB (general surgery), with b/l tissue expander insertion w/ alloderm (plastic surgery) (12/5).    Reg diet/IVF  Pain/nausea control prn  Ancef x24 hours postop (x2 more doses)  Home meds as appropriate  Valium prn for muscle spasm  JPx4 (2 left breast, 2 right - one inferior, one superior to expander)  SCDs, holding SQH  No am labs

## 2023-12-05 NOTE — PROVIDER CONTACT NOTE (CHANGE IN STATUS NOTIFICATION) - SITUATION
Patient arrived to unit at 17:00 s/p bilateral mastectomy with SNLB, with bilateral tissue expander insertion with alloderm today 12/05/23. Patient has increased blood output on BRADY drains since arrival to unit, left breast edema noted on reassessment, no edema noted on admission to unit. Nausea, dizziness and lightheadedness expressed by patient.

## 2023-12-05 NOTE — H&P ADULT - NSICDXFAMILYHX_GEN_ALL_CORE_FT
FAMILY HISTORY:  Father  Still living? Unknown  FH: HTN (hypertension), Age at diagnosis: Age Unknown  FH: stroke, Age at diagnosis: Age Unknown  FH: type 2 diabetes, Age at diagnosis: Age Unknown    Mother  Still living? Unknown  Family history of CKD (chronic kidney disease), Age at diagnosis: Age Unknown  FH: CAD (coronary artery disease), Age at diagnosis: Age Unknown  FH: HTN (hypertension), Age at diagnosis: Age Unknown  FH: type 2 diabetes, Age at diagnosis: Age Unknown    Sibling  Still living? Unknown  FH: HTN (hypertension), Age at diagnosis: Age Unknown  FH: stroke, Age at diagnosis: Age Unknown  FH: type 2 diabetes, Age at diagnosis: Age Unknown

## 2023-12-05 NOTE — ASU DISCHARGE PLAN (ADULT/PEDIATRIC) - PROVIDER TOKENS
PROVIDER:[TOKEN:[[id number]]],PROVIDER:[TOKEN:[[id number]]] PROVIDER:[TOKEN:[[id number]]],PROVIDER:[TOKEN:[[id number]]]

## 2023-12-05 NOTE — PROVIDER CONTACT NOTE (CHANGE IN STATUS NOTIFICATION) - BACKGROUND
Patient is 68 year old female with history of arthritis, asthma, breast cancer, s/p neoadjuvant chemotherapy, last round in september 2023.

## 2023-12-05 NOTE — H&P ADULT - ASSESSMENT
68 year old female with a PMHx of asthma and a PSHx of cholecystectomy, tonsillectomy, and chemoport placement, She wasrecently diagnosed with R. multifocal IDC (Stage IIA; [id number] - ER/[place] negative, HER2+) and  L. DCIS (stage 0), s/p neoadjuvant chemotherapy. She presents now  for surgical management. She is planned to have a bilateral mastectomy with sentinel lymph node biopsy with the general surgery team and bilateral tissue expander insertion with alloderm with the plastic surgery team.      OR with general surgery and plastic surgery 68 year old female with a PMHx of asthma and a PSHx of cholecystectomy, tonsillectomy, and chemoport placement, She wasrecently diagnosed with R. multifocal IDC (Stage IIA; [id number] - ER/AL negative, HER2+) and  L. DCIS (stage 0), s/p neoadjuvant chemotherapy. She presents now  for surgical management. She is planned to have a bilateral mastectomy with sentinel lymph node biopsy with the general surgery team and bilateral tissue expander insertion with alloderm with the plastic surgery team.      OR with general surgery and plastic surgery

## 2023-12-05 NOTE — PROVIDER CONTACT NOTE (CHANGE IN STATUS NOTIFICATION) - ACTION/TREATMENT ORDERED:
CODY Remy ordered LR bolus, labs (CBC, BMP, Serum Magnesium, Phosphate, lactate, type and screen). Patient put on zoll monitor and upgraded to 8 lachman room 819-2 at 19:37.

## 2023-12-05 NOTE — PRE-ANESTHESIA EVALUATION ADULT - NSDENTALSD_ENT_ALL_CORE
appears normal and intact
Missing multiple teeth to b/l upper and lower; partial removed pre-op; chipped upper front right tooth/missing teeth

## 2023-12-05 NOTE — ASU DISCHARGE PLAN (ADULT/PEDIATRIC) - CARE PROVIDER_API CALL
Xochilt Roy [facility]  Surgery  [address]  [place] [address]  Phone: [phone]  Fax: [phone]  Follow Up Time:     Caridad Phelan  Plastic Surgery  [address] [place] [address]  Phone: [phone]  Fax: [phone]  Follow Up Time:    Xochilt Roy [facility]  Surgery  [address]  [place] [address]  Phone: [phone]  Fax: [phone]  Follow Up Time:     Caridad Phelan  Plastic Surgery  [address] [place] [address]  Phone: [phone]  Fax: [phone]  Follow Up Time:

## 2023-12-05 NOTE — BRIEF OPERATIVE NOTE - NSICDXBRIEFPROCEDURE_GEN_ALL_CORE_FT
PROCEDURES:  Bilateral mastectomy with sentinel node biopsy 05-Dec-2023 12:51:28  Aníbal Miranda  
PROCEDURES:  Bilateral mastectomy with insertion of tissue expander on both sides 05-Dec-2023 13:16:24  Kyle Stone  
PROCEDURES:  Evacuation of hematoma of left breast 05-Dec-2023 22:12:18  Mark Taylor

## 2023-12-05 NOTE — PRE-ANESTHESIA EVALUATION ADULT - NSANTHPEFT_GEN_ALL_CORE
Appearance is consistent with chronological age.   Moist mucus membranes  Unlabored breathing  Awake and alert
rrr, ctabl

## 2023-12-05 NOTE — H&P ADULT - NSICDXPASTMEDICALHX_GEN_ALL_CORE_FT
PAST MEDICAL HISTORY:  Arthritis     Asthma inhalator stopped since one yr ago    Breast cancer

## 2023-12-05 NOTE — BRIEF OPERATIVE NOTE - NSICDXBRIEFPREOP_GEN_ALL_CORE_FT
PRE-OP DIAGNOSIS:  Ductal carcinoma in situ of left breast 05-Dec-2023 12:54:17  Aníbal Miranda  HER2-negative carcinoma of breast 05-Dec-2023 12:54:26  Aníbal Miranda  
PRE-OP DIAGNOSIS:  Ductal carcinoma in situ of left breast 05-Dec-2023 12:54:17  Aníbal Miranda  HER2-negative carcinoma of breast 05-Dec-2023 12:54:26  Aníbal Miranda

## 2023-12-05 NOTE — PROVIDER CONTACT NOTE (CHANGE IN STATUS NOTIFICATION) - RECOMMENDATIONS
upgrade patient to telemetry unit; continue to monitor bleeding, give bolus of fluids to improve blood pressure

## 2023-12-05 NOTE — PRE-ANESTHESIA EVALUATION ADULT - NSPROPOSEDPROCEDFT_GEN_ALL_CORE
Bilateral placement of tissue expanders with alloderm, bilateral mastectomy, right sentinel lymph node biopsy, left magtrace
i&d breast

## 2023-12-05 NOTE — ASU DISCHARGE PLAN (ADULT/PEDIATRIC) - NS MD DC FALL RISK RISK
For information on Fall & Injury Prevention, visit: [email or web address] OR  [email or web address] OR  https://www.cdc.gov/steadi/patient.html For information on Fall & Injury Prevention, visit: [email or web address] OR  [email or web address]://[email or web address]/[email or web address]-[email or web address]-tips-to-avoid-injury OR  https://www.cdc.gov/steadi/patient.html

## 2023-12-05 NOTE — PRE-ANESTHESIA EVALUATION ADULT - NSANTHPMHFT_GEN_ALL_CORE
69yo F with h/o asthma and OA now with breast cancer presenting for bilateral placement of tissue expanders with alloderm, bilateral mastectomy, right sentinel lymph node biopsy, left magtrace. 67yo F with h/o asthma and OA (s/p lumbar spine surgery and L hip replacement; anticipates future R DAY) now with breast cancer presenting for bilateral placement of tissue expanders with alloderm, bilateral mastectomy, right sentinel lymph node biopsy, left magtrace. 69yo F with h/o asthma and OA (s/p lumbar spine surgery and L hip replacement; anticipates future R DAY) now with breast cancer presenting for bilateral placement of tissue expanders with alloderm, bilateral mastectomy, right sentinel lymph node biopsy, left magtrace. 67yo F with h/o mild-intermittent asthma (last used prn inhaler 1 year ago, not on daily maintenance inhalers) and OA (s/p lumbar spine surgery and L hip replacement; anticipates future R DAY) now with breast cancer presenting for bilateral placement of tissue expanders with alloderm, bilateral mastectomy, right sentinel lymph node biopsy, left magtrace. 69yo F with h/o mild-intermittent asthma (last used prn inhaler 1 year ago, not on daily maintenance inhalers) and OA (s/p lumbar spine surgery and L hip replacement; anticipates future R DAY) now with breast cancer presenting for bilateral placement of tissue expanders with alloderm, bilateral mastectomy, right sentinel lymph node biopsy, left magtrace.

## 2023-12-05 NOTE — H&P ADULT - NSHPPHYSICALEXAM_GEN_ALL_CORE
Gen: NAD, resting comfortably in bed  Neuro: AAOx3, follows commands  Pulm: Nonlabored breathing, no respiratory distress   C/V: pulses present and strong b/l in UE  Abd: soft, NT/ND, No rebound or guarding  Extrem: WWP, no calf edema or tenderness

## 2023-12-05 NOTE — PRE-ANESTHESIA EVALUATION ADULT - LAST ECHOCARDIOGRAM
09/2023 - no e/o pHTN, mild AR, dilated ascending aorta to 4cm, normal EF
09/2023 - no e/o pHTN, mild AR, dilated ascending aorta to 4cm, normal EF

## 2023-12-05 NOTE — CHART NOTE - NSCHARTNOTEFT_GEN_A_CORE
At Approximately 19:00, LOIDA Syed alerted me with high BRADY drain output from the left breast. Vital signs done at the same time and were normal.   I immediately went to see the patient.  Patient complained of some discomfort in left breast but was mild. Admitted she ate dinner well without n/v 45-60min prior. Upon examination with LOIDA Syed, patient was A&Ox3, JPx2 on right breast were SS and JPX2 on left breast were sanguinous. Left breast was significantly larger then the right.   Chief on call KP alerted. repeat BP was 83/52. 1 liter was bolused with good response. pressure dressings applied with abd pads, sports bra and 2 ace wrapped tightly around the chest. STAT labs sent and additional IV placed on left forearm. Dr. Phelan and Dr. Roy were informed by  with decision to upgrade patient to telemetry and add on to OR as class 2 for hematoma evacuation.

## 2023-12-05 NOTE — PRE-ANESTHESIA EVALUATION ADULT - NS MD HP INPLANTS MED DEV
Requested Prescriptions       Message   Received: Today   Message Contents   ORLIN Jimenez MD   Cc: Raheem Bernabe MD   Caller: pt (Today,  4:14 PM)             Pt was last seen in Sept . Ms. Carlisle stated that the pharmacy told  Her  Nov RX is  because it has the printed date of Sept,  and would like to know if she can bring in the Nov Rx to get an updated Rx of her Adderall .      As covering physician for Dr. Bernabe, who is supervisor for , will refill pts medication until her next appointment with  next month. Checked LA  no irregularities noted.     ANGELIA LACEY MD   Department of Psychiatry   [facility]  2017 4:35 PM  
S/p left DAY/Artificial joint
S/p left DAY/Artificial joint

## 2023-12-05 NOTE — BRIEF OPERATIVE NOTE - NSICDXBRIEFPOSTOP_GEN_ALL_CORE_FT
POST-OP DIAGNOSIS:  Ductal carcinoma in situ of left breast 05-Dec-2023 12:54:42  Aníbal Miranda  HER2-positive carcinoma of right breast 05-Dec-2023 12:54:51  Aníbal Miranda

## 2023-12-05 NOTE — H&P ADULT - NSVTERISKREFERASSESS_GEN_ALL_CORE
Refer to the Assessment tab to view/cancel completed assessment.
severe agitation...
severe agitation...

## 2023-12-05 NOTE — PROGRESS NOTE ADULT - SUBJECTIVE AND OBJECTIVE BOX
Procedure: hematoma evac  Surgeon: Zhane    S: Pt has no complaints. Denies CP, SOB, JOHNSON, calf tenderness. Pain controlled with medication.    O:  T(C): 36.1 (12-05-23 @ 23:36), Max: 36.8 (12-05-23 @ 19:56)  T(F): 97 (12-05-23 @ 23:36), Max: 98.2 (12-05-23 @ 20:48)  HR: 100 (12-05-23 @ 23:36) (74 - 101)  BP: 154/83 (12-05-23 @ 23:36) (83/58 - 157/71)  RR: 25 (12-05-23 @ 23:36) (14 - 25)  SpO2: 98% (12-05-23 @ 23:36) (97% - 100%)  Wt(kg): --                        10.5   14.33 )-----------( 222      ( 05 Dec 2023 22:37 )             31.5     12-05    138  |  104  |  16  ----------------------------<  224<H>  4.5   |  24  |  0.91    Ca    8.7      05 Dec 2023 19:37  Phos  4.3     12-05  Mg     1.7     12-05        Gen: NAD, resting comfortably in bed  Breast: b/l breast soft without swelling or tenderness. incision C/D/I. JPx4 w/ SS output.  C/V: NSR  Pulm: Nonlabored breathing, no respiratory distress  Extrem: WWP, no calf edema, SCDs in place      A/P: 68yFemale s/p b/l mastectomy w/ tissue expanders c/b left breast hematoma now s/p hematoma evac w/ 250cc clotted blood  Diet: NPO  IVF: LR@120  Pain/nausea control  DVT ppx: SCDs, Holding SQH  am labs with lactate

## 2023-12-05 NOTE — H&P ADULT - HISTORY OF PRESENT ILLNESS
68 year old female with a PMHx of asthma and a PSHx of cholecystectomy, tonsillectomy, and chemoport placement, She wasrecently diagnosed with R. multifocal IDC (Stage IIA; mH2I0O6 - ER/AL negative, HER2+) and  L. DCIS (stage 0), s/p neoadjuvant chemotherapy. She presents now  for surgical management. She is planned to have a bilateral mastectomy with sentinel lymph node biopsy with the general surgery team and bilateral tissue expander insertion with alloderm with the plastic surgery team. She has no acute complaints currently. 68 year old female with a PMHx of asthma and a PSHx of cholecystectomy, tonsillectomy, and chemoport placement, She wasrecently diagnosed with R. multifocal IDC (Stage IIA; [id number] - ER/NC negative, HER2+) and  L. DCIS (stage 0), s/p neoadjuvant chemotherapy. She presents now  for surgical management. She is planned to have a bilateral mastectomy with sentinel lymph node biopsy with the general surgery team and bilateral tissue expander insertion with alloderm with the plastic surgery team. She has no acute complaints currently.

## 2023-12-05 NOTE — H&P ADULT - NSICDXPASTSURGICALHX_GEN_ALL_CORE_FT
PAST SURGICAL HISTORY:  History of bunionectomy of left great toe     History of cholecystectomy     History of hip replacement left    History of lumbar surgery L3-4,    History of tonsillectomy

## 2023-12-05 NOTE — PROVIDER CONTACT NOTE (CHANGE IN STATUS NOTIFICATION) - ASSESSMENT
Patient's vital signs showed BP: 83/58 P: 74 BPM O2: 98% room air T: 98.2  Patient appears pale, complained of nausea, light headedness and dizziness  BRADY drain #1 on left breast output is 105mL in 2 hours since arrival to unit  BRADY drain #2 on left breast output is 245mL in 2 hours since arrival to unit

## 2023-12-05 NOTE — PRE-ANESTHESIA EVALUATION ADULT - NSANTHAIRWAYFT_ENT_ALL_CORE
Normal mouth opening, normal thyromental distance, normal neck extension without pain.
normal mentohyoid distance, normal neck rom, good mouthopening

## 2023-12-06 LAB
ANION GAP SERPL CALC-SCNC: 7 MMOL/L — SIGNIFICANT CHANGE UP (ref 5–17)
ANION GAP SERPL CALC-SCNC: 7 MMOL/L — SIGNIFICANT CHANGE UP (ref 5–17)
BUN SERPL-MCNC: 17 MG/DL — SIGNIFICANT CHANGE UP (ref 7–23)
BUN SERPL-MCNC: 17 MG/DL — SIGNIFICANT CHANGE UP (ref 7–23)
CALCIUM SERPL-MCNC: 8.6 MG/DL — SIGNIFICANT CHANGE UP (ref 8.4–10.5)
CALCIUM SERPL-MCNC: 8.6 MG/DL — SIGNIFICANT CHANGE UP (ref 8.4–10.5)
CHLORIDE SERPL-SCNC: 105 MMOL/L — SIGNIFICANT CHANGE UP (ref 96–108)
CHLORIDE SERPL-SCNC: 105 MMOL/L — SIGNIFICANT CHANGE UP (ref 96–108)
CO2 SERPL-SCNC: 25 MMOL/L — SIGNIFICANT CHANGE UP (ref 22–31)
CO2 SERPL-SCNC: 25 MMOL/L — SIGNIFICANT CHANGE UP (ref 22–31)
CREAT SERPL-MCNC: 0.87 MG/DL — SIGNIFICANT CHANGE UP (ref 0.5–1.3)
CREAT SERPL-MCNC: 0.87 MG/DL — SIGNIFICANT CHANGE UP (ref 0.5–1.3)
EGFR: 72 ML/MIN/1.73M2 — SIGNIFICANT CHANGE UP
EGFR: 72 ML/MIN/1.73M2 — SIGNIFICANT CHANGE UP
GLUCOSE SERPL-MCNC: 167 MG/DL — HIGH (ref 70–99)
GLUCOSE SERPL-MCNC: 167 MG/DL — HIGH (ref 70–99)
HCT VFR BLD CALC: 25.8 % — LOW (ref 34.5–45)
HCT VFR BLD CALC: 25.8 % — LOW (ref 34.5–45)
HGB BLD-MCNC: 8.6 G/DL — LOW (ref 11.5–15.5)
HGB BLD-MCNC: 8.6 G/DL — LOW (ref 11.5–15.5)
LACTATE SERPL-SCNC: 1.1 MMOL/L — SIGNIFICANT CHANGE UP (ref 0.5–2)
LACTATE SERPL-SCNC: 1.1 MMOL/L — SIGNIFICANT CHANGE UP (ref 0.5–2)
MAGNESIUM SERPL-MCNC: 1.7 MG/DL — SIGNIFICANT CHANGE UP (ref 1.6–2.6)
MAGNESIUM SERPL-MCNC: 1.7 MG/DL — SIGNIFICANT CHANGE UP (ref 1.6–2.6)
MCHC RBC-ENTMCNC: 29.4 PG — SIGNIFICANT CHANGE UP (ref 27–34)
MCHC RBC-ENTMCNC: 29.4 PG — SIGNIFICANT CHANGE UP (ref 27–34)
MCHC RBC-ENTMCNC: 33.3 GM/DL — SIGNIFICANT CHANGE UP (ref 32–36)
MCHC RBC-ENTMCNC: 33.3 GM/DL — SIGNIFICANT CHANGE UP (ref 32–36)
MCV RBC AUTO: 88.1 FL — SIGNIFICANT CHANGE UP (ref 80–100)
MCV RBC AUTO: 88.1 FL — SIGNIFICANT CHANGE UP (ref 80–100)
NRBC # BLD: 0 /100 WBCS — SIGNIFICANT CHANGE UP (ref 0–0)
NRBC # BLD: 0 /100 WBCS — SIGNIFICANT CHANGE UP (ref 0–0)
PHOSPHATE SERPL-MCNC: 3.6 MG/DL — SIGNIFICANT CHANGE UP (ref 2.5–4.5)
PHOSPHATE SERPL-MCNC: 3.6 MG/DL — SIGNIFICANT CHANGE UP (ref 2.5–4.5)
PLATELET # BLD AUTO: 190 K/UL — SIGNIFICANT CHANGE UP (ref 150–400)
PLATELET # BLD AUTO: 190 K/UL — SIGNIFICANT CHANGE UP (ref 150–400)
POTASSIUM SERPL-MCNC: 4.7 MMOL/L — SIGNIFICANT CHANGE UP (ref 3.5–5.3)
POTASSIUM SERPL-MCNC: 4.7 MMOL/L — SIGNIFICANT CHANGE UP (ref 3.5–5.3)
POTASSIUM SERPL-SCNC: 4.7 MMOL/L — SIGNIFICANT CHANGE UP (ref 3.5–5.3)
POTASSIUM SERPL-SCNC: 4.7 MMOL/L — SIGNIFICANT CHANGE UP (ref 3.5–5.3)
RBC # BLD: 2.93 M/UL — LOW (ref 3.8–5.2)
RBC # BLD: 2.93 M/UL — LOW (ref 3.8–5.2)
RBC # FLD: 12 % — SIGNIFICANT CHANGE UP (ref 10.3–14.5)
RBC # FLD: 12 % — SIGNIFICANT CHANGE UP (ref 10.3–14.5)
SODIUM SERPL-SCNC: 137 MMOL/L — SIGNIFICANT CHANGE UP (ref 135–145)
SODIUM SERPL-SCNC: 137 MMOL/L — SIGNIFICANT CHANGE UP (ref 135–145)
WBC # BLD: 7.36 K/UL — SIGNIFICANT CHANGE UP (ref 3.8–10.5)
WBC # BLD: 7.36 K/UL — SIGNIFICANT CHANGE UP (ref 3.8–10.5)
WBC # FLD AUTO: 7.36 K/UL — SIGNIFICANT CHANGE UP (ref 3.8–10.5)
WBC # FLD AUTO: 7.36 K/UL — SIGNIFICANT CHANGE UP (ref 3.8–10.5)

## 2023-12-06 PROCEDURE — 10140 I&D HMTMA SEROMA/FLUID COLLJ: CPT | Mod: 78

## 2023-12-06 RX ORDER — BENZOCAINE AND MENTHOL 5; 1 G/100ML; G/100ML
1 LIQUID ORAL ONCE
Refills: 0 | Status: COMPLETED | OUTPATIENT
Start: 2023-12-06 | End: 2023-12-06

## 2023-12-06 RX ORDER — MAGNESIUM SULFATE 500 MG/ML
1 VIAL (ML) INJECTION ONCE
Refills: 0 | Status: COMPLETED | OUTPATIENT
Start: 2023-12-06 | End: 2023-12-06

## 2023-12-06 RX ADMIN — OXYCODONE HYDROCHLORIDE 5 MILLIGRAM(S): 5 TABLET ORAL at 21:02

## 2023-12-06 RX ADMIN — SODIUM CHLORIDE 120 MILLILITER(S): 9 INJECTION, SOLUTION INTRAVENOUS at 17:09

## 2023-12-06 RX ADMIN — OXYCODONE HYDROCHLORIDE 5 MILLIGRAM(S): 5 TABLET ORAL at 01:54

## 2023-12-06 RX ADMIN — SODIUM CHLORIDE 60 MILLILITER(S): 9 INJECTION, SOLUTION INTRAVENOUS at 19:29

## 2023-12-06 RX ADMIN — OXYCODONE HYDROCHLORIDE 2.5 MILLIGRAM(S): 5 TABLET ORAL at 14:34

## 2023-12-06 RX ADMIN — Medication 100 MILLIGRAM(S): at 08:58

## 2023-12-06 RX ADMIN — OXYCODONE HYDROCHLORIDE 5 MILLIGRAM(S): 5 TABLET ORAL at 02:13

## 2023-12-06 RX ADMIN — OXYCODONE HYDROCHLORIDE 2.5 MILLIGRAM(S): 5 TABLET ORAL at 15:15

## 2023-12-06 RX ADMIN — OXYCODONE HYDROCHLORIDE 5 MILLIGRAM(S): 5 TABLET ORAL at 22:10

## 2023-12-06 RX ADMIN — SODIUM CHLORIDE 1000 MILLILITER(S): 9 INJECTION, SOLUTION INTRAVENOUS at 01:43

## 2023-12-06 RX ADMIN — Medication 100 MILLIGRAM(S): at 01:06

## 2023-12-06 RX ADMIN — BENZOCAINE AND MENTHOL 1 LOZENGE: 5; 1 LIQUID ORAL at 15:43

## 2023-12-06 RX ADMIN — OXYCODONE HYDROCHLORIDE 5 MILLIGRAM(S): 5 TABLET ORAL at 09:45

## 2023-12-06 RX ADMIN — OXYCODONE HYDROCHLORIDE 5 MILLIGRAM(S): 5 TABLET ORAL at 08:58

## 2023-12-06 RX ADMIN — Medication 100 GRAM(S): at 08:58

## 2023-12-06 NOTE — PROVIDER CONTACT NOTE (OTHER) - SITUATION
Patient sinus tachy up to 135 maintained ST for at least 5min. Patient was OOB to restroom 10 min ago.

## 2023-12-06 NOTE — PROGRESS NOTE ADULT - SUBJECTIVE AND OBJECTIVE BOX
SUBJECTIVE:  Doing well this morning after OR take back for L hematoma overnight.     OBJECTIVE:     ** VITAL SIGNS / I&O's **    Vital Signs Last 24 Hrs  T(C): 37.2 (06 Dec 2023 04:41), Max: 37.2 (06 Dec 2023 04:41)  T(F): 98.9 (06 Dec 2023 04:41), Max: 98.9 (06 Dec 2023 04:41)  HR: 96 (06 Dec 2023 03:30) (74 - 105)  BP: 137/61 (06 Dec 2023 03:30) (83/58 - 157/71)  BP(mean): 88 (06 Dec 2023 03:30) (88 - 112)  RR: 17 (06 Dec 2023 03:30) (14 - 25)  SpO2: 96% (06 Dec 2023 03:30) (94% - 100%)    Parameters below as of 06 Dec 2023 03:30  Patient On (Oxygen Delivery Method): room air          05 Dec 2023 07:01  -  06 Dec 2023 07:00  --------------------------------------------------------  IN:    IV PiggyBack: 50 mL    IV PiggyBack: 1000 mL    Lactated Ringers: 1860 mL  Total IN: 2910 mL    OUT:    Blood Loss (mL): 250 mL    Bulb (mL): 105 mL    Bulb (mL): 183 mL    Bulb (mL): 55 mL    Bulb (mL): 520 mL    Voided (mL): 2100 mL  Total OUT: 3213 mL    Total NET: -303 mL          ** PHYSICAL EXAM **    -- CONSTITUTIONAL: Alert, Awake. NAD.   -- RESPIRATORY: unlabored breathing, no respiratory distress  -- B/L breasts: Bilateral breasts soft, appropriately tender. Incisions c/d/i. (+) L sided chemoport in place, annamarie x 4 serosanguinous      ** LABS **                          8.6    7.36  )-----------( 190      ( 06 Dec 2023 05:30 )             25.8     06 Dec 2023 05:30    137    |  105    |  17     ----------------------------<  167    4.7     |  25     |  0.87     Ca    8.6        06 Dec 2023 05:30  Phos  3.6       06 Dec 2023 05:30  Mg     1.7       06 Dec 2023 05:30      PT/INR - ( 05 Dec 2023 19:37 )   PT: 12.3 sec;   INR: 1.08          PTT - ( 05 Dec 2023 19:37 )  PTT:25.3 sec  CAPILLARY BLOOD GLUCOSE

## 2023-12-06 NOTE — PROVIDER CONTACT NOTE (OTHER) - ACTION/TREATMENT ORDERED:
PA made aware of HR. PA states HR is okay as long as BP is within parameters. PA asks RN to do a bladder scan. Care ongoing
Per Aníbal Team 1, no interventions.
10:25am  Per Cynthia (Team 1) no interventions for HR, will put in parameters for pt.

## 2023-12-06 NOTE — PROVIDER CONTACT NOTE (OTHER) - ASSESSMENT
-110, /79, satting 98% on RA, RR 17. Complaining of 6/10 pain.
 /78 M 107 R 17 SPO2 97%
Pt denies pain, not ambulating sitting in chair /81 R 17 SPO2 97% NSR

## 2023-12-06 NOTE — PROVIDER CONTACT NOTE (OTHER) - REASON
Pt , at start of shift was in the low to id 80's, also can we add parameters? Pt -120, at start of shift was in the low to id 80's

## 2023-12-06 NOTE — PROGRESS NOTE ADULT - ASSESSMENT
69yo F s/p b/l mastectomy w/ SLNB with b/l tissue expander insertion w/ alloderm s/p RTOR last night for hematoma evacuation  - advance to regular diet  - Pain/nausea control prn  - IV Ancef  - annamarie care  - dc later today versus tomorrow per general surgery   67yo F s/p b/l mastectomy w/ SLNB with b/l tissue expander insertion w/ alloderm s/p RTOR last night for hematoma evacuation  - advance to regular diet  - Pain/nausea control prn  - IV Ancef  - annamarie care  - dc later today versus tomorrow per general surgery

## 2023-12-06 NOTE — PROGRESS NOTE ADULT - SUBJECTIVE AND OBJECTIVE BOX
STATUS POST:  Bilateral mastectomy with sentinel node biopsy    Bilateral mastectomy with insertion of tissue expander on both sides    Evacuation of hematoma of chest    Evacuation of hematoma of left breast        POST OPERATIVE DAY #:     SUBJECTIVE:     Vital Signs Last 24 Hrs  T(C): 37.2 (06 Dec 2023 04:41), Max: 37.2 (06 Dec 2023 04:41)  T(F): 98.9 (06 Dec 2023 04:41), Max: 98.9 (06 Dec 2023 04:41)  HR: 96 (06 Dec 2023 03:30) (74 - 105)  BP: 137/61 (06 Dec 2023 03:30) (83/58 - 157/71)  BP(mean): 88 (06 Dec 2023 03:30) (88 - 112)  RR: 17 (06 Dec 2023 03:30) (14 - 25)  SpO2: 96% (06 Dec 2023 03:30) (94% - 100%)    Parameters below as of 06 Dec 2023 03:30  Patient On (Oxygen Delivery Method): room air        I&O's Summary    05 Dec 2023 07:01  -  06 Dec 2023 06:11  --------------------------------------------------------  IN: 2790 mL / OUT: 3213 mL / NET: -423 mL        Physical Exam:  General Appearance: Appears well, NAD  Pulmonary: Nonlabored breathing, no respiratory distress  Cardiovascular: NSR  Abdomen: Soft, nondisteded, appropriate incisional tenderness, dressings clean and dry and intact  Extremities: WWP, SCD's in place     LABS:                        10.5   14.33 )-----------( 222      ( 05 Dec 2023 22:37 )             31.5     12-05    138  |  104  |  16  ----------------------------<  224<H>  4.5   |  24  |  0.91    Ca    8.7      05 Dec 2023 19:37  Phos  4.3     12-05  Mg     1.7     12-05      PT/INR - ( 05 Dec 2023 19:37 )   PT: 12.3 sec;   INR: 1.08          PTT - ( 05 Dec 2023 19:37 )  PTT:25.3 sec  Urinalysis Basic - ( 05 Dec 2023 19:37 )    Color: x / Appearance: x / SG: x / pH: x  Gluc: 224 mg/dL / Ketone: x  / Bili: x / Urobili: x   Blood: x / Protein: x / Nitrite: x   Leuk Esterase: x / RBC: x / WBC x   Sq Epi: x / Non Sq Epi: x / Bacteria: x       STATUS POST:  Bilateral mastectomy with sentinel node biopsy  Bilateral mastectomy with insertion of tissue expander on both sides  Evacuation of hematoma of chest  Evacuation of hematoma of left breast    SUBJECTIVE: Patient seen and evaluated on morning rounds with chief resident. Patient states she feels well and has no acute complaints. Pain well controlled. Denies any f/ch/n/v/chp/sob.     Vital Signs Last 24 Hrs  T(C): 37.2 (06 Dec 2023 04:41), Max: 37.2 (06 Dec 2023 04:41)  T(F): 98.9 (06 Dec 2023 04:41), Max: 98.9 (06 Dec 2023 04:41)  HR: 96 (06 Dec 2023 03:30) (74 - 105)  BP: 137/61 (06 Dec 2023 03:30) (83/58 - 157/71)  BP(mean): 88 (06 Dec 2023 03:30) (88 - 112)  RR: 17 (06 Dec 2023 03:30) (14 - 25)  SpO2: 96% (06 Dec 2023 03:30) (94% - 100%)    Parameters below as of 06 Dec 2023 03:30  Patient On (Oxygen Delivery Method): room air        I&O's Summary    05 Dec 2023 07:01  -  06 Dec 2023 06:11  --------------------------------------------------------  IN: 2790 mL / OUT: 3213 mL / NET: -423 mL        Physical Exam:  General Appearance: Appears well, NAD  Chest: BRADY x 4 draining SS fluids. Bilateral breasts soft, appropriately tender. Incisions c/d/i  Pulmonary: Nonlabored breathing, no respiratory distress  Cardiovascular: NSR  Abdomen: Soft, nondisteded,  Extremities: WWP, SCD's in place     LABS:                        10.5   14.33 )-----------( 222      ( 05 Dec 2023 22:37 )             31.5     12-05    138  |  104  |  16  ----------------------------<  224<H>  4.5   |  24  |  0.91    Ca    8.7      05 Dec 2023 19:37  Phos  4.3     12-05  Mg     1.7     12-05      PT/INR - ( 05 Dec 2023 19:37 )   PT: 12.3 sec;   INR: 1.08          PTT - ( 05 Dec 2023 19:37 )  PTT:25.3 sec  Urinalysis Basic - ( 05 Dec 2023 19:37 )    Color: x / Appearance: x / SG: x / pH: x  Gluc: 224 mg/dL / Ketone: x  / Bili: x / Urobili: x   Blood: x / Protein: x / Nitrite: x   Leuk Esterase: x / RBC: x / WBC x   Sq Epi: x / Non Sq Epi: x / Bacteria: x       STATUS POST:  Bilateral mastectomy with sentinel node biopsy  Bilateral mastectomy with insertion of tissue expander on both sides  Evacuation of hematoma of chest  Evacuation of hematoma of left breast    SUBJECTIVE: Patient seen and evaluated on morning rounds with chief resident. Patient states she feels well and has no acute complaints. Pain well controlled. Denies any f/ch/n/v/chp/sob.     Vital Signs Last 24 Hrs  T(C): 37.2 (06 Dec 2023 04:41), Max: 37.2 (06 Dec 2023 04:41)  T(F): 98.9 (06 Dec 2023 04:41), Max: 98.9 (06 Dec 2023 04:41)  HR: 96 (06 Dec 2023 03:30) (74 - 105)  BP: 137/61 (06 Dec 2023 03:30) (83/58 - 157/71)  BP(mean): 88 (06 Dec 2023 03:30) (88 - 112)  RR: 17 (06 Dec 2023 03:30) (14 - 25)  SpO2: 96% (06 Dec 2023 03:30) (94% - 100%)    Parameters below as of 06 Dec 2023 03:30  Patient On (Oxygen Delivery Method): room air        I&O's Summary    05 Dec 2023 07:01  -  06 Dec 2023 06:11  --------------------------------------------------------  IN: 2790 mL / OUT: 3213 mL / NET: -423 mL        Physical Exam:  General Appearance: Appears well, NAD  Chest: BRADY x 4 draining SS fluids. Bilateral breasts soft, appropriately tender. Incisions c/d/i. (+) L sided chemoport in place   Pulmonary: Nonlabored breathing, no respiratory distress  Cardiovascular: NSR  Abdomen: Soft, nontender/nondisteded  Extremities: WWP, SCD's in place     LABS:                        10.5   14.33 )-----------( 222      ( 05 Dec 2023 22:37 )             31.5     12-05    138  |  104  |  16  ----------------------------<  224<H>  4.5   |  24  |  0.91    Ca    8.7      05 Dec 2023 19:37  Phos  4.3     12-05  Mg     1.7     12-05      PT/INR - ( 05 Dec 2023 19:37 )   PT: 12.3 sec;   INR: 1.08          PTT - ( 05 Dec 2023 19:37 )  PTT:25.3 sec  Urinalysis Basic - ( 05 Dec 2023 19:37 )    Color: x / Appearance: x / SG: x / pH: x  Gluc: 224 mg/dL / Ketone: x  / Bili: x / Urobili: x   Blood: x / Protein: x / Nitrite: x   Leuk Esterase: x / RBC: x / WBC x   Sq Epi: x / Non Sq Epi: x / Bacteria: x

## 2023-12-06 NOTE — PROVIDER CONTACT NOTE (OTHER) - SITUATION
Pt received from PACU at 0020 after evacuation of hematoma of L breast. s/p bilateral mastectomy 12/5.

## 2023-12-06 NOTE — PROVIDER CONTACT NOTE (OTHER) - SITUATION
Pt , at start of shift was in the low to id 80's, also can we add parameters? Pt , at start of shift was in the low to mid 80's

## 2023-12-06 NOTE — PROGRESS NOTE ADULT - SUBJECTIVE AND OBJECTIVE BOX
Patient seen and examined POD1 s/p bilateral TE recon and return to OR for left breast hematoma. Patient tired though no dizziness, LOC, SOB. Left drain with ~200cc output since OR though largely in the immediate postop period. Last outputs 20cc. The drain output is serosang with no clots. The left breast with ecchymotic skin; however it is soft, edge of expander and drains palpable. No concern for collection or hematoma. We will plan to defer discharge for now. Will monitor patient and advance ambulation and diet as tolerated. Continue abx while admitted.

## 2023-12-06 NOTE — PROGRESS NOTE ADULT - ASSESSMENT
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 DCIS (stage 0), s/p neoadjuvant chemotherapy, now presenting for surgical management. Now s/p b/l mastectomy w/ SLNB (general surgery), with b/l tissue expander insertion w/ alloderm (plastic surgery) (12/5). with RTOR for hematoma evac with 250cc clotted blood, no active bleeding on 12/5.    NPO/IVF  Pain/nausea control prn  Ancef x24 hours postop (x2 more doses)  Home meds as appropriate  Valium prn for muscle spasm  JPx4 (2 left breast, 2 right - one inferior, one superior to expander)  SCDs, holding SQH  No am labs 69yo F PMHx of asthma, PSHx of cholecystectomy, tonsillectomy, chemoport placement, recently diagnosed R. multifocal IDC (Stage IIA; [id number] - ER/[place] negative, HER2+), L. DCIS (stage 0), s/p neoadjuvant chemotherapy, now presenting for surgical management. Now s/p b/l mastectomy w/ SLNB (general surgery), with b/l tissue expander insertion w/ alloderm (plastic surgery) (12/5). with RTOR for hematoma evac with 250cc clotted blood, no active bleeding on 12/5.    NPO/IVF  Pain/nausea control prn  Ancef x24 hours postop (x2 more doses)  Home meds as appropriate  Valium prn for muscle spasm  JPx4 (2 left breast, 2 right - one inferior, one superior to expander)  SCDs, holding SQH  No am labs 69yo F PMHx of asthma, PSHx of cholecystectomy, tonsillectomy, chemoport placement, recently diagnosed R. multifocal IDC (Stage IIA; [id number] - ER/[place] negative, HER2+), L. DCIS (stage 0), s/p neoadjuvant chemotherapy, now presenting for surgical management. Now s/p b/l mastectomy w/ SLNB (general surgery), with b/l tissue expander insertion w/ alloderm (plastic surgery) (12/5). with RTOR for hematoma evac with 250cc clotted blood, no active bleeding on 12/5.    NPO/IVF  Pain/nausea control prn  Ancef x24 hours postop (x1 more doses)  Home meds as appropriate  Valium prn for muscle spasm  JPx4 (2 left breast, 2 right - one inferior, one superior to expander)  SCDs, holding SQH  No am labs 69yo F PMHx of asthma, PSHx of cholecystectomy, tonsillectomy, chemoport placement, recently diagnosed R. multifocal IDC (Stage IIA; [id number] - ER/[place] negative, HER2+), L. DCIS (stage 0), s/p neoadjuvant chemotherapy, now presenting for surgical management. Now s/p b/l mastectomy w/ SLNB (general surgery), with b/l tissue expander insertion w/ alloderm (plastic surgery) (12/5). with RTOR for hematoma evac with 250cc clotted blood, no active bleeding on 12/5.    NPO/IVF  Pain/nausea control prn  Ancef x24 hours postop (x1 more doses)  Home meds as appropriate  Valium prn for muscle spasm  JPx4 (2 left breast, 2 right - one inferior, one superior to expander)  SCDs, holding SQH  No am labs

## 2023-12-07 ENCOUNTER — TRANSCRIPTION ENCOUNTER (OUTPATIENT)
Age: 68
End: 2023-12-07

## 2023-12-07 LAB
ANION GAP SERPL CALC-SCNC: 7 MMOL/L — SIGNIFICANT CHANGE UP (ref 5–17)
ANION GAP SERPL CALC-SCNC: 7 MMOL/L — SIGNIFICANT CHANGE UP (ref 5–17)
BUN SERPL-MCNC: 22 MG/DL — SIGNIFICANT CHANGE UP (ref 7–23)
BUN SERPL-MCNC: 22 MG/DL — SIGNIFICANT CHANGE UP (ref 7–23)
CALCIUM SERPL-MCNC: 8.4 MG/DL — SIGNIFICANT CHANGE UP (ref 8.4–10.5)
CALCIUM SERPL-MCNC: 8.4 MG/DL — SIGNIFICANT CHANGE UP (ref 8.4–10.5)
CHLORIDE SERPL-SCNC: 105 MMOL/L — SIGNIFICANT CHANGE UP (ref 96–108)
CHLORIDE SERPL-SCNC: 105 MMOL/L — SIGNIFICANT CHANGE UP (ref 96–108)
CO2 SERPL-SCNC: 27 MMOL/L — SIGNIFICANT CHANGE UP (ref 22–31)
CO2 SERPL-SCNC: 27 MMOL/L — SIGNIFICANT CHANGE UP (ref 22–31)
CREAT SERPL-MCNC: 0.91 MG/DL — SIGNIFICANT CHANGE UP (ref 0.5–1.3)
CREAT SERPL-MCNC: 0.91 MG/DL — SIGNIFICANT CHANGE UP (ref 0.5–1.3)
EGFR: 68 ML/MIN/1.73M2 — SIGNIFICANT CHANGE UP
EGFR: 68 ML/MIN/1.73M2 — SIGNIFICANT CHANGE UP
GLUCOSE SERPL-MCNC: 130 MG/DL — HIGH (ref 70–99)
GLUCOSE SERPL-MCNC: 130 MG/DL — HIGH (ref 70–99)
HCT VFR BLD CALC: 25.2 % — LOW (ref 34.5–45)
HCT VFR BLD CALC: 25.2 % — LOW (ref 34.5–45)
HGB BLD-MCNC: 8 G/DL — LOW (ref 11.5–15.5)
HGB BLD-MCNC: 8 G/DL — LOW (ref 11.5–15.5)
MAGNESIUM SERPL-MCNC: 1.9 MG/DL — SIGNIFICANT CHANGE UP (ref 1.6–2.6)
MAGNESIUM SERPL-MCNC: 1.9 MG/DL — SIGNIFICANT CHANGE UP (ref 1.6–2.6)
MCHC RBC-ENTMCNC: 29.5 PG — SIGNIFICANT CHANGE UP (ref 27–34)
MCHC RBC-ENTMCNC: 29.5 PG — SIGNIFICANT CHANGE UP (ref 27–34)
MCHC RBC-ENTMCNC: 31.7 GM/DL — LOW (ref 32–36)
MCHC RBC-ENTMCNC: 31.7 GM/DL — LOW (ref 32–36)
MCV RBC AUTO: 93 FL — SIGNIFICANT CHANGE UP (ref 80–100)
MCV RBC AUTO: 93 FL — SIGNIFICANT CHANGE UP (ref 80–100)
NRBC # BLD: 0 /100 WBCS — SIGNIFICANT CHANGE UP (ref 0–0)
NRBC # BLD: 0 /100 WBCS — SIGNIFICANT CHANGE UP (ref 0–0)
PHOSPHATE SERPL-MCNC: 2.5 MG/DL — SIGNIFICANT CHANGE UP (ref 2.5–4.5)
PHOSPHATE SERPL-MCNC: 2.5 MG/DL — SIGNIFICANT CHANGE UP (ref 2.5–4.5)
PLATELET # BLD AUTO: 168 K/UL — SIGNIFICANT CHANGE UP (ref 150–400)
PLATELET # BLD AUTO: 168 K/UL — SIGNIFICANT CHANGE UP (ref 150–400)
POTASSIUM SERPL-MCNC: 4 MMOL/L — SIGNIFICANT CHANGE UP (ref 3.5–5.3)
POTASSIUM SERPL-MCNC: 4 MMOL/L — SIGNIFICANT CHANGE UP (ref 3.5–5.3)
POTASSIUM SERPL-SCNC: 4 MMOL/L — SIGNIFICANT CHANGE UP (ref 3.5–5.3)
POTASSIUM SERPL-SCNC: 4 MMOL/L — SIGNIFICANT CHANGE UP (ref 3.5–5.3)
RBC # BLD: 2.71 M/UL — LOW (ref 3.8–5.2)
RBC # BLD: 2.71 M/UL — LOW (ref 3.8–5.2)
RBC # FLD: 12.8 % — SIGNIFICANT CHANGE UP (ref 10.3–14.5)
RBC # FLD: 12.8 % — SIGNIFICANT CHANGE UP (ref 10.3–14.5)
SODIUM SERPL-SCNC: 139 MMOL/L — SIGNIFICANT CHANGE UP (ref 135–145)
SODIUM SERPL-SCNC: 139 MMOL/L — SIGNIFICANT CHANGE UP (ref 135–145)
WBC # BLD: 10.28 K/UL — SIGNIFICANT CHANGE UP (ref 3.8–10.5)
WBC # BLD: 10.28 K/UL — SIGNIFICANT CHANGE UP (ref 3.8–10.5)
WBC # FLD AUTO: 10.28 K/UL — SIGNIFICANT CHANGE UP (ref 3.8–10.5)
WBC # FLD AUTO: 10.28 K/UL — SIGNIFICANT CHANGE UP (ref 3.8–10.5)

## 2023-12-07 RX ORDER — CEPHALEXIN 500 MG
500 CAPSULE ORAL EVERY 8 HOURS
Refills: 0 | Status: DISCONTINUED | OUTPATIENT
Start: 2023-12-07 | End: 2023-12-08

## 2023-12-07 RX ORDER — ACETAMINOPHEN 500 MG
1000 TABLET ORAL EVERY 6 HOURS
Refills: 0 | Status: DISCONTINUED | OUTPATIENT
Start: 2023-12-07 | End: 2023-12-08

## 2023-12-07 RX ORDER — SODIUM,POTASSIUM PHOSPHATES 278-250MG
1 POWDER IN PACKET (EA) ORAL ONCE
Refills: 0 | Status: COMPLETED | OUTPATIENT
Start: 2023-12-07 | End: 2023-12-07

## 2023-12-07 RX ORDER — DIAZEPAM 5 MG
2 TABLET ORAL EVERY 8 HOURS
Refills: 0 | Status: DISCONTINUED | OUTPATIENT
Start: 2023-12-07 | End: 2023-12-08

## 2023-12-07 RX ORDER — CEPHALEXIN 500 MG
1 CAPSULE ORAL
Qty: 21 | Refills: 0
Start: 2023-12-07 | End: 2023-12-13

## 2023-12-07 RX ORDER — POTASSIUM PHOSPHATE, MONOBASIC POTASSIUM PHOSPHATE, DIBASIC 236; 224 MG/ML; MG/ML
15 INJECTION, SOLUTION INTRAVENOUS ONCE
Refills: 0 | Status: DISCONTINUED | OUTPATIENT
Start: 2023-12-07 | End: 2023-12-07

## 2023-12-07 RX ADMIN — Medication 1 PACKET(S): at 14:48

## 2023-12-07 RX ADMIN — Medication 1000 MILLIGRAM(S): at 18:00

## 2023-12-07 RX ADMIN — Medication 1000 MILLIGRAM(S): at 12:11

## 2023-12-07 RX ADMIN — Medication 1000 MILLIGRAM(S): at 17:47

## 2023-12-07 RX ADMIN — Medication 1000 MILLIGRAM(S): at 23:35

## 2023-12-07 RX ADMIN — OXYCODONE HYDROCHLORIDE 5 MILLIGRAM(S): 5 TABLET ORAL at 05:01

## 2023-12-07 RX ADMIN — OXYCODONE HYDROCHLORIDE 5 MILLIGRAM(S): 5 TABLET ORAL at 06:08

## 2023-12-07 RX ADMIN — OXYCODONE HYDROCHLORIDE 5 MILLIGRAM(S): 5 TABLET ORAL at 12:01

## 2023-12-07 RX ADMIN — Medication 500 MILLIGRAM(S): at 22:34

## 2023-12-07 RX ADMIN — OXYCODONE HYDROCHLORIDE 5 MILLIGRAM(S): 5 TABLET ORAL at 11:45

## 2023-12-07 RX ADMIN — Medication 500 MILLIGRAM(S): at 14:48

## 2023-12-07 RX ADMIN — Medication 1000 MILLIGRAM(S): at 11:46

## 2023-12-07 NOTE — PROGRESS NOTE ADULT - SUBJECTIVE AND OBJECTIVE BOX
Patient doing better this morning. Feels very tired and slightly dizzy after getting OOB and washed. Vitals stable. Drains more serosang and breasts without collections. Will work with PT this AM to encourage safe ambulation and DC home later this afternoon.

## 2023-12-07 NOTE — DISCHARGE NOTE PROVIDER - NSDCMRMEDTOKEN_GEN_ALL_CORE_FT
cephalexin 500 mg oral tablet: 1 tab(s) orally every 8 hours   cephalexin 500 mg oral tablet: 1 tab(s) orally every 8 hours  oxyCODONE 5 mg oral tablet: 1 tab(s) orally every 6 hours as needed for  severe pain MDD: 3 tabs

## 2023-12-07 NOTE — DISCHARGE NOTE PROVIDER - PROVIDER TOKENS
PROVIDER:[TOKEN:[[id number]],FOLLOWUP:[1 week]],PROVIDER:[TOKEN:[[id number]],FOLLOWUP:[1 week]] PROVIDER:[TOKEN:[[id number]],FOLLOWUP:[1 week]],PROVIDER:[TOKEN:[[id number]],FOLLOWUP:[1 week]]

## 2023-12-07 NOTE — PROGRESS NOTE ADULT - ASSESSMENT
67yo F PMHx of asthma, PSHx of cholecystectomy, tonsillectomy, chemoport placement, recently diagnosed R. multifocal IDC (Stage IIA; [id number] - ER/[place] negative, HER2+), L. DCIS (stage 0), s/p neoadjuvant chemotherapy, now presenting for surgical management. Now s/p b/l mastectomy w/ SLNB (general surgery), with b/l tissue expander insertion w/ alloderm (plastic surgery) (12/5). with RTOR for hematoma evac with 250cc clotted blood, no active bleeding on 12/5. Patient recovering well.     Plan:  - c/w regular diet  - optimize pain control  - d/c later today if pain well controlled  Plan discussed with chief resident and attending, Dr. Phelan 69yo F PMHx of asthma, PSHx of cholecystectomy, tonsillectomy, chemoport placement, recently diagnosed R. multifocal IDC (Stage IIA; [id number] - ER/[place] negative, HER2+), L. DCIS (stage 0), s/p neoadjuvant chemotherapy, now presenting for surgical management. Now s/p b/l mastectomy w/ SLNB (general surgery), with b/l tissue expander insertion w/ alloderm (plastic surgery) (12/5). with RTOR for hematoma evac with 250cc clotted blood, no active bleeding on 12/5. Patient recovering well.     Plan:  - c/w regular diet  - optimize pain control  - d/c later today if pain well controlled  Plan discussed with chief resident and attending, Dr. Phelan

## 2023-12-07 NOTE — DISCHARGE NOTE PROVIDER - CARE PROVIDER_API CALL
Xochilt Roy [facility]  Surgery  [address]  [place] [address]  Phone: [phone]  Fax: [phone]  Follow Up Time: 1 week    Caridad Phelan  Plastic Surgery  [address] [place] [address]  Phone: [phone]  Fax: [phone]  Follow Up Time: 1 week   Xochilt Roy [facility]  Surgery  [address]  [place] [address]  Phone: [phone]  Fax: [phone]  Follow Up Time: 1 week    Caridad Phelan  Plastic Surgery  [address] [place] [address]  Phone: [phone]  Fax: [phone]  Follow Up Time: 1 week

## 2023-12-07 NOTE — DISCHARGE NOTE PROVIDER - NSDCFUSCHEDAPPT_GEN_ALL_CORE_FT
Caridad Phelan  [facility] Physician [facility]  PLASTICSUR [address]  Scheduled Appointment: 12/14/2023    Xochilt Roy  [facility] Physician [facility]  BREAST [address]  Scheduled Appointment: 12/15/2023    Brandee Marsh  [facility] Physician [facility]  HEMONC [address]  Scheduled Appointment: 12/27/2023     Caridad Phelan  [facility] Physician [facility]  PLASTICSUR [address]  Scheduled Appointment: 12/14/2023    Xochilt Roy  [facility] Physician [facility]  BREAST [address]  Scheduled Appointment: 12/15/2023    Brandee Marsh  [facility] Physician [facility]  HEMONC [address]  Scheduled Appointment: 12/27/2023

## 2023-12-07 NOTE — PROGRESS NOTE ADULT - ASSESSMENT
69yo F PMHx of asthma, PSHx of cholecystectomy, tonsillectomy, chemoport placement, recently diagnosed R. multifocal IDC (Stage IIA; [id number] - ER/[place] negative, HER2+), L. DCIS (stage 0), s/p neoadjuvant chemotherapy, now presenting for surgical management. Now s/p b/l mastectomy w/ SLNB (general surgery), with b/l tissue expander insertion w/ alloderm (plastic surgery) (12/5). with RTOR for hematoma evac with 250cc clotted blood, no active bleeding on 12/5. Doing well, no acute complaints at this time, plan to d/c home today.     Reg diet  Pain/nausea control prn  Home meds as appropriate  Valium prn for muscle spasm  JPx4 (2 left breast, 2 right - one inferior, one superior to expander)  SCDs, holding SQH  No am labs 69yo F PMHx of asthma, PSHx of cholecystectomy, tonsillectomy, chemoport placement, recently diagnosed R. multifocal IDC (Stage IIA; sF6T0D1 - ER/[place] negative, HER2+), L. DCIS (stage 0), s/p neoadjuvant chemotherapy, now presenting for surgical management. Now s/p b/l mastectomy w/ SLNB (general surgery), with b/l tissue expander insertion w/ alloderm (plastic surgery) (12/5). with RTOR for hematoma evac with 250cc clotted blood, no active bleeding on 12/5. Doing well, no acute complaints at this time, plan to d/c home today.     Reg diet  Pain/nausea control prn  Home meds as appropriate  Valium prn for muscle spasm  JPx4 (2 left breast, 2 right - one inferior, one superior to expander)  SCDs, holding SQH  No am labs

## 2023-12-07 NOTE — PROGRESS NOTE ADULT - SUBJECTIVE AND OBJECTIVE BOX
STATUS POST:  Bilateral mastectomy with sentinel node biopsy    Bilateral mastectomy with insertion of tissue expander on both sides    Evacuation of hematoma of chest    Evacuation of hematoma of left breast    SUBJECTIVE: Pt seen and examined at the bedside by surgical team. Doing well, no acute complaints at this time, reporting continued post surgical pain B/L.    Vital Signs Last 24 Hrs  T(C): 36.6 (07 Dec 2023 04:37), Max: 37.1 (06 Dec 2023 21:45)  T(F): 97.8 (07 Dec 2023 04:37), Max: 98.8 (06 Dec 2023 21:45)  HR: 80 (07 Dec 2023 04:27) (78 - 122)  BP: 134/63 (07 Dec 2023 04:27) (131/59 - 152/78)  BP(mean): 91 (07 Dec 2023 04:27) (85 - 107)  RR: 17 (07 Dec 2023 04:27) (16 - 18)  SpO2: 97% (07 Dec 2023 04:27) (96% - 98%)    Parameters below as of 07 Dec 2023 04:27  Patient On (Oxygen Delivery Method): room air        I&O's Summary    06 Dec 2023 07:01  -  07 Dec 2023 07:00  --------------------------------------------------------  IN: 1970 mL / OUT: 2035 mL / NET: -65 mL        Physical Exam:  General Appearance: Appears well, NAD  Pulmonary: Nonlabored breathing, no respiratory distress  Cardiovascular: NSR  Abdomen: Soft, NT, ND  Breast: incisions cdi, x4 JPs w/ serosang output, bra in place  Extremities: WWP, SCD's in place     LABS:                        8.6    7.36  )-----------( 190      ( 06 Dec 2023 05:30 )             25.8     12-06    137  |  105  |  17  ----------------------------<  167<H>  4.7   |  25  |  0.87    Ca    8.6      06 Dec 2023 05:30  Phos  3.6     12-06  Mg     1.7     12-06      PT/INR - ( 05 Dec 2023 19:37 )   PT: 12.3 sec;   INR: 1.08          PTT - ( 05 Dec 2023 19:37 )  PTT:25.3 sec  Urinalysis Basic - ( 06 Dec 2023 05:30 )    Color: x / Appearance: x / SG: x / pH: x  Gluc: 167 mg/dL / Ketone: x  / Bili: x / Urobili: x   Blood: x / Protein: x / Nitrite: x   Leuk Esterase: x / RBC: x / WBC x   Sq Epi: x / Non Sq Epi: x / Bacteria: x

## 2023-12-07 NOTE — PROGRESS NOTE ADULT - SUBJECTIVE AND OBJECTIVE BOX
PLASTIC SURGERY PROGRESS NOTE    STATUS POST:    12/5: p b/l mastectomy w/ SLNB (general surgery), with b/l tissue expander insertion w/ alloderm (plastic surgery)  12/5: RTOR for hematoma evac with 250cc clotted blood, no active bleeding     POST OPERATIVE DAY #: 2    SUBJECTIVE:  Patient seen and examined by chief resident and team on AM rounds. Patient reports feeling well overall, reports some mild breast pain, will optimize pain regimen. No other symptoms at this time.     MEDICATIONS  (STANDING):    MEDICATIONS  (PRN):  diazepam    Tablet 2 milliGRAM(s) Oral every 8 hours PRN Muscle spasm  ondansetron Injectable 4 milliGRAM(s) IV Push every 6 hours PRN Nausea and/or Vomiting  oxyCODONE    IR 5 milliGRAM(s) Oral every 6 hours PRN Severe Pain (7 - 10)  oxyCODONE    IR 2.5 milliGRAM(s) Oral every 6 hours PRN Moderate Pain (4 - 6)      Vital Signs Last 24 Hrs  T(C): 36.6 (07 Dec 2023 04:37), Max: 37.1 (06 Dec 2023 21:45)  T(F): 97.8 (07 Dec 2023 04:37), Max: 98.8 (06 Dec 2023 21:45)  HR: 80 (07 Dec 2023 04:27) (78 - 122)  BP: 134/63 (07 Dec 2023 04:27) (131/59 - 152/78)  BP(mean): 91 (07 Dec 2023 04:27) (85 - 107)  RR: 17 (07 Dec 2023 04:27) (16 - 18)  SpO2: 97% (07 Dec 2023 04:27) (96% - 98%)    Parameters below as of 07 Dec 2023 04:27  Patient On (Oxygen Delivery Method): room air        PHYSICAL EXAM:  Gen: Appears well, NAD  HEENT: MMM  Chest: Bilateral breasts soft, appropriately tender, predominantly on left. Incisions c/d/i. Minimal ecchymosis on left breast along superior margin of incision. (+) L sided chemoport in place. BRADY x 4 draining SS fluids.   Pulmonary: Nonlabored breathing, no respiratory distress  Cardiovascular: NSR  Abdomen: Soft, NTND abdomen. No rebound or guarding.   Extremities: WWP, SCD's in place       I&O's Detail    06 Dec 2023 07:01  -  07 Dec 2023 07:00  --------------------------------------------------------  IN:    IV PiggyBack: 150 mL    Lactated Ringers: 1320 mL    Oral Fluid: 500 mL  Total IN: 1970 mL    OUT:    Bulb (mL): 50 mL    Bulb (mL): 45 mL    Bulb (mL): 85 mL    Bulb (mL): 105 mL    Voided (mL): 1750 mL  Total OUT: 2035 mL    Total NET: -65 mL          LABS:                        8.6    7.36  )-----------( 190      ( 06 Dec 2023 05:30 )             25.8     12-06    137  |  105  |  17  ----------------------------<  167<H>  4.7   |  25  |  0.87    Ca    8.6      06 Dec 2023 05:30  Phos  3.6     12-06  Mg     1.7     12-06      PT/INR - ( 05 Dec 2023 19:37 )   PT: 12.3 sec;   INR: 1.08          PTT - ( 05 Dec 2023 19:37 )  PTT:25.3 sec  Urinalysis Basic - ( 06 Dec 2023 05:30 )    Color: x / Appearance: x / SG: x / pH: x  Gluc: 167 mg/dL / Ketone: x  / Bili: x / Urobili: x   Blood: x / Protein: x / Nitrite: x   Leuk Esterase: x / RBC: x / WBC x   Sq Epi: x / Non Sq Epi: x / Bacteria: x        RADIOLOGY & ADDITIONAL STUDIES:

## 2023-12-07 NOTE — DISCHARGE NOTE PROVIDER - NSDCFUADDINST_GEN_ALL_CORE_FT
Follow up with Dr. Roy in 1-2 weeks. Call the office at [phone] to schedule your appointment. You may shower; soap and water over incision sites. Do not scrub. Pat dry when done. No tub bathing or swimming until cleared. Keep incision sites out of the sun as scars will darken. Ambulate as tolerated, but no heavy lifting (>10lbs) or strenuous exercise. You may resume regular diet. You should be urinating at least 3-4x per day. Call the office if you experience increasing abdominal pain, nausea, vomiting, or temperature >101 F.     Please follow up with Dr. Phelan in 1-2 weeks. You can call the office at [phone] to schedule an appointment.    Medications:  Home meds?  You should take 1000mg of Tylenol every 6 hours, as needed for mild-moderate pain. Do not exceed 4000mg in a day.  Oxy?  Colace?  Muscle relaxant?    Please call your doctor if you experience any of the following:  -New chest pain, pressure, squeezing or tightness.  -New or worsening cough, shortness of breath or wheeze.  -If you are vomiting and cannot keep down medications.  -You see blood or dark black material when vomiting or moving your bowels.  -You experience burning when you urinate, blood in your urine, or experience discharge.  -Your pain is getting worse changes location or moves to your chest or back.  -You have shaking, chills, or fever greater than 100.5F or 38C.  -Any changes in your symptoms that concern you.  Follow up with Dr. Roy in 1-2 weeks. Call the office at [phone] to schedule your appointment. You may shower; soap and water over incision sites. Do not scrub. Pat dry when done. No tub bathing or swimming until cleared. Keep incision sites out of the sun as scars will darken. Ambulate as tolerated, but no heavy lifting (>10lbs) or strenuous exercise. You may resume regular diet. You should be urinating at least 3-4x per day. Call the office if you experience increasing abdominal pain, nausea, vomiting, or temperature >101 F.     Please follow up with Dr. Phelan in 1-2 weeks. You can call the office at [phone] to schedule an appointment.    Medications:  Home meds?  You should take 1000mg of Tylenol every 6 hours, as needed for mild-moderate pain. Do not exceed 4000mg in a day.  Oxy?  Colace?  Muscle relaxant?    Please call your doctor if you experience any of the following:  -New chest pain, pressure, squeezing or tightness.  -New or worsening cough, shortness of breath or wheeze.  -If you are vomiting and cannot keep down medications.  -You see blood or dark black material when vomiting or moving your bowels.  -You experience burning when you urinate, blood in your urine, or experience discharge.  -Your pain is getting worse changes location or moves to your chest or back.  -You have shaking, chills, or fever greater than 100.5F or 38C.  -Any changes in your symptoms that concern you.  Follow up with Dr. Roy in 1-2 weeks. Call the office at [phone] to schedule your appointment. You may shower; soap and water over incision sites. Do not scrub. Pat dry when done. No tub bathing or swimming until cleared. Keep incision sites out of the sun as scars will darken. Ambulate as tolerated, but no heavy lifting (>10lbs) or strenuous exercise. You may resume regular diet. You should be urinating at least 3-4x per day. Call the office if you experience increasing abdominal pain, nausea, vomiting, or temperature >101 F.     Please follow up with Dr. Phelan in 1-2 weeks. You can call the office at [phone] to schedule an appointment.    Medications:  Home meds?  You should take Keflex (antibiotic), 1 tablet every 8 hours, for 7 days.  You should take 1000mg of Tylenol every 6 hours, as needed for mild-moderate pain. Do not exceed 4000mg in a day.  Oxy?  Colace?  Muscle relaxant?    Please call your doctor if you experience any of the following:  -New chest pain, pressure, squeezing or tightness.  -New or worsening cough, shortness of breath or wheeze.  -If you are vomiting and cannot keep down medications.  -You see blood or dark black material when vomiting or moving your bowels.  -You experience burning when you urinate, blood in your urine, or experience discharge.  -Your pain is getting worse changes location or moves to your chest or back.  -You have shaking, chills, or fever greater than 100.5F or 38C.  -Any changes in your symptoms that concern you.  Follow up with Dr. Roy in 1-2 weeks. Call the office at [phone] to schedule your appointment. You may shower; soap and water over incision sites. Do not scrub. Pat dry when done. No tub bathing or swimming until cleared. Keep incision sites out of the sun as scars will darken. Ambulate as tolerated, but no heavy lifting (>10lbs) or strenuous exercise. You may resume regular diet. You should be urinating at least 3-4x per day. Call the office if you experience increasing abdominal pain, nausea, vomiting, or temperature >101 F.     Please follow up with Dr. Phelan in 1-2 weeks. You can call the office at [phone] to schedule an appointment.    Medications:  Home meds?  You should take Keflex (antibiotic), 1 tablet every 8 hours, for 7 days.  You should take 1000mg of Tylenol every 6 hours, as needed for mild-moderate pain. Do not exceed 4000mg in a day.  Oxy?  Colace?  Muscle relaxant?    Please call your doctor if you experience any of the following:  -New chest pain, pressure, squeezing or tightness.  -New or worsening cough, shortness of breath or wheeze.  -If you are vomiting and cannot keep down medications.  -You see blood or dark black material when vomiting or moving your bowels.  -You experience burning when you urinate, blood in your urine, or experience discharge.  -Your pain is getting worse changes location or moves to your chest or back.  -You have shaking, chills, or fever greater than 100.5F or 38C.  -Any changes in your symptoms that concern you.

## 2023-12-07 NOTE — PATIENT PROFILE ADULT - FALL HARM RISK - HARM RISK INTERVENTIONS
Assistance with ambulation/Assistance OOB with selected safe patient handling equipment/Communicate Risk of Fall with Harm to all staff/Discuss with provider need for PT consult/Monitor gait and stability/Provide patient with walking aids - walker, cane, crutches/Reinforce activity limits and safety measures with patient and family/Sit up slowly, dangle for a short time, stand at bedside before walking/Tailored Fall Risk Interventions/Use of alarms - bed, chair and/or voice tab/Visual Cue: Yellow wristband and red socks/Bed in lowest position, wheels locked, appropriate side rails in place/Call bell, personal items and telephone in reach/Instruct patient to call for assistance before getting out of bed or chair/Non-slip footwear when patient is out of bed/[place] to call system/Physically safe environment - no spills, clutter or unnecessary equipment/Purposeful Proactive Rounding/Room/bathroom lighting operational, light cord in reach Assistance with ambulation/Assistance OOB with selected safe patient handling equipment/Communicate Risk of Fall with Harm to all staff/Discuss with provider need for PT consult/Monitor gait and stability/Provide patient with walking aids - walker, cane, crutches/Reinforce activity limits and safety measures with patient and family/Sit up slowly, dangle for a short time, stand at bedside before walking/Tailored Fall Risk Interventions/Use of alarms - bed, chair and/or voice tab/Visual Cue: Yellow wristband and red socks/Bed in lowest position, wheels locked, appropriate side rails in place/Call bell, personal items and telephone in reach/Instruct patient to call for assistance before getting out of bed or chair/Non-slip footwear when patient is out of bed/[place] to call system/Physically safe environment - no spills, clutter or unnecessary equipment/Purposeful Proactive Rounding/Room/bathroom lighting operational, light cord in reach

## 2023-12-07 NOTE — DISCHARGE NOTE PROVIDER - HOSPITAL COURSE
68 year old female with a past medical history of asthma and a past surgical history of a cholecystectomy, tonsillectomy, and chemoport placement. 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 She is now s/p a bilateral mastectomy w/ SLNB (by general surgery), with bilateral tissue expander insertion w/ alloderm (by plastic surgery) on 12/5. with RTOR for hematoma evac with 250cc clotted blood, no active bleeding on 12/5. Patient was found to have large amount of bloody output in her breast drains, as well as left breast edema. She was returned to the OR in the evening on 12/5 for the evacuation of a hematoma with 250cc of clotted blood. Patient tolerated the procedure well. Diet was advanced as tolerated and pain was well controlled on medication. On the day of discharge, the patient was deemed stable and ready to return home with a plan to follow up as an outpatient. She will go home with her breast drains.     68 year old female with a past medical history of asthma and a past surgical history of a cholecystectomy, tonsillectomy, and chemoport placement. She was recently diagnosed with right multifocal IDC (Stage IIA; [id number] - ER/AL negative, HER2+) and left DCIS (stage 0), s/p neoadjuvant chemotherapy, now presenting for surgical management. She is now s/p a bilateral mastectomy w/ SLNB (by general surgery), with bilateral tissue expander insertion w/ alloderm (by plastic surgery) on 12/5. with RTOR for hematoma evac with 250cc clotted blood, no active bleeding on 12/5. Patient was found to have large amount of bloody output in her breast drains, as well as left breast edema. She was returned to the OR in the evening on 12/5 for the evacuation of a hematoma with 250cc of clotted blood. Patient tolerated the procedure well. Diet was advanced as tolerated and pain was well controlled on medication. On the day of discharge, the patient was deemed stable and ready to return home with a plan to follow up as an outpatient. She will go home with her breast drains.

## 2023-12-08 ENCOUNTER — TRANSCRIPTION ENCOUNTER (OUTPATIENT)
Age: 68
End: 2023-12-08

## 2023-12-08 VITALS — TEMPERATURE: 98 F

## 2023-12-08 PROCEDURE — 80048 BASIC METABOLIC PNL TOTAL CA: CPT

## 2023-12-08 PROCEDURE — 85730 THROMBOPLASTIN TIME PARTIAL: CPT

## 2023-12-08 PROCEDURE — 76098 X-RAY EXAM SURGICAL SPECIMEN: CPT

## 2023-12-08 PROCEDURE — 88309 TISSUE EXAM BY PATHOLOGIST: CPT

## 2023-12-08 PROCEDURE — 88307 TISSUE EXAM BY PATHOLOGIST: CPT

## 2023-12-08 PROCEDURE — 85610 PROTHROMBIN TIME: CPT

## 2023-12-08 PROCEDURE — 84100 ASSAY OF PHOSPHORUS: CPT

## 2023-12-08 PROCEDURE — 83735 ASSAY OF MAGNESIUM: CPT

## 2023-12-08 PROCEDURE — 86900 BLOOD TYPING SEROLOGIC ABO: CPT

## 2023-12-08 PROCEDURE — C9399: CPT

## 2023-12-08 PROCEDURE — 36415 COLL VENOUS BLD VENIPUNCTURE: CPT

## 2023-12-08 PROCEDURE — 83605 ASSAY OF LACTIC ACID: CPT

## 2023-12-08 PROCEDURE — C1889: CPT

## 2023-12-08 PROCEDURE — 86901 BLOOD TYPING SEROLOGIC RH(D): CPT

## 2023-12-08 PROCEDURE — 85027 COMPLETE CBC AUTOMATED: CPT

## 2023-12-08 PROCEDURE — 86850 RBC ANTIBODY SCREEN: CPT

## 2023-12-08 PROCEDURE — 88305 TISSUE EXAM BY PATHOLOGIST: CPT

## 2023-12-08 PROCEDURE — C1789: CPT

## 2023-12-08 RX ORDER — DOCUSATE SODIUM 100 MG
1 CAPSULE ORAL
Qty: 6 | Refills: 0
Start: 2023-12-08 | End: 2023-12-10

## 2023-12-08 RX ORDER — OXYCODONE HYDROCHLORIDE 5 MG/1
1 TABLET ORAL
Qty: 3 | Refills: 0
Start: 2023-12-08

## 2023-12-08 RX ADMIN — Medication 1000 MILLIGRAM(S): at 00:35

## 2023-12-08 RX ADMIN — Medication 500 MILLIGRAM(S): at 05:30

## 2023-12-08 RX ADMIN — Medication 2 MILLIGRAM(S): at 05:54

## 2023-12-08 RX ADMIN — Medication 500 MILLIGRAM(S): at 14:16

## 2023-12-08 RX ADMIN — Medication 1000 MILLIGRAM(S): at 06:30

## 2023-12-08 RX ADMIN — OXYCODONE HYDROCHLORIDE 5 MILLIGRAM(S): 5 TABLET ORAL at 14:16

## 2023-12-08 RX ADMIN — Medication 1000 MILLIGRAM(S): at 05:30

## 2023-12-08 NOTE — DISCHARGE NOTE NURSING/CASE MANAGEMENT/SOCIAL WORK - NSDCPEFALRISK_GEN_ALL_CORE
Patients  calling and requesting a Home Health Aid to help daily care for Pat.   For information on Fall & Injury Prevention, visit: [email or web address] OR  [email or web address] OR  https://www.cdc.gov/steadi/patient.html For information on Fall & Injury Prevention, visit: [email or web address] OR  [email or web address] OR  https://www.cdc.gov/steadi/patient.html

## 2023-12-08 NOTE — PROGRESS NOTE ADULT - ASSESSMENT
69yo F PMHx of asthma, PSHx of cholecystectomy, tonsillectomy, chemoport placement, recently diagnosed R. multifocal IDC (Stage IIA; [id number] - ER/NE negative, HER2+), L. DCIS (stage 0), s/p neoadjuvant chemotherapy, now presenting for surgical management. Now s/p b/l mastectomy w/ SLNB (general surgery), with b/l tissue expander insertion w/ alloderm (plastic surgery) (12/5). with RTOR for hematoma evac with 250cc clotted blood, no active bleeding on 12/5, plan to d/c today     Reg diet  Pain/nausea control prn  Home meds as appropriate  Valium prn for muscle spasm  JPx4 (2 left breast, 2 right - one inferior, one superior to expander)  SCDs, holding SQH  No am labs 67yo F PMHx of asthma, PSHx of cholecystectomy, tonsillectomy, chemoport placement, recently diagnosed R. multifocal IDC (Stage IIA; [id number] - ER/MO negative, HER2+), L. DCIS (stage 0), s/p neoadjuvant chemotherapy, now presenting for surgical management. Now s/p b/l mastectomy w/ SLNB (general surgery), with b/l tissue expander insertion w/ alloderm (plastic surgery) (12/5). with RTOR for hematoma evac with 250cc clotted blood, no active bleeding on 12/5, plan to d/c today     Reg diet  Pain/nausea control prn  Home meds as appropriate  Valium prn for muscle spasm  JPx4 (2 left breast, 2 right - one inferior, one superior to expander)  SCDs, holding SQH  No am labs

## 2023-12-08 NOTE — PROGRESS NOTE ADULT - SUBJECTIVE AND OBJECTIVE BOX
PLASTIC SURGERY PROGRESS NOTE    STATUS POST:    12/5: p b/l mastectomy w/ SLNB (general surgery), with b/l tissue expander insertion w/ alloderm (plastic surgery)  12/5: RTOR for hematoma evac with 250cc clotted blood, no active bleeding     POST OPERATIVE DAY #: 3    SUBJECTIVE:  Patient seen and examined by chief resident and team on AM rounds. Patient reports that she has been ambulating without difficulty and no longer complains of dizziness. Pain well controlled. VS wnl, BRADY x4 all with <50cc output/24 hrs, serosanguinous.     MEDICATIONS  (STANDING):  acetaminophen     Tablet .. 1000 milliGRAM(s) Oral every 6 hours  cephalexin 500 milliGRAM(s) Oral every 8 hours    MEDICATIONS  (PRN):  diazepam    Tablet 2 milliGRAM(s) Oral every 8 hours PRN Muscle spasm/breakthrough pain  ondansetron Injectable 4 milliGRAM(s) IV Push every 6 hours PRN Nausea and/or Vomiting  oxyCODONE    IR 2.5 milliGRAM(s) Oral every 6 hours PRN Moderate Pain (4 - 6)  oxyCODONE    IR 5 milliGRAM(s) Oral every 6 hours PRN Severe Pain (7 - 10)      Vital Signs Last 24 Hrs  T(C): 36.9 (08 Dec 2023 04:45), Max: 37.1 (07 Dec 2023 22:17)  T(F): 98.4 (08 Dec 2023 04:45), Max: 98.8 (07 Dec 2023 22:17)  HR: 86 (08 Dec 2023 05:24) (86 - 98)  BP: 153/65 (08 Dec 2023 05:24) (115/60 - 155/81)  BP(mean): 93 (08 Dec 2023 05:24) (77 - 111)  RR: 17 (08 Dec 2023 05:24) (17 - 18)  SpO2: 97% (08 Dec 2023 05:24) (97% - 100%)    Parameters below as of 08 Dec 2023 05:24  Patient On (Oxygen Delivery Method): room air    PHYSICAL EXAM:  Gen: Appears well, NAD  HEENT: MMM  Chest: Bilateral breasts soft, minimal but appropriately tender, predominantly on left. Incisions c/d/i. Minimal ecchymosis on left breast along superior margin of incision. (+) L sided chemoport in place. BRADY x 4 draining SS fluids.   Pulmonary: Nonlabored breathing, no respiratory distress  Cardiovascular: NSR  Abdomen: Soft, NTND abdomen. No rebound or guarding.   Extremities: WWP, SCD's in place       I&O's Detail    07 Dec 2023 07:01  -  08 Dec 2023 07:00  --------------------------------------------------------  IN:    Oral Fluid: 340 mL  Total IN: 340 mL    OUT:    Bulb (mL): 51 mL    Bulb (mL): 58 mL    Bulb (mL): 31 mL    Bulb (mL): 45 mL    Voided (mL): 1250 mL  Total OUT: 1435 mL    Total NET: -1095 mL          LABS:                        8.0    10.28 )-----------( 168      ( 07 Dec 2023 10:00 )             25.2     12-07    139  |  105  |  22  ----------------------------<  130<H>  4.0   |  27  |  0.91    Ca    8.4      07 Dec 2023 10:00  Phos  2.5     12-07  Mg     1.9     12-07        Urinalysis Basic - ( 07 Dec 2023 10:00 )    Color: x / Appearance: x / SG: x / pH: x  Gluc: 130 mg/dL / Ketone: x  / Bili: x / Urobili: x   Blood: x / Protein: x / Nitrite: x   Leuk Esterase: x / RBC: x / WBC x   Sq Epi: x / Non Sq Epi: x / Bacteria: x        RADIOLOGY & ADDITIONAL STUDIES:

## 2023-12-08 NOTE — PROGRESS NOTE ADULT - PROVIDER SPECIALTY LIST ADULT
Plastic Surgery
Surgery
Plastic Surgery
Surgery
Plastic Surgery
Surgery

## 2023-12-08 NOTE — PROGRESS NOTE ADULT - ASSESSMENT
67yo F PMHx of asthma, PSHx of cholecystectomy, tonsillectomy, chemoport placement, recently diagnosed R. multifocal IDC (Stage IIA; [id number] - ER/ID negative, HER2+), L. DCIS (stage 0), s/p neoadjuvant chemotherapy, now presenting for surgical management. Now s/p b/l mastectomy w/ SLNB (general surgery), with b/l tissue expander insertion w/ alloderm (plastic surgery) (12/5). with RTOR for hematoma evac with 250cc clotted blood, no active bleeding on 12/5. Patient recovering well.     Plan:  - patient feels well, plan for discharge today  Plan discussed with chief resident and attending, Dr. Phelan 67yo F PMHx of asthma, PSHx of cholecystectomy, tonsillectomy, chemoport placement, recently diagnosed R. multifocal IDC (Stage IIA; [id number] - ER/GA negative, HER2+), L. DCIS (stage 0), s/p neoadjuvant chemotherapy, now presenting for surgical management. Now s/p b/l mastectomy w/ SLNB (general surgery), with b/l tissue expander insertion w/ alloderm (plastic surgery) (12/5). with RTOR for hematoma evac with 250cc clotted blood, no active bleeding on 12/5. Patient recovering well.     Plan:  - patient feels well, plan for discharge today  Plan discussed with chief resident and attending, Dr. Phelan

## 2023-12-08 NOTE — DISCHARGE NOTE NURSING/CASE MANAGEMENT/SOCIAL WORK - PATIENT PORTAL LINK FT
You can access the FollowMyHealth Patient Portal offered by [facility] by registering at the following website: [email or web address]. By joining [employer]’s FollowMyHealth portal, you will also be able to view your health information using other applications (apps) compatible with our system. You can access the FollowMyHealth Patient Portal offered by [facility] by registering at the following website: [email or web address]. By joining [employer]’s FollowMyHealth portal, you will also be able to view your health information using other applications (apps) compatible with our system.

## 2023-12-08 NOTE — PROGRESS NOTE ADULT - SUBJECTIVE AND OBJECTIVE BOX
STATUS POST:  Bilateral mastectomy with sentinel node biopsy    Bilateral mastectomy with sentinel node biopsy    Bilateral mastectomy with insertion of tissue expander on both sides    Evacuation of hematoma of chest    Evacuation of hematoma of left breast    Removal, tissue expander, breast    Evacuation of hematoma of chest    Evacuation of hematoma of left breast      SUBJECTIVE: Pt seen and examined at the bedside by surgical team.      Vital Signs Last 24 Hrs  T(C): 36.9 (08 Dec 2023 04:45), Max: 37.1 (07 Dec 2023 22:17)  T(F): 98.4 (08 Dec 2023 04:45), Max: 98.8 (07 Dec 2023 22:17)  HR: 86 (08 Dec 2023 05:24) (86 - 98)  BP: 153/65 (08 Dec 2023 05:24) (115/60 - 155/81)  BP(mean): 93 (08 Dec 2023 05:24) (77 - 111)  RR: 17 (08 Dec 2023 05:24) (17 - 18)  SpO2: 97% (08 Dec 2023 05:24) (97% - 100%)    Parameters below as of 08 Dec 2023 05:24  Patient On (Oxygen Delivery Method): room air        I&O's Summary    06 Dec 2023 07:01  -  07 Dec 2023 07:00  --------------------------------------------------------  IN: 1970 mL / OUT: 2035 mL / NET: -65 mL    07 Dec 2023 07:01  -  08 Dec 2023 06:14  --------------------------------------------------------  IN: 340 mL / OUT: 1435 mL / NET: -1095 mL        Physical Exam:  General Appearance: Appears well, NAD  Pulmonary: Nonlabored breathing, no respiratory distress  Cardiovascular: NSR  Abdomen: Soft, nondisteded, NT  Breast:   Extremities: WWP, SCD's in place     LABS:                        8.0    10.28 )-----------( 168      ( 07 Dec 2023 10:00 )             25.2     12-07    139  |  105  |  22  ----------------------------<  130<H>  4.0   |  27  |  0.91    Ca    8.4      07 Dec 2023 10:00  Phos  2.5     12-07  Mg     1.9     12-07        Urinalysis Basic - ( 07 Dec 2023 10:00 )    Color: x / Appearance: x / SG: x / pH: x  Gluc: 130 mg/dL / Ketone: x  / Bili: x / Urobili: x   Blood: x / Protein: x / Nitrite: x   Leuk Esterase: x / RBC: x / WBC x   Sq Epi: x / Non Sq Epi: x / Bacteria: x       STATUS POST:  Bilateral mastectomy with sentinel node biopsy    Bilateral mastectomy with sentinel node biopsy    Bilateral mastectomy with insertion of tissue expander on both sides    Evacuation of hematoma of chest    Evacuation of hematoma of left breast    Removal, tissue expander, breast    Evacuation of hematoma of chest    Evacuation of hematoma of left breast      SUBJECTIVE: Pt seen and examined at the bedside by surgical team. Reports overall feeling better, no acute complaints at this time      Vital Signs Last 24 Hrs  T(C): 36.9 (08 Dec 2023 04:45), Max: 37.1 (07 Dec 2023 22:17)  T(F): 98.4 (08 Dec 2023 04:45), Max: 98.8 (07 Dec 2023 22:17)  HR: 86 (08 Dec 2023 05:24) (86 - 98)  BP: 153/65 (08 Dec 2023 05:24) (115/60 - 155/81)  BP(mean): 93 (08 Dec 2023 05:24) (77 - 111)  RR: 17 (08 Dec 2023 05:24) (17 - 18)  SpO2: 97% (08 Dec 2023 05:24) (97% - 100%)    Parameters below as of 08 Dec 2023 05:24  Patient On (Oxygen Delivery Method): room air        I&O's Summary    06 Dec 2023 07:01  -  07 Dec 2023 07:00  --------------------------------------------------------  IN: 1970 mL / OUT: 2035 mL / NET: -65 mL    07 Dec 2023 07:01  -  08 Dec 2023 06:14  --------------------------------------------------------  IN: 340 mL / OUT: 1435 mL / NET: -1095 mL    Physical Exam:  General Appearance: Appears well, NAD  Pulmonary: Nonlabored breathing, no respiratory distress  Cardiovascular: NSR  Abdomen: Soft, NT, ND  Breast: incisions cdi, x4 JPs w/ serosang output, bra in place  Extremities: WWP, SCD's in place     LABS:                        8.0    10.28 )-----------( 168      ( 07 Dec 2023 10:00 )             25.2     12-07    139  |  105  |  22  ----------------------------<  130<H>  4.0   |  27  |  0.91    Ca    8.4      07 Dec 2023 10:00  Phos  2.5     12-07  Mg     1.9     12-07        Urinalysis Basic - ( 07 Dec 2023 10:00 )    Color: x / Appearance: x / SG: x / pH: x  Gluc: 130 mg/dL / Ketone: x  / Bili: x / Urobili: x   Blood: x / Protein: x / Nitrite: x   Leuk Esterase: x / RBC: x / WBC x   Sq Epi: x / Non Sq Epi: x / Bacteria: x

## 2023-12-11 ENCOUNTER — NON-APPOINTMENT (OUTPATIENT)
Age: 68
End: 2023-12-11

## 2023-12-12 LAB
SURGICAL PATHOLOGY STUDY: SIGNIFICANT CHANGE UP
SURGICAL PATHOLOGY STUDY: SIGNIFICANT CHANGE UP

## 2023-12-14 ENCOUNTER — APPOINTMENT (OUTPATIENT)
Dept: PLASTIC SURGERY | Facility: CLINIC | Age: 68
End: 2023-12-14
Payer: MEDICARE

## 2023-12-14 ENCOUNTER — NON-APPOINTMENT (OUTPATIENT)
Age: 68
End: 2023-12-14

## 2023-12-14 PROBLEM — C50.919 MALIGNANT NEOPLASM OF UNSPECIFIED SITE OF UNSPECIFIED FEMALE BREAST: Chronic | Status: ACTIVE | Noted: 2023-12-05

## 2023-12-14 PROBLEM — M19.90 UNSPECIFIED OSTEOARTHRITIS, UNSPECIFIED SITE: Chronic | Status: ACTIVE | Noted: 2023-12-04

## 2023-12-14 PROCEDURE — 99024 POSTOP FOLLOW-UP VISIT: CPT

## 2023-12-14 NOTE — HISTORY OF PRESENT ILLNESS
[FreeTextEntry1] : 69 y/o female presents 9 days s/p bilateral tissue expander reconstruction and return to OR POD0 for hematoma evacuation.  Denies any f/n/v. Patient stopped taking oxycodone on Tuesday because it was making her constipated. Patient has 4 BRADY drains.  PE: Incisions c/d/i, no collections or s/sx of infection, 4 BRADY drains in place --> serosang fluid, drain 2 and drain 4 removed, left breast mound larger than right  After localizing the port of the tissue expander with the magnet, I prepped the area with chloroprep and then using a 21-gauge butterfly needle, I percutaneously accessed the port of the expander and injected 100 cc on the right and 50 cc on the left of injectable saline into the expander.  Totals: Right: 300 + 100 m= 400 cc Left: 300 + 50 = 350 cc  These are 500 cc expanders.  Shower ok  Sleep on back RTC in 1 week for drain removal and repeat expansion

## 2023-12-15 ENCOUNTER — APPOINTMENT (OUTPATIENT)
Dept: BREAST CENTER | Facility: CLINIC | Age: 68
End: 2023-12-15
Payer: MEDICARE

## 2023-12-15 VITALS
OXYGEN SATURATION: 97 % | HEIGHT: 66 IN | DIASTOLIC BLOOD PRESSURE: 67 MMHG | WEIGHT: 175 LBS | HEART RATE: 85 BPM | BODY MASS INDEX: 28.12 KG/M2 | SYSTOLIC BLOOD PRESSURE: 109 MMHG

## 2023-12-15 PROCEDURE — 99024 POSTOP FOLLOW-UP VISIT: CPT

## 2023-12-15 NOTE — DATA REVIEWED
[FreeTextEntry1] : 4/13/23 ([facility]) B/l screening mammo: scattered fibroglandular density. There is a highly suspicious spiculated mass with calcifications in between 6 and 7:00 in the right breast 5 cm from the nipple (annotated) for which spot magnification CC/ML and full-field lateral views with tomosynthesis and ultrasound are recommended.  There is a smaller mass in the more posterior 7:00 right breast 11 cm from the nipple (annotated) for which spot compression CC/MLO and ultrasound are recommended.  There is a developing mass/asymmetry in the superior aspect of the right breast on the MLO view 6 cm from the nipple (annotated) for which a spot compression MLO view and ultrasound are recommended.  Pleomorphic calcifications have developed in the middle to posterior right breast from 5:00 to 7:00 highly suspicious for DCIS (annotated) for which additional magnification CC/ML views are recommended. The left breast demonstrates no suspicious masses, calcifications, skin thickening or areas of distortion. BI-RADS 0.   4/26/23 ([facility]) right diag mammo and US: scattered fibroglandular density. 1. Right lower outer quadrant suspicious masses, ultrasound-guided biopsy, 2 sites to demonstrate at least multifocal disease.  2. Right lower inner quadrant fine pleomorphic calcifications, stereotactic guided, 1 site recommended, to evaluate for multicentric disease. BI-RADS 5.   5/16/23 ([facility]) 1. Breast, right, 6:30 4 cm FN, 2.2 cm spiculated mass; ultrasound-guided biopsy: - Invasive ductal carcinoma, poorly-differentiated associated with necrosis, measuring at least 12 mm in this material (see note) 2. Breast, right, 8:00 10 cm FN, 0.9 cm solid mass; ultrasound-guided biopsy: - Invasive ductal carcinoma, poorly-differentiated associated with mild lymphoid infiltrate, measuring at least 7 mm in this material, morphologically similar to part 1 3. 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 The findings are compatible with multicentric breast cancer involving at least 2 quadrants over 9 cm region. There are additional highly suspicious microcalcifications identified between the calcifications sampled and the 8 o'clock axis posterior mass. Appropriate action should be taken for the multi centric breast cancer.   6/2/23 ([facility]) b/l breast MRI: multicentric right breast malignancy, appearance of the MRI closely approximates appearance on post-biopsy imaging either the sites of concern spanned 8.7 x .3 x 8.1cm, dominant tumor 5-6:00 axis abuts but does not demonstrate skin involvement. No adenopathy. 2 indeterminate findings in the left breast 2:00 and 6:00 recommend 2 site left breast MRI biopsy. BIRADS 4   6/14/23 ([facility]) MR biopsy x2: 1. The left 6:00 axis 6 mm enhancing mass yielded DCIS, solid type with intermediate nuclear grade. Pathology results indicate that the specimen is malignant and concordant with imaging. This finding is delineated by an hourglass shaped clip which is medially displaced. ADDENDUM: Estrogen receptor 100% 3+ POSITIVE; Progesterone receptor INSUFFICIENT TISSUE 2. The pathology of the left 9:30 axis 6 mm clumped nonmass enhancement yielded intraductal papilloma with atypia. Pathology results indicate that this specimen is high risk and concordant with imaging. If breast conservation is performed for the left breast DCIS then surgical excisional biopsy would be recommended for this finding. **In addition, given the patient's diagnosis of left breast DCIS, MR guided core biopsy of the linear nonmass enhancement within the left medial posterior breast can be attempted, if breast conservation is desired. If this is not amenable to MR guided core biopsy due to its medial posterior location, then clip placement can be performed for possible needle localization and excision at the time of the left breast lumpectomy for DCIS and excisional biopsy for atypical papilloma.  [facility] 1 Left breast medial posterior, MRI guided core biopsies: Fibroadenoma. Fibrocystic changes including reactive changes most likely due to cyst rupture. IMPRESSION: These results are benign and concordant. RECOMMENDATION: Surgical or oncologic management.  10/5/2023 ([facility]) bilateral breast MRI: Complete imaging response to neoadjuvant chemotherapy.  Unremarkable left breast.  BI-RADS 6.  12/5/23 ([facility]) 1 Right breast mastectomy:- No residual carcinoma identified.- Benign breast tissue with three separate areas of densely fibrotic breast tissue with biopsy site changes, consistent with tumor bed, associated with calcifications.- Unremarkable skin and nipple. 2 Right axillary tail, excision:- Benign fibroadipose tissue. 3 Right axillary sentinel lymph nodes, biopsy:- Two lymph nodes, negative for tumor (0/2). 4 Left axillary lymph node, biopsy:- One lymph node, negative for tumor (0/1). 5 Right breast skin short superior long lateral, excision:- Unremarkable skin and subcutis. 6 Left Breast Mastectomy:- No residual carcinoma identified.- Benign breast tissue with two separate biopsy sites.- Focal atypical duct hyperplasia involving intraductal papilloma.- Unremarkable skin and nipple. 7 Left axillary tail, excision: - Benign adipose tissue. pT0pN0

## 2023-12-15 NOTE — HISTORY OF PRESENT ILLNESS
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 Brandee Marsh. Patient met with Dr. Montgomery (radiation oncology) on 10/18/2023 preoperatively to discuss role of adjuvant XRT in an effort to better understand her surgical options. Patient ultimately elected to proceed with bilateral mastectomies she is status post bilateral mastectomies, right SLNB and left mag trace, with bilateral tissue expander based reconstruction with Dr. Phelan on 12/5/2023 final surgical pathology yielded no residual carcinoma identified benign breast tissue with 3 separate areas of densely fibrotic breast tissue with biopsy site changes which is consistent with tumor bed, 0/2 right axillary lymph nodes negative for tumor and no residual carcinoma identified in the left breast focal atypical ductal hyperplasia involving intraductal papilloma, 0/1 left axillary lymph node negative for tumor. pT0pN0. Patient is doing well overall, she had a postop evaluation with Dr. Phelan on 12/14/2023 for wound care management. Surgical pathology returned as seen below. I explained the results to the patient and provided them with a copy of the report. The pt is doing well with minimal pain at the surgical site. No fevers or chills. They understand that they will have to follow-up with medical oncology for discussion of adjuvant treatment.  Of note, I discussed the patient's pathology with Dr. Montgomery from radiation oncology.  Since she underwent bilateral mastectomy and she appears to have a pathologic complete response on surgical pathology, he does not recommend any adjuvant radiation treatment.  He will follow-up after reviewing the pathology results.  Of note patient's cancer was found on bilateral screening mammogram on 4/13/2023 (she missed screening imaging in 2022 due to an illness in the family). Subsequent workup revealed IDC (ER/NH negative HER2 positive) at a 2.2cm mass at 6:30n4, a 0.9cm mass at 8n10, and microinvasive carcinoma with DCIS at 5:00-6:00, highly suspicious calcifications were identified between the calcifications sampled and the 8:00 posterior mass. The findings are compatible with multicentric breast cancer involving at least 2 quadrants and spanning over 9 cm. Patient completed a breast MRI on 6/2/23, revealing extensive disease on the right breast with the dominant tumor abutting the skin, and two sites of suspicion on the left breast recommended for MRI biopsies. The biopsies were completed on 6/14/23, the left 6n6 mass yielded DCIS (ER+ NH insufficient tissue) and the left 9:30n6 mass yielded an intraductal papilloma with atypia. Of note, the area biopsied with the DCIS has a clip that had migrated about 4 cm away. An additional left MRI biopsy was recommended and completed on 6/27/23, which revealed a fibroadenoma, benign and concordant. Patient completed neoadjuvant chemotherapy in September 2023 she underwent a bilateral breast MRI for post neoadjuvant chemotherapy assessment which showed complete imaging response no enhancement in either breast no axillary adenopathy bilaterally  Other significant medical history: Patient was previously followed by a cardiologist for CAD, has not seen a cardiologist in 2018. Patient was previously seen by a neurologist for workup of possible brain aneurysm, but was not deemed an aneurysm and was discharged from their care.

## 2023-12-15 NOTE — PLAN
[TextEntry] : Patient to continue to follow-up with medical oncology Patient to continue to follow-up with plastic surgery Patient to follow-up in 3 months for clinical breast exam

## 2023-12-15 NOTE — PHYSICAL EXAM
[Normocephalic] : normocephalic [EOMI] : extra ocular movement intact [Supple] : supple [Examined in the supine and seated position] : examined in the supine and seated position [No Axillary Lymphadenopathy] : no left axillary lymphadenopathy [No Rashes] : no rashes [No Supraclavicular Adenopathy] : no supraclavicular adenopathy [No dominant masses] : no dominant masses in right breast  [No dominant masses] : no dominant masses left breast [de-identified] : B/L incisions C/D/I, BRAYD drain in place with minimal serosanguinous fluid [de-identified] : Well-healed mastectomy incision [de-identified] : Well-healed mastectomy incision

## 2023-12-15 NOTE — REASON FOR VISIT
[Post Op: _________] : a [unfilled] post op visit [FreeTextEntry1] : s/p B/L mastectomy on 12/5/23 for R IDC and L DCIS

## 2023-12-15 NOTE — ASSESSMENT
[FreeTextEntry1] : 68-year-old female s/p b/l mastectomy 12/5/23 with right breast multicentric invasive ductal carcinoma, ER negative, DC negative, HER2 positive and left breast DCIS and intraductal papilloma status post neoadjuvant chemotherapy with a complete imaging response

## 2023-12-18 NOTE — BRIEF OPERATIVE NOTE - NSICDXBRIEFOPLAUNCH_GEN_ALL_CORE
VOICEMAIL  1. Caller Name: Mary Martinez                       Call Back Number: [phone]     2. Message: Pain medication was suppose to be filled on Friday and her medication refill was not at the pharmacy - Oxycodone-Actaminaphen  mg     3. Patient approves office to leave a detailed voicemail/MyChart message: N\A  
<--- Click to Launch ICDx for PreOp, PostOp and Procedure

## 2023-12-21 ENCOUNTER — APPOINTMENT (OUTPATIENT)
Dept: PLASTIC SURGERY | Facility: CLINIC | Age: 68
End: 2023-12-21
Payer: MEDICARE

## 2023-12-21 PROCEDURE — 99024 POSTOP FOLLOW-UP VISIT: CPT

## 2023-12-21 NOTE — HISTORY OF PRESENT ILLNESS
[FreeTextEntry1] : 69 y/o female presents 16 days s/p bilateral tissue expander reconstruction and return to OR POD0 for hematoma evacuation on 12/05/2023.  Denies any f/n/v. Patient is not taking any pain medications. Patient has 2 BRADY drains.  PE: Incisions c/d/i, no collections or s/sx of infection, last two JPs removed  left breast mound larger than right though both incisions well healed with good cleavage After localizing the port of the tissue expander with the magnet, I prepped the area with chloroprep and then using a 21-gauge butterfly needle, I percutaneously accessed the port of the expander and injected 100 cc on the right and 100 cc on the left of injectable saline into the expander.  Totals: Right: 20454 cc Left: 17844 cc  These are 500 cc expanders.   Shower ok  Sleep on back RTC in 1 month to discuss next stage revision surgery. She will not need RT so we can proceed with the revision mid March or after.

## 2023-12-27 ENCOUNTER — APPOINTMENT (OUTPATIENT)
Dept: HEMATOLOGY ONCOLOGY | Facility: CLINIC | Age: 68
End: 2023-12-27
Payer: MEDICARE

## 2023-12-27 ENCOUNTER — LABORATORY RESULT (OUTPATIENT)
Age: 68
End: 2023-12-27

## 2023-12-27 ENCOUNTER — NON-APPOINTMENT (OUTPATIENT)
Age: 68
End: 2023-12-27

## 2023-12-27 VITALS
TEMPERATURE: 98.1 F | OXYGEN SATURATION: 97 % | SYSTOLIC BLOOD PRESSURE: 118 MMHG | WEIGHT: 182 LBS | RESPIRATION RATE: 18 BRPM | BODY MASS INDEX: 29.25 KG/M2 | HEART RATE: 86 BPM | DIASTOLIC BLOOD PRESSURE: 74 MMHG | HEIGHT: 66 IN

## 2023-12-27 PROCEDURE — 36415 COLL VENOUS BLD VENIPUNCTURE: CPT

## 2023-12-27 PROCEDURE — 99214 OFFICE O/P EST MOD 30 MIN: CPT | Mod: 25

## 2023-12-27 RX ORDER — OXYCODONE AND ACETAMINOPHEN 5; 325 MG/1; MG/1
5-325 TABLET ORAL
Qty: 20 | Refills: 0 | Status: DISCONTINUED | COMMUNITY
Start: 2023-11-29 | End: 2023-12-27

## 2023-12-27 RX ORDER — DIPHENOXYLATE HYDROCHLORIDE AND ATROPINE SULFATE 2.5; .025 MG/1; MG/1
2.5-0.025 TABLET ORAL
Qty: 30 | Refills: 0 | Status: DISCONTINUED | COMMUNITY
Start: 2023-07-19 | End: 2023-12-27

## 2023-12-27 NOTE — HISTORY OF PRESENT ILLNESS
[de-identified] : Here for f/u. Reports ongoing fatigue and b/l neuropathy in hands and feet. Reports b/l breast soreness s/p surgery 12/5. Otherwise feeling ok.  [FreeTextEntry1] : Patient is a 67yo woman with h/o CAD (seen by cardiology in past for ?tachycardia as per patient but w/o follow up) referred by Dr. Roy for a newly diagnosed multicentric right breast invasive ductal carcinoma ER/AZ negative, HER-2 positive. Here today for f/u.   Patient presents to initial visit on 6/5/23 with her aunt.   The patient's history began when she underwent bilateral screening mammogram on 4/13/2023.  Of note, the patient's last breast imaging was in 2021. She states that she was dealing with illness in her mother and subsequent death of her mother in 2022.  She was found to have scattered fibroglandular density.  There was a spiculated mass with calcifications at the 6 to 7 o'clock position 5 cm from the nipple.  There was a smaller mass in the more posterior 7 o'clock position 11 cm from the nipple.  There was an asymmetry in the right breast 6 cm from the nipple.  There was also pleomorphic calcifications in the mid to posterior right breast for which additional imaging was recommended.  The patient underwent right breast diagnostic mammogram and sonogram on 4/26/2023.  She was found to have the findings described above for which biopsy was recommended of all the sites.  The patient underwent right breast 6:00 biopsy of the 2.2 cm spiculated mass which returned as invasive ductal carcinoma, poorly differentiated, ER negative, AZ negative, HER2 positive.  She also underwent right breast ultrasound-guided biopsy of the 8:00 0.9 cm mass.  Pathology returned as invasive ductal carcinoma, ER negative, AZ negative, HER2 positive.  At the right breast 5 o'clock position stereotactic core biopsy was performed of the calcifications which returned as a microinvasive carcinoma with DCIS, high nuclear grade. Of note, there are highly suspicious calcifications identified between the calcifications sampled and the 8:00 posterior mass.  The findings are compatible with multicentric breast cancer involving at least 2 quadrants and spanning over 9 cm.    MRI breast on 6/2/23 showed extensive disease in the right breast with the dominant tumor abutting the skin, and two sites of suspicion on the left breast recommended for MRI biopsies. Area of skin abutting the tumor will be marked by Dr Montgomery from radiation oncology as per Dr Roy prior to pt starting chemo.  10/5/23 MR BREAST Complete imaging response to neoadjuvant chemotherapy RIGHT breast for multicentric breast cancer. Unremarkable LEFT breast. Treatment planning is in progress.

## 2023-12-27 NOTE — PHYSICAL EXAM
[Restricted in physically strenuous activity but ambulatory and able to carry out work of a light or sedentary nature] : Status 1- Restricted in physically strenuous activity but ambulatory and able to carry out work of a light or sedentary nature, e.g., light house work, office work [Normal] : affect appropriate [de-identified] : s/p bilateral mastectomies with well healing surgical incisions w/o signs of infection

## 2023-12-27 NOTE — REVIEW OF SYSTEMS
[Fatigue] : fatigue [Diarrhea: Grade 4 - Life-threatening consequences; urgent intervention indicated] : Diarrhea: Grade 4 - Life-threatening consequences; urgent intervention indicated [Negative] : Allergic/Immunologic [FreeTextEntry2] : fatigue improving  [FreeTextEntry9] : b/l breast soreness s/p surgery 12/5 [de-identified] : walks with a cane, continued neuropathy b/l hands and feet

## 2023-12-27 NOTE — ASSESSMENT
[FreeTextEntry1] : Patient is a 67yo woman with h/o CAD (seen by cardiology in past for ?tachycardia as per patient but w/o follow up) referred by Dr. Roy for a newly diagnosed multicentric right breast invasive ductal carcinoma ER/OK negative, HER-2 positive. On MRI breast patient's disease spans 9cm with suspicious calcifications in between biopsied masses c/w IDC ER/OK- Her 2+.  cT3 (area of concern measuring 8.7cm on MRI, possibly multicentric) cN0 Stage IIB  Reviewed the biopsy results c/w ER/OK- Her2 positive breast cancer spanning a large part of patient's breast. I recommended neoadjuvant chemotherapy with dual Her 2 blockade with weekly Taxol/Carbo and Phesgo every 3 weeks over a 12 week period. I reviewed potential side effects including but not limited to allergic reactions, infusion reactions, fatigue, lowered immune system with lower blood counts, risk of neutropenic fever, needing growth factor support, nausea/vomiting, diarrhea, kidney and liver dysfunction, electrolyte abnormalities, peripheral neuropathy and reversible cardiomyopathy with dual her 2 agents. Patient expressed verbal understanding signing consent.  Reviewed curative intent goal of care and needing an MRI breast after neoadj treatment with surgery following the repeat MRI breast to assess response.  Plan prior to chemo: Echo - normal EF 5/2023. Chemo port by IR -placed at [facility].  6/21/23 Visit - Starting weekly carbo/taxol and phesgo [id number]. Compazine prn nausea. 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 I reviewed that treatment with Taxol is standard of care and asked her to keep a daily journal documenting her symptoms of numbness/tingling in her hands and feet. I reviewed with her that should she develop neuropathy we will have to re-evaluate risks/benefits of continuing treatment with weekly Taxol and may have to adjust therapy. Also discussed that this is dose dependent and usually occurs with later weeks of treatment. To monitor closely for any signs and symptoms of peripheral neuropathy. Patient had all her questions answered regarding treatment and signed consent form. Plan on checking Echos every 3 months.  7/5/23 Visit - with response to treatment on exam, w/o palpable lesion in right breast. To continue recommended treatment as planned with weekly carbo/taxol and phesgo every 21days. Compazine prn nausea. Stool negative for CDiff, and culture. To use immodium prn loose stools. reviewed instructions with patient, max dose per day 16mg. --reminded to take immodium with each loose stool. Prescribed Lomotil prn in addition to immodium. To monitor closely for any signs and symptoms of peripheral neuropathy. So far no signs/symptoms of neuropathy present.  7/26/23 - treatment held due to low platelet count of 57, to d/c carbo. 8/2/23 - treatment held due to poor oral PO intake due to oral mucositis/diarrhea, dehydration, also . To give IVF 1L NS today, hold treatment. Magic mouth wash TID before meals given mucositis.  8/9/23 To hold therapy today given ANC <1, reassured that treatment will be resumed next week w/o carbo. To drop carbo from treatment given cytopenias.  8/16/23 To proceed with weekly Taxol/Phesgo today. Blood counts improved. Carbo discontinued indefinitely.  8/23/23 To proceed with weekly Taxol today. Phesgo is every 21 days to be continued. Carbo discontinued due to low ANC in the past. To continue with treatment without Benadryl today but with keeping Dexamethasone at 10mg. Can decrease dose of Decadron to 4 with next cycle if no reaction this week.  9/13/23 Proceed with weekly taxol today (treatment #10/12). Phesgo is every 21 days to be continued. Blood counts stable. Recommended to patient to use immodium as prescribed plus lomotil. Red flags including >5 BM per day or fever 100.4F or higher to notify MD and seek medical attention. Advised that patient increase fluid intake (electrolyte based drinks, ensure etc). Will recommend nutrition see patient to help address weight loss. Next Echo before Oct 7th.  9/27/23 To hold Taxol today due to worsened neuropathy affecting function; she completed 11/12 Taxol treatments. To receive Phesgo #5 today. Use Lomotil prn diarrhea. Echo 9/26/23 with normal EF. MR breast to be scheduled, f/u with Dr Roy and surgical planning reviewed with patient is the next step in management. Also reviewed and stressed importance of follow up 2-3 weeks after surgery to assess need for additional systemic therapy with Kadcyla in case of lack of pCR at surgery; in case of pCR then Phesgo to be continued for a full year.  10/16/23  Pt improving clinically after completing systemic therapy with Taxol/Phesgo.  Grade 1 peripheral neuropathy present but improving.  MR breast with CR.  Scheduled to see Dr Roy for surgical planning today. Reviewed that patient will need systemic therapy after surgery (Phesgo if pCR and Kadcyla if not).   12/27/23 patient with pCR at surgery.  Genetics negative. With grade 2 neuropathy mostly in her hands and grade 1 in her feet. To prescribe Gabapentin 600mg QHS.  To resume Phesgo Q21 days after Echo is done to complete one full year of treatment.    RTC for follow up in 8 weeks.

## 2023-12-29 ENCOUNTER — OUTPATIENT (OUTPATIENT)
Dept: OUTPATIENT SERVICES | Facility: HOSPITAL | Age: 68
LOS: 1 days | End: 2023-12-29
Payer: MEDICARE

## 2023-12-29 DIAGNOSIS — Z96.649 PRESENCE OF UNSPECIFIED ARTIFICIAL HIP JOINT: Chronic | ICD-10-CM

## 2023-12-29 DIAGNOSIS — R78.89 FINDING OF OTHER SPECIFIED SUBSTANCES, NOT NORMALLY FOUND IN BLOOD: ICD-10-CM

## 2023-12-29 PROCEDURE — 93306 TTE W/DOPPLER COMPLETE: CPT | Mod: 26

## 2023-12-29 PROCEDURE — 93306 TTE W/DOPPLER COMPLETE: CPT

## 2023-12-29 PROCEDURE — 93356 MYOCRD STRAIN IMG SPCKL TRCK: CPT

## 2024-01-02 ENCOUNTER — NON-APPOINTMENT (OUTPATIENT)
Age: 69
End: 2024-01-02

## 2024-01-02 LAB
APTT BLD: 60.7 SEC
FERRITIN SERPL-MCNC: 51 NG/ML
FOLATE SERPL-MCNC: 7.5 NG/ML
INR PPP: 1.01
IRON SATN MFR SERPL: 13 %
IRON SERPL-MCNC: 44 UG/DL
METHYLMALONATE SERPL-SCNC: 227 NMOL/L
PT BLD: 11.5 SEC
TIBC SERPL-MCNC: 329 UG/DL
TSH SERPL-ACNC: 0.73 UIU/ML
UIBC SERPL-MCNC: 285 UG/DL
VIT B12 SERPL-MCNC: 328 PG/ML

## 2024-01-02 RX ORDER — CHLORHEXIDINE GLUCONATE 4 %
325 (65 FE) LIQUID (ML) TOPICAL DAILY
Qty: 90 | Refills: 0 | Status: ACTIVE | COMMUNITY
Start: 2024-01-02 | End: 1900-01-01

## 2024-01-04 ENCOUNTER — APPOINTMENT (OUTPATIENT)
Dept: INTERVENTIONAL RADIOLOGY/VASCULAR | Facility: HOSPITAL | Age: 69
End: 2024-01-04

## 2024-01-09 RX ORDER — PERTUZUMAB, TRASTUZUMAB, AND HYALURONIDASE-ZZXF 600; 600; 2000 MG/10ML; MG/10ML; U/10ML
15 INJECTION, SOLUTION SUBCUTANEOUS ONCE
Refills: 0 | Status: COMPLETED | OUTPATIENT
Start: 2024-01-10 | End: 2024-01-10

## 2024-01-10 ENCOUNTER — APPOINTMENT (OUTPATIENT)
Dept: INFUSION THERAPY | Facility: CLINIC | Age: 69
End: 2024-01-10

## 2024-01-10 ENCOUNTER — OUTPATIENT (OUTPATIENT)
Dept: OUTPATIENT SERVICES | Facility: HOSPITAL | Age: 69
LOS: 1 days | End: 2024-01-10
Payer: MEDICARE

## 2024-01-10 ENCOUNTER — APPOINTMENT (OUTPATIENT)
Dept: PALLIATIVE MEDICINE | Facility: CLINIC | Age: 69
End: 2024-01-10
Payer: MEDICARE

## 2024-01-10 VITALS
HEIGHT: 66 IN | WEIGHT: 181 LBS | TEMPERATURE: 98 F | HEART RATE: 80 BPM | OXYGEN SATURATION: 99 % | SYSTOLIC BLOOD PRESSURE: 112 MMHG | DIASTOLIC BLOOD PRESSURE: 74 MMHG | BODY MASS INDEX: 29.09 KG/M2 | RESPIRATION RATE: 18 BRPM

## 2024-01-10 VITALS
HEART RATE: 78 BPM | SYSTOLIC BLOOD PRESSURE: 98 MMHG | TEMPERATURE: 98 F | RESPIRATION RATE: 18 BRPM | OXYGEN SATURATION: 99 % | DIASTOLIC BLOOD PRESSURE: 67 MMHG | HEIGHT: 66 IN | WEIGHT: 177.03 LBS

## 2024-01-10 DIAGNOSIS — Z90.89 ACQUIRED ABSENCE OF OTHER ORGANS: Chronic | ICD-10-CM

## 2024-01-10 DIAGNOSIS — Z98.890 OTHER SPECIFIED POSTPROCEDURAL STATES: Chronic | ICD-10-CM

## 2024-01-10 DIAGNOSIS — Z90.49 ACQUIRED ABSENCE OF OTHER SPECIFIED PARTS OF DIGESTIVE TRACT: Chronic | ICD-10-CM

## 2024-01-10 DIAGNOSIS — Z96.649 PRESENCE OF UNSPECIFIED ARTIFICIAL HIP JOINT: Chronic | ICD-10-CM

## 2024-01-10 DIAGNOSIS — C50.912 MALIGNANT NEOPLASM OF UNSPECIFIED SITE OF LEFT FEMALE BREAST: ICD-10-CM

## 2024-01-10 DIAGNOSIS — Z71.89 OTHER SPECIFIED COUNSELING: ICD-10-CM

## 2024-01-10 LAB
ALBUMIN SERPL ELPH-MCNC: 3.4 G/DL — SIGNIFICANT CHANGE UP (ref 3.3–5)
ALBUMIN SERPL ELPH-MCNC: 3.4 G/DL — SIGNIFICANT CHANGE UP (ref 3.3–5)
ALP SERPL-CCNC: 85 U/L — SIGNIFICANT CHANGE UP (ref 40–120)
ALP SERPL-CCNC: 85 U/L — SIGNIFICANT CHANGE UP (ref 40–120)
ALT FLD-CCNC: 10 U/L — SIGNIFICANT CHANGE UP (ref 10–45)
ALT FLD-CCNC: 10 U/L — SIGNIFICANT CHANGE UP (ref 10–45)
ANION GAP SERPL CALC-SCNC: 2 MMOL/L — LOW (ref 5–17)
ANION GAP SERPL CALC-SCNC: 2 MMOL/L — LOW (ref 5–17)
AST SERPL-CCNC: 20 U/L — SIGNIFICANT CHANGE UP (ref 10–40)
AST SERPL-CCNC: 20 U/L — SIGNIFICANT CHANGE UP (ref 10–40)
BILIRUB SERPL-MCNC: 0.5 MG/DL — SIGNIFICANT CHANGE UP (ref 0.2–1.2)
BILIRUB SERPL-MCNC: 0.5 MG/DL — SIGNIFICANT CHANGE UP (ref 0.2–1.2)
BUN SERPL-MCNC: 18 MG/DL — SIGNIFICANT CHANGE UP (ref 7–23)
BUN SERPL-MCNC: 18 MG/DL — SIGNIFICANT CHANGE UP (ref 7–23)
CALCIUM SERPL-MCNC: 9.8 MG/DL — SIGNIFICANT CHANGE UP (ref 8.4–10.5)
CALCIUM SERPL-MCNC: 9.8 MG/DL — SIGNIFICANT CHANGE UP (ref 8.4–10.5)
CHLORIDE SERPL-SCNC: 111 MMOL/L — HIGH (ref 96–108)
CHLORIDE SERPL-SCNC: 111 MMOL/L — HIGH (ref 96–108)
CO2 SERPL-SCNC: 31 MMOL/L — SIGNIFICANT CHANGE UP (ref 22–31)
CO2 SERPL-SCNC: 31 MMOL/L — SIGNIFICANT CHANGE UP (ref 22–31)
CREAT SERPL-MCNC: 0.9 MG/DL — SIGNIFICANT CHANGE UP (ref 0.5–1.3)
CREAT SERPL-MCNC: 0.9 MG/DL — SIGNIFICANT CHANGE UP (ref 0.5–1.3)
EGFR: 69 ML/MIN/1.73M2 — SIGNIFICANT CHANGE UP
EGFR: 69 ML/MIN/1.73M2 — SIGNIFICANT CHANGE UP
GLUCOSE SERPL-MCNC: 75 MG/DL — SIGNIFICANT CHANGE UP (ref 70–99)
GLUCOSE SERPL-MCNC: 75 MG/DL — SIGNIFICANT CHANGE UP (ref 70–99)
HCT VFR BLD CALC: 36.7 % — SIGNIFICANT CHANGE UP (ref 34.5–45)
HCT VFR BLD CALC: 36.7 % — SIGNIFICANT CHANGE UP (ref 34.5–45)
HCT VFR BLD CALC: 37.2 % — SIGNIFICANT CHANGE UP (ref 34.5–45)
HCT VFR BLD CALC: 37.2 % — SIGNIFICANT CHANGE UP (ref 34.5–45)
HGB BLD-MCNC: 11.8 G/DL — SIGNIFICANT CHANGE UP (ref 11.5–15.5)
HGB BLD-MCNC: 11.8 G/DL — SIGNIFICANT CHANGE UP (ref 11.5–15.5)
HGB BLD-MCNC: 11.9 G/DL — SIGNIFICANT CHANGE UP (ref 11.5–15.5)
HGB BLD-MCNC: 11.9 G/DL — SIGNIFICANT CHANGE UP (ref 11.5–15.5)
LYMPHOCYTES # BLD AUTO: 1.8 K/UL — SIGNIFICANT CHANGE UP (ref 1–3.3)
LYMPHOCYTES # BLD AUTO: 1.8 K/UL — SIGNIFICANT CHANGE UP (ref 1–3.3)
LYMPHOCYTES # BLD AUTO: 28.7 % — SIGNIFICANT CHANGE UP (ref 13–44)
LYMPHOCYTES # BLD AUTO: 28.7 % — SIGNIFICANT CHANGE UP (ref 13–44)
MCHC RBC-ENTMCNC: 28.1 PG — SIGNIFICANT CHANGE UP (ref 27–34)
MCHC RBC-ENTMCNC: 28.1 PG — SIGNIFICANT CHANGE UP (ref 27–34)
MCHC RBC-ENTMCNC: 28.3 PG — SIGNIFICANT CHANGE UP (ref 27–34)
MCHC RBC-ENTMCNC: 28.3 PG — SIGNIFICANT CHANGE UP (ref 27–34)
MCHC RBC-ENTMCNC: 32 GM/DL — SIGNIFICANT CHANGE UP (ref 32–36)
MCHC RBC-ENTMCNC: 32 GM/DL — SIGNIFICANT CHANGE UP (ref 32–36)
MCHC RBC-ENTMCNC: 32.2 GM/DL — SIGNIFICANT CHANGE UP (ref 32–36)
MCHC RBC-ENTMCNC: 32.2 GM/DL — SIGNIFICANT CHANGE UP (ref 32–36)
MCV RBC AUTO: 87.9 FL — SIGNIFICANT CHANGE UP (ref 80–100)
MCV RBC AUTO: 87.9 FL — SIGNIFICANT CHANGE UP (ref 80–100)
MCV RBC AUTO: 88 FL — SIGNIFICANT CHANGE UP (ref 80–100)
MCV RBC AUTO: 88 FL — SIGNIFICANT CHANGE UP (ref 80–100)
NEUTROPHILS # BLD AUTO: 3.9 K/UL — SIGNIFICANT CHANGE UP (ref 1.8–7.4)
NEUTROPHILS # BLD AUTO: 3.9 K/UL — SIGNIFICANT CHANGE UP (ref 1.8–7.4)
NEUTROPHILS NFR BLD AUTO: 61.7 % — SIGNIFICANT CHANGE UP (ref 43–77)
NEUTROPHILS NFR BLD AUTO: 61.7 % — SIGNIFICANT CHANGE UP (ref 43–77)
PLATELET # BLD AUTO: 242 K/UL — SIGNIFICANT CHANGE UP (ref 150–400)
PLATELET # BLD AUTO: 242 K/UL — SIGNIFICANT CHANGE UP (ref 150–400)
PLATELET # BLD AUTO: 247 K/UL — SIGNIFICANT CHANGE UP (ref 150–400)
PLATELET # BLD AUTO: 247 K/UL — SIGNIFICANT CHANGE UP (ref 150–400)
POTASSIUM SERPL-MCNC: 5.4 MMOL/L — HIGH (ref 3.5–5.3)
POTASSIUM SERPL-MCNC: 5.4 MMOL/L — HIGH (ref 3.5–5.3)
POTASSIUM SERPL-SCNC: 5.4 MMOL/L — HIGH (ref 3.5–5.3)
POTASSIUM SERPL-SCNC: 5.4 MMOL/L — HIGH (ref 3.5–5.3)
PROT SERPL-MCNC: 6.8 G/DL — SIGNIFICANT CHANGE UP (ref 6–8.3)
PROT SERPL-MCNC: 6.8 G/DL — SIGNIFICANT CHANGE UP (ref 6–8.3)
RBC # BLD: 4.17 M/UL — SIGNIFICANT CHANGE UP (ref 3.8–5.2)
RBC # BLD: 4.17 M/UL — SIGNIFICANT CHANGE UP (ref 3.8–5.2)
RBC # BLD: 4.23 M/UL — SIGNIFICANT CHANGE UP (ref 3.8–5.2)
RBC # BLD: 4.23 M/UL — SIGNIFICANT CHANGE UP (ref 3.8–5.2)
RBC # FLD: 13.4 % — SIGNIFICANT CHANGE UP (ref 10.3–14.5)
RBC # FLD: 13.4 % — SIGNIFICANT CHANGE UP (ref 10.3–14.5)
RBC # FLD: 13.6 % — SIGNIFICANT CHANGE UP (ref 10.3–14.5)
RBC # FLD: 13.6 % — SIGNIFICANT CHANGE UP (ref 10.3–14.5)
SODIUM SERPL-SCNC: 144 MMOL/L — SIGNIFICANT CHANGE UP (ref 135–145)
SODIUM SERPL-SCNC: 144 MMOL/L — SIGNIFICANT CHANGE UP (ref 135–145)
WBC # BLD: 6.3 K/UL — SIGNIFICANT CHANGE UP (ref 3.8–10.5)
WBC # FLD AUTO: 6.3 K/UL — SIGNIFICANT CHANGE UP (ref 3.8–10.5)

## 2024-01-10 PROCEDURE — 80053 COMPREHEN METABOLIC PANEL: CPT

## 2024-01-10 PROCEDURE — 96401 CHEMO ANTI-NEOPL SQ/IM: CPT

## 2024-01-10 PROCEDURE — 36415 COLL VENOUS BLD VENIPUNCTURE: CPT

## 2024-01-10 PROCEDURE — 85027 COMPLETE CBC AUTOMATED: CPT

## 2024-01-10 PROCEDURE — 85025 COMPLETE CBC W/AUTO DIFF WBC: CPT

## 2024-01-10 PROCEDURE — 99205 OFFICE O/P NEW HI 60 MIN: CPT

## 2024-01-10 RX ORDER — GABAPENTIN 600 MG/1
600 TABLET, COATED ORAL
Qty: 30 | Refills: 3 | Status: DISCONTINUED | COMMUNITY
Start: 2023-12-27 | End: 2024-01-10

## 2024-01-10 RX ADMIN — PERTUZUMAB, TRASTUZUMAB, AND HYALURONIDASE-ZZXF 15 MILLILITER(S): 600; 600; 2000 INJECTION, SOLUTION SUBCUTANEOUS at 15:45

## 2024-01-10 NOTE — ASSESSMENT
[FreeTextEntry1] : - 69 yo F with CAD (seen by cardiology in past for ?tachycardia as per patient but w/o follow up) and breast cancer here for palliative evaluation.  #Peripheral Neuropathy Due to chemotherapy. Patient not interested in prescription medications at this time. - Medical cannabis certification completed today. Counseled patient on [facility] Medical Cannabis program.  #ACP Discussed patient's wishes regarding code status. Has had experience with the decision as she made her  DNR near the end of his life when he was dying of liver cancer and her mother made herself DNR. At this time, she feels that she would want CPR and intubation but that at a certain point when she feels she is not doing well physically, she would then make herself DNR. - HCP: Agusto Lopez (aunt) - [phone] - Code status: Full code (discussed today)  F/u as needed

## 2024-01-10 NOTE — HISTORY OF PRESENT ILLNESS
[FreeTextEntry1] : 67 yo F with CAD (seen by cardiology in past for ?tachycardia as per patient but w/o follow up) and breast cancer here for palliative evaluation.  Onc Hx: The patient's history began when she underwent bilateral screening mammogram on 4/13/2023. Of note, the patient's last breast imaging was in 2021. She states that she was dealing with illness in her mother and subsequent death of her mother in 2022. She was found to have scattered fibroglandular density and spiculated masses in her breast. Right breast biopsy of the 2.2 cm spiculated mass which returned as invasive ductal carcinoma, poorly differentiated, ER negative, ME negative, HER2 positive.  Patient is status post neoadjuvant chemotherapy completed on 9/27/2023. Patient ultimately elected to proceed with bilateral mastectomies she is status post bilateral mastectomies, right SLNB and left mag trace, with bilateral tissue expander based reconstruction with Dr. Phelan on 12/5/2023 final surgical pathology yielded no residual carcinoma.  Providers: Oncologist - Brandee Marsh Breast Surgery - Xochilt Roy  SYMPTOMS: #Pain Neuropathy in hands and feet that started 4 months ago. Stopped chemo about that time. Was consistent in severity but has improved a little over past few weeks. Occasionally wakes her up from sleep and is severe. Did not start gabapentin that was prescribed because she doesn't like pills. 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 Retired from [employer], used to do registration in ED for 38 years. Close [place] of friends and has an aunt who is her age who are her support system.  and . Grown son who lives with her. Used to use marijuana regularly in the 70s but now doesn't use it much anymore. Not since starting chemo.   Advanced Directives: HCP - Agusto Lopez (aunt, [facility]) [phone] MOLST - None

## 2024-01-10 NOTE — PHYSICAL EXAM
[General Appearance - Alert] : alert [General Appearance - In No Acute Distress] : in no acute distress [Sclera] : the sclera and conjunctiva were normal [PERRL With Normal Accommodation] : pupils were equal in size, round, and reactive to light [Extraocular Movements] : extraocular movements were intact [No Oral Pallor] : no oral pallor [No Oral Cyanosis] : no oral cyanosis [Outer Ear] : the ears and nose were normal in appearance [Hearing Threshold Finger Rub Not Cedar] : hearing was normal [Neck Appearance] : the appearance of the neck was normal [Neck Cervical Mass (___cm)] : no neck mass was observed [Respiration, Rhythm And Depth] : normal respiratory rhythm and effort [Auscultation Breath Sounds / Voice Sounds] : lungs were clear to auscultation bilaterally [Heart Rate And Rhythm] : heart rate was normal and rhythm regular [Heart Sounds] : normal S1 and S2 [Murmurs] : no murmurs [FreeTextEntry1] : B/l mastectomy incisions, c/d, healing well [Bowel Sounds] : normal bowel sounds [Abdomen Soft] : soft [Abdomen Tenderness] : non-tender [Abdomen Mass (___ Cm)] : no abdominal mass palpated [Involuntary Movements] : no involuntary movements were seen [Musculoskeletal - Swelling] : no joint swelling seen [Skin Color & Pigmentation] : normal skin color and pigmentation [Skin Turgor] : normal skin turgor [] : no rash [Oriented To Time, Place, And Person] : oriented to person, place, and time [Impaired Insight] : insight and judgment were intact [Affect] : the affect was normal

## 2024-01-18 ENCOUNTER — APPOINTMENT (OUTPATIENT)
Dept: PLASTIC SURGERY | Facility: CLINIC | Age: 69
End: 2024-01-18
Payer: MEDICARE

## 2024-01-18 PROCEDURE — 99024 POSTOP FOLLOW-UP VISIT: CPT

## 2024-01-18 NOTE — HISTORY OF PRESENT ILLNESS
[FreeTextEntry1] : 69 y/o female presents 6 weeks s/p bilateral tissue expander reconstruction and return to OR POD0 for hematoma evacuation on 12/05/2023.  Denies any f/c/n/v. Denies need pain medications, however, has c/o pain to lateral chest wall.  PE: Incisions well healed, no collections or s/sx of infection, right more projected than left. +left chest mediport Totals: Right: 500 cc Left: 450  cc  These are 500 cc expanders.  Massage lateral chest Today we discussed all options for second stage breast reconstruction with bilateral tissue expander to implant exchange. The nature of each surgery, its risks, benefits, alternatives, expected postoperative course, recovery and long term results were discussed in detail. All questions were answered. Will plan for mid to late March 2024. She will plan to get mediport removed prior to surgery if possible.

## 2024-01-31 ENCOUNTER — OUTPATIENT (OUTPATIENT)
Dept: OUTPATIENT SERVICES | Facility: HOSPITAL | Age: 69
LOS: 1 days | End: 2024-01-31
Payer: MEDICARE

## 2024-01-31 ENCOUNTER — APPOINTMENT (OUTPATIENT)
Dept: HEMATOLOGY ONCOLOGY | Facility: CLINIC | Age: 69
End: 2024-01-31
Payer: MEDICARE

## 2024-01-31 ENCOUNTER — LABORATORY RESULT (OUTPATIENT)
Age: 69
End: 2024-01-31

## 2024-01-31 ENCOUNTER — APPOINTMENT (OUTPATIENT)
Dept: INFUSION THERAPY | Facility: CLINIC | Age: 69
End: 2024-01-31

## 2024-01-31 VITALS
HEART RATE: 80 BPM | TEMPERATURE: 97.6 F | BODY MASS INDEX: 29.41 KG/M2 | DIASTOLIC BLOOD PRESSURE: 80 MMHG | HEIGHT: 66 IN | RESPIRATION RATE: 18 BRPM | OXYGEN SATURATION: 95 % | WEIGHT: 183 LBS | SYSTOLIC BLOOD PRESSURE: 138 MMHG

## 2024-01-31 VITALS
HEART RATE: 80 BPM | SYSTOLIC BLOOD PRESSURE: 138 MMHG | HEIGHT: 66 IN | DIASTOLIC BLOOD PRESSURE: 80 MMHG | RESPIRATION RATE: 17 BRPM | TEMPERATURE: 98 F | WEIGHT: 182.98 LBS | OXYGEN SATURATION: 95 %

## 2024-01-31 DIAGNOSIS — Z00.00 ENCOUNTER FOR GENERAL ADULT MEDICAL EXAMINATION WITHOUT ABNORMAL FINDINGS: ICD-10-CM

## 2024-01-31 DIAGNOSIS — Z96.649 PRESENCE OF UNSPECIFIED ARTIFICIAL HIP JOINT: Chronic | ICD-10-CM

## 2024-01-31 DIAGNOSIS — Z98.890 OTHER SPECIFIED POSTPROCEDURAL STATES: Chronic | ICD-10-CM

## 2024-01-31 DIAGNOSIS — Z90.89 ACQUIRED ABSENCE OF OTHER ORGANS: Chronic | ICD-10-CM

## 2024-01-31 DIAGNOSIS — Z90.49 ACQUIRED ABSENCE OF OTHER SPECIFIED PARTS OF DIGESTIVE TRACT: Chronic | ICD-10-CM

## 2024-01-31 DIAGNOSIS — C50.912 MALIGNANT NEOPLASM OF UNSPECIFIED SITE OF LEFT FEMALE BREAST: ICD-10-CM

## 2024-01-31 PROCEDURE — 99214 OFFICE O/P EST MOD 30 MIN: CPT | Mod: 25

## 2024-01-31 PROCEDURE — 96401 CHEMO ANTI-NEOPL SQ/IM: CPT

## 2024-01-31 PROCEDURE — 36415 COLL VENOUS BLD VENIPUNCTURE: CPT

## 2024-01-31 RX ORDER — PERTUZUMAB, TRASTUZUMAB, AND HYALURONIDASE-ZZXF 600; 600; 2000 MG/10ML; MG/10ML; U/10ML
10 INJECTION, SOLUTION SUBCUTANEOUS ONCE
Refills: 0 | Status: COMPLETED | OUTPATIENT
Start: 2024-01-31 | End: 2024-01-31

## 2024-01-31 RX ADMIN — PERTUZUMAB, TRASTUZUMAB, AND HYALURONIDASE-ZZXF 10 MILLILITER(S): 600; 600; 2000 INJECTION, SOLUTION SUBCUTANEOUS at 11:28

## 2024-01-31 NOTE — PHYSICAL EXAM
[Restricted in physically strenuous activity but ambulatory and able to carry out work of a light or sedentary nature] : Status 1- Restricted in physically strenuous activity but ambulatory and able to carry out work of a light or sedentary nature, e.g., light house work, office work [Normal] : affect appropriate [de-identified] : s/p bilateral mastectomies with well healing surgical incisions w/o signs of infection

## 2024-01-31 NOTE — HISTORY OF PRESENT ILLNESS
[de-identified] : Here for f/u.  Reports ongoing fatigue and b/l neuropathy in hands and feet, states in her hands numbness/tingling improved.  Otherwise feeling ok.  States prefers not to take Gabapentin. [FreeTextEntry1] : Patient is a 69yo woman with h/o CAD (seen by cardiology in past for ?tachycardia as per patient but w/o follow up) referred by Dr. Roy for a newly diagnosed multicentric right breast invasive ductal carcinoma ER/MI negative, HER-2 positive. Here today for f/u and phesgo.   Patient presents to initial visit on 6/5/23 with her aunt.   The patient's history began when she underwent bilateral screening mammogram on 4/13/2023.  Of note, the patient's last breast imaging was in 2021. She states that she was dealing with illness in her mother and subsequent death of her mother in 2022.  She was found to have scattered fibroglandular density.  There was a spiculated mass with calcifications at the 6 to 7 o'clock position 5 cm from the nipple.  There was a smaller mass in the more posterior 7 o'clock position 11 cm from the nipple.  There was an asymmetry in the right breast 6 cm from the nipple.  There was also pleomorphic calcifications in the mid to posterior right breast for which additional imaging was recommended.  The patient underwent right breast diagnostic mammogram and sonogram on 4/26/2023.  She was found to have the findings described above for which biopsy was recommended of all the sites.  The patient underwent right breast 6:00 biopsy of the 2.2 cm spiculated mass which returned as invasive ductal carcinoma, poorly differentiated, ER negative, MI negative, HER2 positive.  She also underwent right breast ultrasound-guided biopsy of the 8:00 0.9 cm mass.  Pathology returned as invasive ductal carcinoma, ER negative, MI negative, HER2 positive.  At the right breast 5 o'clock position stereotactic core biopsy was performed of the calcifications which returned as a microinvasive carcinoma with DCIS, high nuclear grade. Of note, there are highly suspicious calcifications identified between the calcifications sampled and the 8:00 posterior mass.  The findings are compatible with multicentric breast cancer involving at least 2 quadrants and spanning over 9 cm.    MRI breast on 6/2/23 showed extensive disease in the right breast with the dominant tumor abutting the skin, and two sites of suspicion on the left breast recommended for MRI biopsies. Area of skin abutting the tumor will be marked by Dr Montgomery from radiation oncology as per Dr Roy prior to pt starting chemo.  10/5/23 MR BREAST Complete imaging response to neoadjuvant chemotherapy RIGHT breast for multicentric breast cancer. Unremarkable LEFT breast. Treatment planning is in progress.

## 2024-01-31 NOTE — ASSESSMENT
[FreeTextEntry1] : Patient is a 67yo woman with h/o CAD (seen by cardiology in past for ?tachycardia as per patient but w/o follow up) referred by Dr. Roy for a newly diagnosed multicentric right breast invasive ductal carcinoma ER/KY negative, HER-2 positive. On MRI breast patient's disease spans 9cm with suspicious calcifications in between biopsied masses c/w IDC ER/KY- Her 2+.  cT3 (area of concern measuring 8.7cm on MRI, possibly multicentric) cN0 Stage IIB  Reviewed the biopsy results c/w ER/KY- Her2 positive breast cancer spanning a large part of patient's breast. I recommended neoadjuvant chemotherapy with dual Her 2 blockade with weekly Taxol/Carbo and Phesgo every 3 weeks over a 12 week period. I reviewed potential side effects including but not limited to allergic reactions, infusion reactions, fatigue, lowered immune system with lower blood counts, risk of neutropenic fever, needing growth factor support, nausea/vomiting, diarrhea, kidney and liver dysfunction, electrolyte abnormalities, peripheral neuropathy and reversible cardiomyopathy with dual her 2 agents. Patient expressed verbal understanding signing consent.  Reviewed curative intent goal of care and needing an MRI breast after neoadj treatment with surgery following the repeat MRI breast to assess response.  Plan prior to chemo: Echo - normal EF 5/2023. Chemo port by IR -placed at [facility].  6/21/23 Visit - Starting weekly carbo/taxol and phesgo [id number]. Compazine prn nausea. 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 I reviewed that treatment with Taxol is standard of care and asked her to keep a daily journal documenting her symptoms of numbness/tingling in her hands and feet. I reviewed with her that should she develop neuropathy we will have to re-evaluate risks/benefits of continuing treatment with weekly Taxol and may have to adjust therapy. Also discussed that this is dose dependent and usually occurs with later weeks of treatment. To monitor closely for any signs and symptoms of peripheral neuropathy. Patient had all her questions answered regarding treatment and signed consent form. Plan on checking Echos every 3 months.  7/5/23 Visit - with response to treatment on exam, w/o palpable lesion in right breast. To continue recommended treatment as planned with weekly carbo/taxol and phesgo every 21days. Compazine prn nausea. Stool negative for CDiff, and culture. To use immodium prn loose stools. reviewed instructions with patient, max dose per day 16mg. --reminded to take immodium with each loose stool. Prescribed Lomotil prn in addition to immodium. To monitor closely for any signs and symptoms of peripheral neuropathy. So far no signs/symptoms of neuropathy present.  7/26/23 - treatment held due to low platelet count of 57, to d/c carbo. 8/2/23 - treatment held due to poor oral PO intake due to oral mucositis/diarrhea, dehydration, also . To give IVF 1L NS today, hold treatment. Magic mouth wash TID before meals given mucositis.  8/9/23 To hold therapy today given ANC <1, reassured that treatment will be resumed next week w/o carbo. To drop carbo from treatment given cytopenias.  8/16/23 To proceed with weekly Taxol/Phesgo today. Blood counts improved. Carbo discontinued indefinitely.  8/23/23 To proceed with weekly Taxol today. Phesgo is every 21 days to be continued. Carbo discontinued due to low ANC in the past. To continue with treatment without Benadryl today but with keeping Dexamethasone at 10mg. Can decrease dose of Decadron to 4 with next cycle if no reaction this week.  9/13/23 Proceed with weekly taxol today (treatment #10/12). Phesgo is every 21 days to be continued. Blood counts stable. Recommended to patient to use immodium as prescribed plus lomotil. Red flags including >5 BM per day or fever 100.4F or higher to notify MD and seek medical attention. Advised that patient increase fluid intake (electrolyte based drinks, ensure etc). Will recommend nutrition see patient to help address weight loss. Next Echo before Oct 7th.  9/27/23 To hold Taxol today due to worsened neuropathy affecting function; she completed 11/12 Taxol treatments. To receive Phesgo #5 today. Use Lomotil prn diarrhea. Echo 9/26/23 with normal EF. MR breast to be scheduled, f/u with Dr Roy and surgical planning reviewed with patient is the next step in management. Also reviewed and stressed importance of follow up 2-3 weeks after surgery to assess need for additional systemic therapy with Kadcyla in case of lack of pCR at surgery; in case of pCR then Phesgo to be continued for a full year.  10/16/23  Pt improving clinically after completing systemic therapy with Taxol/Phesgo.  Grade 1 peripheral neuropathy present but improving.  MR breast with CR.  Scheduled to see Dr Roy for surgical planning today. Reviewed that patient will need systemic therapy after surgery (Phesgo if pCR and Kadcyla if not).   12/27/23 patient with pCR at surgery.  Genetics negative. With grade 2 neuropathy mostly in her hands and grade 1 in her feet. To prescribe Gabapentin 600mg QHS.  To resume Phesgo Q21 days after Echo is done to complete one full year of treatment.   1/31/24 Here for f/u and for 2nd post op Phesgo, first one given on 1/10.  Echo 12/29/23 EF 57% ok to proceed with treatment today.  pt is asking to have chemoport removed -- to set up with IR.  Reconstructive surgery planned for sometime in March.  Pt seeing Dr Canchola for pain management/neuropathy.    RTC for follow up in 6 weeks.

## 2024-01-31 NOTE — REVIEW OF SYSTEMS
[Fatigue] : fatigue [Diarrhea: Grade 4 - Life-threatening consequences; urgent intervention indicated] : Diarrhea: Grade 4 - Life-threatening consequences; urgent intervention indicated [Negative] : Allergic/Immunologic [FreeTextEntry9] : b/l breast soreness s/p surgery 12/5 [de-identified] : walks with a cane, continued neuropathy b/l hands and feet

## 2024-02-01 LAB
ALBUMIN SERPL ELPH-MCNC: 3.8 G/DL
ALP BLD-CCNC: 88 U/L
ALT SERPL-CCNC: 12 U/L
ANION GAP SERPL CALC-SCNC: -1 MMOL/L
APTT BLD: 31.8 SEC
AST SERPL-CCNC: 24 U/L
BILIRUB SERPL-MCNC: 0.6 MG/DL
BUN SERPL-MCNC: 20 MG/DL
CALCIUM SERPL-MCNC: 9.8 MG/DL
CHLORIDE SERPL-SCNC: 109 MMOL/L
CO2 SERPL-SCNC: 30 MMOL/L
CREAT SERPL-MCNC: 0.8 MG/DL
EGFR: 79 ML/MIN/1.73M2
GLUCOSE SERPL-MCNC: 97 MG/DL
INR PPP: 0.96
POTASSIUM SERPL-SCNC: 4.7 MMOL/L
PROT SERPL-MCNC: 7.2 G/DL
PT BLD: 11 SEC
SODIUM SERPL-SCNC: 138 MMOL/L

## 2024-02-05 ENCOUNTER — APPOINTMENT (OUTPATIENT)
Dept: HEMATOLOGY ONCOLOGY | Facility: CLINIC | Age: 69
End: 2024-02-05
Payer: MEDICARE

## 2024-02-05 DIAGNOSIS — Z51.5 ENCOUNTER FOR PALLIATIVE CARE: ICD-10-CM

## 2024-02-05 PROCEDURE — 90834 PSYTX W PT 45 MINUTES: CPT | Mod: 95

## 2024-02-08 ENCOUNTER — APPOINTMENT (OUTPATIENT)
Dept: INTERVENTIONAL RADIOLOGY/VASCULAR | Facility: HOSPITAL | Age: 69
End: 2024-02-08

## 2024-02-08 ENCOUNTER — OUTPATIENT (OUTPATIENT)
Dept: OUTPATIENT SERVICES | Facility: HOSPITAL | Age: 69
LOS: 1 days | End: 2024-02-08
Payer: MEDICARE

## 2024-02-08 ENCOUNTER — RESULT REVIEW (OUTPATIENT)
Age: 69
End: 2024-02-08

## 2024-02-08 DIAGNOSIS — Z96.649 PRESENCE OF UNSPECIFIED ARTIFICIAL HIP JOINT: Chronic | ICD-10-CM

## 2024-02-08 DIAGNOSIS — Z90.89 ACQUIRED ABSENCE OF OTHER ORGANS: Chronic | ICD-10-CM

## 2024-02-08 DIAGNOSIS — Z90.49 ACQUIRED ABSENCE OF OTHER SPECIFIED PARTS OF DIGESTIVE TRACT: Chronic | ICD-10-CM

## 2024-02-08 DIAGNOSIS — Z98.890 OTHER SPECIFIED POSTPROCEDURAL STATES: Chronic | ICD-10-CM

## 2024-02-08 PROCEDURE — 36590 REMOVAL TUNNELED CV CATH: CPT

## 2024-02-12 ENCOUNTER — APPOINTMENT (OUTPATIENT)
Dept: HEMATOLOGY ONCOLOGY | Facility: CLINIC | Age: 69
End: 2024-02-12
Payer: COMMERCIAL

## 2024-02-12 PROCEDURE — 90834 PSYTX W PT 45 MINUTES: CPT | Mod: 95

## 2024-02-21 ENCOUNTER — APPOINTMENT (OUTPATIENT)
Dept: INFUSION THERAPY | Facility: CLINIC | Age: 69
End: 2024-02-21

## 2024-02-21 ENCOUNTER — OUTPATIENT (OUTPATIENT)
Dept: OUTPATIENT SERVICES | Facility: HOSPITAL | Age: 69
LOS: 1 days | End: 2024-02-21
Payer: MEDICARE

## 2024-02-21 VITALS
RESPIRATION RATE: 18 BRPM | HEART RATE: 84 BPM | OXYGEN SATURATION: 97 % | WEIGHT: 177.03 LBS | DIASTOLIC BLOOD PRESSURE: 79 MMHG | TEMPERATURE: 98 F | HEIGHT: 66 IN | SYSTOLIC BLOOD PRESSURE: 129 MMHG

## 2024-02-21 DIAGNOSIS — Z96.649 PRESENCE OF UNSPECIFIED ARTIFICIAL HIP JOINT: Chronic | ICD-10-CM

## 2024-02-21 DIAGNOSIS — Z98.890 OTHER SPECIFIED POSTPROCEDURAL STATES: Chronic | ICD-10-CM

## 2024-02-21 DIAGNOSIS — C50.912 MALIGNANT NEOPLASM OF UNSPECIFIED SITE OF LEFT FEMALE BREAST: ICD-10-CM

## 2024-02-21 DIAGNOSIS — Z90.89 ACQUIRED ABSENCE OF OTHER ORGANS: Chronic | ICD-10-CM

## 2024-02-21 DIAGNOSIS — Z90.49 ACQUIRED ABSENCE OF OTHER SPECIFIED PARTS OF DIGESTIVE TRACT: Chronic | ICD-10-CM

## 2024-02-21 LAB
ALBUMIN SERPL ELPH-MCNC: 3.5 G/DL — SIGNIFICANT CHANGE UP (ref 3.3–5)
ALP SERPL-CCNC: 84 U/L — SIGNIFICANT CHANGE UP (ref 40–120)
ALT FLD-CCNC: 13 U/L — SIGNIFICANT CHANGE UP (ref 10–45)
ANION GAP SERPL CALC-SCNC: 5 MMOL/L — SIGNIFICANT CHANGE UP (ref 5–17)
AST SERPL-CCNC: 19 U/L — SIGNIFICANT CHANGE UP (ref 10–40)
BILIRUB SERPL-MCNC: 0.6 MG/DL — SIGNIFICANT CHANGE UP (ref 0.2–1.2)
BUN SERPL-MCNC: 17 MG/DL — SIGNIFICANT CHANGE UP (ref 7–23)
CALCIUM SERPL-MCNC: 9.4 MG/DL — SIGNIFICANT CHANGE UP (ref 8.4–10.5)
CHLORIDE SERPL-SCNC: 110 MMOL/L — HIGH (ref 96–108)
CO2 SERPL-SCNC: 30 MMOL/L — SIGNIFICANT CHANGE UP (ref 22–31)
CREAT SERPL-MCNC: 1 MG/DL — SIGNIFICANT CHANGE UP (ref 0.5–1.3)
EGFR: 61 ML/MIN/1.73M2 — SIGNIFICANT CHANGE UP
GLUCOSE SERPL-MCNC: 69 MG/DL — LOW (ref 70–99)
HCT VFR BLD CALC: 37.8 % — SIGNIFICANT CHANGE UP (ref 34.5–45)
HGB BLD-MCNC: 12.1 G/DL — SIGNIFICANT CHANGE UP (ref 11.5–15.5)
LYMPHOCYTES # BLD AUTO: 1.3 K/UL — SIGNIFICANT CHANGE UP (ref 1–3.3)
LYMPHOCYTES # BLD AUTO: 24.8 % — SIGNIFICANT CHANGE UP (ref 13–44)
MCHC RBC-ENTMCNC: 27.3 PG — SIGNIFICANT CHANGE UP (ref 27–34)
MCHC RBC-ENTMCNC: 32 GM/DL — SIGNIFICANT CHANGE UP (ref 32–36)
MCV RBC AUTO: 85.1 FL — SIGNIFICANT CHANGE UP (ref 80–100)
NEUTROPHILS # BLD AUTO: 3.5 K/UL — SIGNIFICANT CHANGE UP (ref 1.8–7.4)
NEUTROPHILS NFR BLD AUTO: 68.9 % — SIGNIFICANT CHANGE UP (ref 43–77)
PLATELET # BLD AUTO: 226 K/UL — SIGNIFICANT CHANGE UP (ref 150–400)
POTASSIUM SERPL-MCNC: 4.1 MMOL/L — SIGNIFICANT CHANGE UP (ref 3.5–5.3)
POTASSIUM SERPL-SCNC: 4.1 MMOL/L — SIGNIFICANT CHANGE UP (ref 3.5–5.3)
PROT SERPL-MCNC: 7.2 G/DL — SIGNIFICANT CHANGE UP (ref 6–8.3)
RBC # BLD: 4.44 M/UL — SIGNIFICANT CHANGE UP (ref 3.8–5.2)
RBC # FLD: 13.5 % — SIGNIFICANT CHANGE UP (ref 10.3–14.5)
SODIUM SERPL-SCNC: 145 MMOL/L — SIGNIFICANT CHANGE UP (ref 135–145)
WBC # BLD: 5.1 K/UL — SIGNIFICANT CHANGE UP (ref 3.8–10.5)
WBC # FLD AUTO: 5.1 K/UL — SIGNIFICANT CHANGE UP (ref 3.8–10.5)

## 2024-02-21 PROCEDURE — 80053 COMPREHEN METABOLIC PANEL: CPT

## 2024-02-21 PROCEDURE — 96401 CHEMO ANTI-NEOPL SQ/IM: CPT

## 2024-02-21 PROCEDURE — 85025 COMPLETE CBC W/AUTO DIFF WBC: CPT

## 2024-02-21 PROCEDURE — 36415 COLL VENOUS BLD VENIPUNCTURE: CPT

## 2024-02-21 RX ORDER — PERTUZUMAB, TRASTUZUMAB, AND HYALURONIDASE-ZZXF 600; 600; 2000 MG/10ML; MG/10ML; U/10ML
10 INJECTION, SOLUTION SUBCUTANEOUS ONCE
Refills: 0 | Status: COMPLETED | OUTPATIENT
Start: 2024-02-21 | End: 2024-02-21

## 2024-02-21 RX ADMIN — PERTUZUMAB, TRASTUZUMAB, AND HYALURONIDASE-ZZXF 10 MILLILITER(S): 600; 600; 2000 INJECTION, SOLUTION SUBCUTANEOUS at 12:28

## 2024-02-21 NOTE — DISCHARGE INSTRUCTIONS: CHEMOTHERAPY - NSFACILITYCONTAFTRHOUR_HEME_A_AMB
[facility] ([phone])

## 2024-02-26 ENCOUNTER — APPOINTMENT (OUTPATIENT)
Dept: HEMATOLOGY ONCOLOGY | Facility: CLINIC | Age: 69
End: 2024-02-26

## 2024-02-29 ENCOUNTER — APPOINTMENT (OUTPATIENT)
Dept: PLASTIC SURGERY | Facility: CLINIC | Age: 69
End: 2024-02-29
Payer: MEDICARE

## 2024-02-29 PROCEDURE — 99024 POSTOP FOLLOW-UP VISIT: CPT

## 2024-02-29 NOTE — HISTORY OF PRESENT ILLNESS
[FreeTextEntry1] : 67 y/o female presents 12 weeks s/p bilateral tissue expander reconstruction and return to OR POD0 for hematoma evacuation on 12/05/2023.  Denies any f/c/n/v. Denies need pain medications. She had her mediport removed.   PE: Incisions well healed, no collections or s/sx of infection, right more projected than left. left chest mediport incision healing well Totals: Right: 500 cc Left: 450 cc  These are 500 cc expanders.  Today we discussed second stage breast reconstruction with bilateral tissue expander to silicone vs saline implant exchange. The nature of each surgery, its risks, benefits, alternatives, expected postoperative course, recovery and long term results were discussed in detail. All questions were answered. Will plan for mid to late March 2024 at [facility].

## 2024-03-04 ENCOUNTER — APPOINTMENT (OUTPATIENT)
Dept: HEMATOLOGY ONCOLOGY | Facility: CLINIC | Age: 69
End: 2024-03-04
Payer: MEDICARE

## 2024-03-04 PROCEDURE — 90834 PSYTX W PT 45 MINUTES: CPT | Mod: 95

## 2024-03-06 ENCOUNTER — NON-APPOINTMENT (OUTPATIENT)
Age: 69
End: 2024-03-06

## 2024-03-07 ENCOUNTER — NON-APPOINTMENT (OUTPATIENT)
Age: 69
End: 2024-03-07

## 2024-03-11 ENCOUNTER — APPOINTMENT (OUTPATIENT)
Dept: HEMATOLOGY ONCOLOGY | Facility: CLINIC | Age: 69
End: 2024-03-11
Payer: COMMERCIAL

## 2024-03-11 PROCEDURE — 90834 PSYTX W PT 45 MINUTES: CPT | Mod: 95

## 2024-03-13 ENCOUNTER — OUTPATIENT (OUTPATIENT)
Dept: OUTPATIENT SERVICES | Facility: HOSPITAL | Age: 69
LOS: 1 days | End: 2024-03-13
Payer: MEDICARE

## 2024-03-13 ENCOUNTER — LABORATORY RESULT (OUTPATIENT)
Age: 69
End: 2024-03-13

## 2024-03-13 ENCOUNTER — APPOINTMENT (OUTPATIENT)
Dept: INFUSION THERAPY | Facility: CLINIC | Age: 69
End: 2024-03-13

## 2024-03-13 ENCOUNTER — APPOINTMENT (OUTPATIENT)
Dept: HEMATOLOGY ONCOLOGY | Facility: CLINIC | Age: 69
End: 2024-03-13
Payer: MEDICARE

## 2024-03-13 VITALS
BODY MASS INDEX: 29.09 KG/M2 | OXYGEN SATURATION: 97 % | HEIGHT: 66 IN | WEIGHT: 181 LBS | RESPIRATION RATE: 18 BRPM | DIASTOLIC BLOOD PRESSURE: 82 MMHG | TEMPERATURE: 97.8 F | SYSTOLIC BLOOD PRESSURE: 134 MMHG | HEART RATE: 96 BPM

## 2024-03-13 VITALS
RESPIRATION RATE: 18 BRPM | TEMPERATURE: 97 F | WEIGHT: 177.03 LBS | HEIGHT: 66 IN | SYSTOLIC BLOOD PRESSURE: 110 MMHG | OXYGEN SATURATION: 99 % | HEART RATE: 82 BPM | DIASTOLIC BLOOD PRESSURE: 78 MMHG

## 2024-03-13 DIAGNOSIS — Z98.890 OTHER SPECIFIED POSTPROCEDURAL STATES: Chronic | ICD-10-CM

## 2024-03-13 DIAGNOSIS — C50.912 MALIGNANT NEOPLASM OF UNSPECIFIED SITE OF LEFT FEMALE BREAST: ICD-10-CM

## 2024-03-13 DIAGNOSIS — Z90.89 ACQUIRED ABSENCE OF OTHER ORGANS: Chronic | ICD-10-CM

## 2024-03-13 DIAGNOSIS — D50.8 OTHER IRON DEFICIENCY ANEMIAS: ICD-10-CM

## 2024-03-13 DIAGNOSIS — R79.89 OTHER SPECIFIED ABNORMAL FINDINGS OF BLOOD CHEMISTRY: ICD-10-CM

## 2024-03-13 DIAGNOSIS — Z90.49 ACQUIRED ABSENCE OF OTHER SPECIFIED PARTS OF DIGESTIVE TRACT: Chronic | ICD-10-CM

## 2024-03-13 DIAGNOSIS — Z96.649 PRESENCE OF UNSPECIFIED ARTIFICIAL HIP JOINT: Chronic | ICD-10-CM

## 2024-03-13 PROCEDURE — 96401 CHEMO ANTI-NEOPL SQ/IM: CPT

## 2024-03-13 PROCEDURE — 99214 OFFICE O/P EST MOD 30 MIN: CPT

## 2024-03-13 RX ORDER — PERTUZUMAB, TRASTUZUMAB, AND HYALURONIDASE-ZZXF 600; 600; 2000 MG/10ML; MG/10ML; U/10ML
10 INJECTION, SOLUTION SUBCUTANEOUS ONCE
Refills: 0 | Status: COMPLETED | OUTPATIENT
Start: 2024-03-13 | End: 2024-03-13

## 2024-03-13 RX ADMIN — PERTUZUMAB, TRASTUZUMAB, AND HYALURONIDASE-ZZXF 10 MILLILITER(S): 600; 600; 2000 INJECTION, SOLUTION SUBCUTANEOUS at 12:06

## 2024-03-13 NOTE — ASSESSMENT
[FreeTextEntry1] : Patient is a 69yo woman with h/o CAD (seen by cardiology in past for ?tachycardia as per patient but w/o follow up) referred by Dr. Roy for a newly diagnosed multicentric right breast invasive ductal carcinoma ER/[place] negative, HER-2 positive. On MRI breast patient's disease spans 9cm with suspicious calcifications in between biopsied masses c/w IDC ER/OK- Her 2+.  cT3 (area of concern measuring 8.7cm on MRI, possibly multicentric) cN0 Stage IIB  Reviewed the biopsy results c/w ER/OK- Her2 positive breast cancer spanning a large part of patient's breast. I recommended neoadjuvant chemotherapy with dual Her 2 blockade with weekly Taxol/Carbo and Phesgo every 3 weeks over a 12 week period. I reviewed potential side effects including but not limited to allergic reactions, infusion reactions, fatigue, lowered immune system with lower blood counts, risk of neutropenic fever, needing growth factor support, nausea/vomiting, diarrhea, kidney and liver dysfunction, electrolyte abnormalities, peripheral neuropathy and reversible cardiomyopathy with dual her 2 agents. Patient expressed verbal understanding signing consent.  Reviewed curative intent goal of care and needing an MRI breast after neoadj treatment with surgery following the repeat MRI breast to assess response.  Plan prior to chemo: Echo - normal EF 5/2023. Chemo port by IR -placed at [facility].  6/21/23 Visit - Starting weekly carbo/taxol and phesgo [id number]. Compazine prn nausea. 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 I reviewed that treatment with Taxol is standard of care and asked her to keep a daily journal documenting her symptoms of numbness/tingling in her hands and feet. I reviewed with her that should she develop neuropathy we will have to re-evaluate risks/benefits of continuing treatment with weekly Taxol and may have to adjust therapy. Also discussed that this is dose dependent and usually occurs with later weeks of treatment. To monitor closely for any signs and symptoms of peripheral neuropathy. Patient had all her questions answered regarding treatment and signed consent form. Plan on checking Echos every 3 months.  7/5/23 Visit - with response to treatment on exam, w/o palpable lesion in right breast. To continue recommended treatment as planned with weekly carbo/taxol and phesgo every 21days. Compazine prn nausea. Stool negative for CDiff, and culture. To use immodium prn loose stools. reviewed instructions with patient, max dose per day 16mg. --reminded to take immodium with each loose stool. Prescribed Lomotil prn in addition to immodium. To monitor closely for any signs and symptoms of peripheral neuropathy. So far no signs/symptoms of neuropathy present.  7/26/23 - treatment held due to low platelet count of 57, to d/c carbo. 8/2/23 - treatment held due to poor oral PO intake due to oral mucositis/diarrhea, dehydration, also . To give IVF 1L NS today, hold treatment. Magic mouth wash TID before meals given mucositis.  8/9/23 To hold therapy today given ANC <1, reassured that treatment will be resumed next week w/o carbo. To drop carbo from treatment given cytopenias.  8/16/23 To proceed with weekly Taxol/Phesgo today. Blood counts improved. Carbo discontinued indefinitely.  8/23/23 To proceed with weekly Taxol today. Phesgo is every 21 days to be continued. Carbo discontinued due to low ANC in the past. To continue with treatment without Benadryl today but with keeping Dexamethasone at 10mg. Can decrease dose of Decadron to 4 with next cycle if no reaction this week.  9/13/23 Proceed with weekly taxol today (treatment #10/12). Phesgo is every 21 days to be continued. Blood counts stable. Recommended to patient to use immodium as prescribed plus lomotil. Red flags including >5 BM per day or fever 100.4F or higher to notify MD and seek medical attention. Advised that patient increase fluid intake (electrolyte based drinks, ensure etc). Will recommend nutrition see patient to help address weight loss. Next Echo before Oct 7th.  9/27/23 To hold Taxol today due to worsened neuropathy affecting function; she completed 11/12 Taxol treatments. To receive Phesgo #5 today. Use Lomotil prn diarrhea. Echo 9/26/23 with normal EF. MR breast to be scheduled, f/u with Dr Roy and surgical planning reviewed with patient is the next step in management. Also reviewed and stressed importance of follow up 2-3 weeks after surgery to assess need for additional systemic therapy with Kadcyla in case of lack of pCR at surgery; in case of pCR then Phesgo to be continued for a full year.  10/16/23  Pt improving clinically after completing systemic therapy with Taxol/Phesgo.  Grade 1 peripheral neuropathy present but improving.  MR breast with CR.  Scheduled to see Dr Roy for surgical planning today. Reviewed that patient will need systemic therapy after surgery (Phesgo if pCR and Kadcyla if not).   12/27/23 patient with pCR at surgery.  Genetics negative. With grade 2 neuropathy mostly in her hands and grade 1 in her feet. To prescribe Gabapentin 600mg QHS.  To resume Phesgo Q21 days after Echo is done to complete one full year of treatment.   1/31/24 Here for f/u and for 2nd post op Phesgo, first one given on 1/10.  Echo 12/29/23 EF 57% ok to proceed with treatment today.  pt is asking to have chemoport removed -- to set up with IR.  Reconstructive surgery planned for sometime in March.  Pt seeing Dr Canchola for pain management/neuropathy.   3/13/24 - To continue with adj Phesgo to complete one full year of HER2 therapy.  To f/u on EF on Echo at end of March.  Pt declining to see rad onc in f/u.    RTC for follow up in 6 weeks.

## 2024-03-13 NOTE — REVIEW OF SYSTEMS
[Fatigue] : fatigue [Diarrhea: Grade 4 - Life-threatening consequences; urgent intervention indicated] : Diarrhea: Grade 4 - Life-threatening consequences; urgent intervention indicated [Negative] : Allergic/Immunologic [de-identified] : walks with a cane, continued neuropathy b/l hands and feet [FreeTextEntry9] : b/l breast soreness s/p surgery 12/5

## 2024-03-13 NOTE — PHYSICAL EXAM
[Restricted in physically strenuous activity but ambulatory and able to carry out work of a light or sedentary nature] : Status 1- Restricted in physically strenuous activity but ambulatory and able to carry out work of a light or sedentary nature, e.g., light house work, office work [Normal] : grossly intact [de-identified] : s/p bilateral mastectomies with well healed surgical incisions

## 2024-03-13 NOTE — HISTORY OF PRESENT ILLNESS
[de-identified] : Here for f/u.  Reports ongoing fatigue and b/l neuropathy in hands and feet, states in her hands numbness/tingling improved.  Otherwise feeling ok.  States prefers not to take Gabapentin. [FreeTextEntry1] : Patient is a 67yo woman with h/o CAD (seen by cardiology in past for ?tachycardia as per patient but w/o follow up) referred by Dr. 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 Here today for f/u and phesgo.   Patient presents to initial visit on 6/5/23 with her aunt.   The patient's history began when she underwent bilateral screening mammogram on 4/13/2023.  Of note, the patient's last breast imaging was in 2021. She states that she was dealing with illness in her mother and subsequent death of her mother in 2022.  She was found to have scattered fibroglandular density.  There was a spiculated mass with calcifications at the 6 to 7 o'clock position 5 cm from the nipple.  There was a smaller mass in the more posterior 7 o'clock position 11 cm from the nipple.  There was an asymmetry in the right breast 6 cm from the nipple.  There was also pleomorphic calcifications in the mid to posterior right breast for which additional imaging was recommended.  The patient underwent right breast diagnostic mammogram and sonogram on 4/26/2023.  She was found to have the findings described above for which biopsy was recommended of all the sites.  The patient underwent right breast 6:00 biopsy of the 2.2 cm spiculated mass which returned as invasive ductal carcinoma, poorly differentiated, ER negative, VT negative, HER2 positive.  She also underwent right breast ultrasound-guided biopsy of the 8:00 0.9 cm mass.  Pathology returned as invasive ductal carcinoma, ER negative, VT negative, HER2 positive.  At the right breast 5 o'clock position stereotactic core biopsy was performed of the calcifications which returned as a microinvasive carcinoma with DCIS, high nuclear grade. Of note, there are highly suspicious calcifications identified between the calcifications sampled and the 8:00 posterior mass.  The findings are compatible with multicentric breast cancer involving at least 2 quadrants and spanning over 9 cm.    MRI breast on 6/2/23 showed extensive disease in the right breast with the dominant tumor abutting the skin, and two sites of suspicion on the left breast recommended for MRI biopsies. Area of skin abutting the tumor will be marked by Dr Montgomery from radiation oncology as per Dr Roy prior to pt starting chemo.  10/5/23 MR BREAST Complete imaging response to neoadjuvant chemotherapy RIGHT breast for multicentric breast cancer. Unremarkable LEFT breast. Treatment planning is in progress.

## 2024-03-15 ENCOUNTER — APPOINTMENT (OUTPATIENT)
Dept: BREAST CENTER | Facility: CLINIC | Age: 69
End: 2024-03-15
Payer: MEDICARE

## 2024-03-15 VITALS
HEART RATE: 80 BPM | SYSTOLIC BLOOD PRESSURE: 138 MMHG | DIASTOLIC BLOOD PRESSURE: 62 MMHG | HEIGHT: 66 IN | WEIGHT: 178 LBS | BODY MASS INDEX: 28.61 KG/M2

## 2024-03-15 VITALS
BODY MASS INDEX: 28.61 KG/M2 | WEIGHT: 178 LBS | SYSTOLIC BLOOD PRESSURE: 146 MMHG | HEART RATE: 84 BPM | DIASTOLIC BLOOD PRESSURE: 76 MMHG | HEIGHT: 66 IN

## 2024-03-15 PROCEDURE — 99213 OFFICE O/P EST LOW 20 MIN: CPT

## 2024-03-15 NOTE — DATA REVIEWED
[FreeTextEntry1] : 4/13/23 ([facility]) B/l screening mammo: scattered fibroglandular density. There is a highly suspicious spiculated mass with calcifications in between 6 and 7:00 in the right breast 5 cm from the nipple (annotated) for which spot magnification CC/ML and full-field lateral views with tomosynthesis and ultrasound are recommended.  There is a smaller mass in the more posterior 7:00 right breast 11 cm from the nipple (annotated) for which spot compression CC/MLO and ultrasound are recommended.  There is a developing mass/asymmetry in the superior aspect of the right breast on the MLO view 6 cm from the nipple (annotated) for which a spot compression MLO view and ultrasound are recommended.  Pleomorphic calcifications have developed in the middle to posterior right breast from 5:00 to 7:00 highly suspicious for DCIS (annotated) for which additional magnification CC/ML views are recommended. The left breast demonstrates no suspicious masses, calcifications, skin thickening or areas of distortion. BI-RADS 0.   4/26/23 ([facility]) right diag mammo and US: scattered fibroglandular density. 1. Right lower outer quadrant suspicious masses, ultrasound-guided biopsy, 2 sites to demonstrate at least multifocal disease.  2. Right lower inner quadrant fine pleomorphic calcifications, stereotactic guided, 1 site recommended, to evaluate for multicentric disease. BI-RADS 5.   5/16/23 ([facility]) 1. Breast, right, 6:30 4 cm FN, 2.2 cm spiculated mass; ultrasound-guided biopsy: - Invasive ductal carcinoma, poorly-differentiated associated with necrosis, measuring at least 12 mm in this material (see note) 2. Breast, right, 8:00 10 cm FN, 0.9 cm solid mass; ultrasound-guided biopsy: - Invasive ductal carcinoma, poorly-differentiated associated with mild lymphoid infiltrate, measuring at least 7 mm in this material, morphologically similar to part 1 3. 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 The findings are compatible with multicentric breast cancer involving at least 2 quadrants over 9 cm region. There are additional highly suspicious microcalcifications identified between the calcifications sampled and the 8 o'clock axis posterior mass. Appropriate action should be taken for the multi centric breast cancer.   6/2/23 ([facility]) b/l breast MRI: multicentric right breast malignancy, appearance of the MRI closely approximates appearance on post-biopsy imaging either the sites of concern spanned 8.7 x .3 x 8.1cm, dominant tumor 5-6:00 axis abuts but does not demonstrate skin involvement. No adenopathy. 2 indeterminate findings in the left breast 2:00 and 6:00 recommend 2 site left breast MRI biopsy. BIRADS 4   6/14/23 ([facility]) MR biopsy x2: 1. The left 6:00 axis 6 mm enhancing mass yielded DCIS, solid type with intermediate nuclear grade. Pathology results indicate that the specimen is malignant and concordant with imaging. This finding is delineated by an hourglass shaped clip which is medially displaced. ADDENDUM: Estrogen receptor 100% 3+ POSITIVE; Progesterone receptor INSUFFICIENT TISSUE 2. The pathology of the left 9:30 axis 6 mm clumped nonmass enhancement yielded intraductal papilloma with atypia. Pathology results indicate that this specimen is high risk and concordant with imaging. If breast conservation is performed for the left breast DCIS then surgical excisional biopsy would be recommended for this finding. **In addition, given the patient's diagnosis of left breast DCIS, MR guided core biopsy of the linear nonmass enhancement within the left medial posterior breast can be attempted, if breast conservation is desired. If this is not amenable to MR guided core biopsy due to its medial posterior location, then clip placement can be performed for possible needle localization and excision at the time of the left breast lumpectomy for DCIS and excisional biopsy for atypical papilloma.  [facility] 1 Left breast medial posterior, MRI guided core biopsies: Fibroadenoma. Fibrocystic changes including reactive changes most likely due to cyst rupture. IMPRESSION: These results are benign and concordant. RECOMMENDATION: Surgical or oncologic management.  10/5/2023 ([facility]) bilateral breast MRI: Complete imaging response to neoadjuvant chemotherapy.  Unremarkable left breast.  BI-RADS 6.  12/5/23 ([facility]) 1 Right breast mastectomy:- No residual carcinoma identified.- Benign breast tissue with three separate areas of densely fibrotic breast tissue with biopsy site changes, consistent with tumor bed, associated with calcifications.- Unremarkable skin and nipple. 2 Right axillary tail, excision:- Benign fibroadipose tissue. 3 Right axillary sentinel lymph nodes, biopsy:- Two lymph nodes, negative for tumor (0/2). 4 Left axillary lymph node, biopsy:- One lymph node, negative for tumor (0/1). 5 Right breast skin short superior long lateral, excision:- Unremarkable skin and subcutis. 6 Left Breast Mastectomy:- No residual carcinoma identified.- Benign breast tissue with two separate biopsy sites.- Focal atypical duct hyperplasia involving intraductal papilloma.- Unremarkable skin and nipple. 7 Left axillary tail, excision: - Benign adipose tissue. pT0pN0

## 2024-03-15 NOTE — ASSESSMENT
[FreeTextEntry1] : 68-year-old female s/p b/l mastectomy 12/5/23 with right breast multicentric invasive ductal carcinoma, ER negative, OH negative, HER2 positive and left breast DCIS and intraductal papilloma status post neoadjuvant chemotherapy with a complete imaging response

## 2024-03-15 NOTE — HISTORY OF PRESENT ILLNESS
[FreeTextEntry1] : Tanvi is a 67 yo female presents for follow-up evaluation of multicentric right breast invasive ductal carcinoma ER/IA negative, HER-2 positive and left breast DCIS (ER+ IA insufficient) s/p bilateral mastectomies with tissue expander reconstruction on 12/5/2023. Immediate post-op course was complicated by hematoma which underwent evacuation on 12/5/23. Implant exchange is planned for later this month with Dr. Phelan. She is followed by Dr. Marsh and medical oncology, and is currently undergoing adjuvant treatment. She is also followed by palliative care for pain management/neuropathy.  Cancer history: patient underwent screening mammogram on 4/13/2023 (she missed screening imaging in 2022 due to an illness in the family). Subsequent workup revealed IDC (ER/IA negative HER2 positive) at a 2.2cm mass at 6:30n4, a 0.9cm mass at 8n10, and microinvasive carcinoma with DCIS at 5:00-6:00, highly suspicious calcifications were identified between the calcifications sampled and the 8:00 posterior mass. The findings are compatible with multicentric breast cancer involving at least 2 quadrants and spanning over 9 cm. Patient completed a breast MRI on 6/2/23, revealing extensive disease on the right breast with the dominant tumor abutting the skin, and two sites of suspicion on the left breast recommended for MRI biopsies. The biopsies were completed on 6/14/23, the left 6n6 mass yielded DCIS (ER+ IA insufficient tissue) and the left 9:30n6 mass yielded an intraductal papilloma with atypia. Of note, the area biopsied with the DCIS has a clip that had migrated about 4 cm away. An additional left MRI biopsy was recommended and completed on 6/27/23, which revealed a fibroadenoma, benign and concordant. Patient completed neoadjuvant chemotherapy in September 2023 she underwent a bilateral breast MRI for post neoadjuvant chemotherapy assessment which showed complete imaging response no enhancement in either breast no axillary adenopathy bilaterally. Patient underwent bilateral mastectomy on 12/5/2023 with tissue expander reconstruction with Dr. Phelan. Final surgical path yielded no residual carcinoma, 0/2 right axillary lymph nodes negative 0/1 left axillary lymph nodes negative pT0pN0. Case is discussed with Dr. Montgomery and radiation oncology adjuvant treatment with radiation was not recommended.  Other significant medical history: Patient was previously followed by a cardiologist for CAD, has not seen a cardiologist in 2018. Patient was previously seen by a neurologist for workup of possible brain aneurysm, but was not deemed an aneurysm and was discharged from their care.

## 2024-03-15 NOTE — PHYSICAL EXAM
[Normocephalic] : normocephalic [EOMI] : extra ocular movement intact [Supple] : supple [No Supraclavicular Adenopathy] : no supraclavicular adenopathy [Examined in the supine and seated position] : examined in the supine and seated position [No dominant masses] : no dominant masses in right breast  [No dominant masses] : no dominant masses left breast [No Rashes] : no rashes [No Axillary Lymphadenopathy] : no left axillary lymphadenopathy [de-identified] : B/L incisions C/D/I, BRADY drain in place with minimal serosanguinous fluid [de-identified] : Well-healed mastectomy incision [de-identified] : Well-healed mastectomy incision

## 2024-03-15 NOTE — PLAN
[TextEntry] : Patient to perform self breast exams as instructed in the office Patient to continue to follow-up with medical oncology Patient to follow-up in 1 year for clinical breast exam

## 2024-03-18 ENCOUNTER — APPOINTMENT (OUTPATIENT)
Dept: HEMATOLOGY ONCOLOGY | Facility: CLINIC | Age: 69
End: 2024-03-18
Payer: MEDICARE

## 2024-03-18 LAB
ALBUMIN SERPL ELPH-MCNC: 3.9 G/DL
ALP BLD-CCNC: 92 U/L
ALT SERPL-CCNC: 18 U/L
ANION GAP SERPL CALC-SCNC: 7 MMOL/L
AST SERPL-CCNC: 25 U/L
BILIRUB SERPL-MCNC: 0.6 MG/DL
BUN SERPL-MCNC: 16 MG/DL
CALCIUM SERPL-MCNC: 9.4 MG/DL
CHLORIDE SERPL-SCNC: 111 MMOL/L
CO2 SERPL-SCNC: 27 MMOL/L
CREAT SERPL-MCNC: 0.8 MG/DL
EGFR: 79 ML/MIN/1.73M2
GLUCOSE SERPL-MCNC: 105 MG/DL
POTASSIUM SERPL-SCNC: 4.5 MMOL/L
PROT SERPL-MCNC: 7.3 G/DL
SODIUM SERPL-SCNC: 145 MMOL/L

## 2024-03-18 PROCEDURE — 90834 PSYTX W PT 45 MINUTES: CPT | Mod: 95

## 2024-03-20 ENCOUNTER — APPOINTMENT (OUTPATIENT)
Dept: PLASTIC SURGERY | Facility: CLINIC | Age: 69
End: 2024-03-20

## 2024-03-22 ENCOUNTER — RESULT REVIEW (OUTPATIENT)
Age: 69
End: 2024-03-22

## 2024-03-22 ENCOUNTER — OUTPATIENT (OUTPATIENT)
Dept: OUTPATIENT SERVICES | Facility: HOSPITAL | Age: 69
LOS: 1 days | End: 2024-03-22
Payer: MEDICARE

## 2024-03-22 DIAGNOSIS — Z90.49 ACQUIRED ABSENCE OF OTHER SPECIFIED PARTS OF DIGESTIVE TRACT: Chronic | ICD-10-CM

## 2024-03-22 DIAGNOSIS — Z96.649 PRESENCE OF UNSPECIFIED ARTIFICIAL HIP JOINT: Chronic | ICD-10-CM

## 2024-03-22 DIAGNOSIS — Z98.890 OTHER SPECIFIED POSTPROCEDURAL STATES: Chronic | ICD-10-CM

## 2024-03-22 DIAGNOSIS — C50.911 MALIGNANT NEOPLASM OF UNSPECIFIED SITE OF RIGHT FEMALE BREAST: ICD-10-CM

## 2024-03-22 DIAGNOSIS — Z90.89 ACQUIRED ABSENCE OF OTHER ORGANS: Chronic | ICD-10-CM

## 2024-03-22 PROCEDURE — 93306 TTE W/DOPPLER COMPLETE: CPT

## 2024-03-22 PROCEDURE — 93306 TTE W/DOPPLER COMPLETE: CPT | Mod: 26

## 2024-03-22 PROCEDURE — 93356 MYOCRD STRAIN IMG SPCKL TRCK: CPT

## 2024-03-25 ENCOUNTER — TRANSCRIPTION ENCOUNTER (OUTPATIENT)
Age: 69
End: 2024-03-25

## 2024-03-25 ENCOUNTER — APPOINTMENT (OUTPATIENT)
Dept: HEMATOLOGY ONCOLOGY | Facility: CLINIC | Age: 69
End: 2024-03-25
Payer: COMMERCIAL

## 2024-03-25 VITALS
OXYGEN SATURATION: 97 % | TEMPERATURE: 98 F | RESPIRATION RATE: 16 BRPM | HEIGHT: 66 IN | SYSTOLIC BLOOD PRESSURE: 131 MMHG | HEART RATE: 79 BPM | DIASTOLIC BLOOD PRESSURE: 72 MMHG | WEIGHT: 178.57 LBS

## 2024-03-25 PROCEDURE — 90834 PSYTX W PT 45 MINUTES: CPT | Mod: 95

## 2024-03-25 RX ORDER — VITAMIN E 100 UNIT
0 CAPSULE ORAL
Refills: 0 | DISCHARGE

## 2024-03-25 RX ORDER — CHOLECALCIFEROL (VITAMIN D3) 125 MCG
0 CAPSULE ORAL
Refills: 0 | DISCHARGE

## 2024-03-25 NOTE — ASU PATIENT PROFILE, ADULT - FALL HARM RISK - HARM RISK INTERVENTIONS
Communicate Risk of Fall with Harm to all staff/Reinforce activity limits and safety measures with patient and family/Tailored Fall Risk Interventions/Visual Cue: Yellow wristband and red socks/Bed in lowest position, wheels locked, appropriate side rails in place/Call bell, personal items and telephone in reach/Instruct patient to call for assistance before getting out of bed or chair/Non-slip footwear when patient is out of bed/[place] to call system/Physically safe environment - no spills, clutter or unnecessary equipment/Purposeful Proactive Rounding/Room/bathroom lighting operational, light cord in reach Assistance with ambulation/Assistance OOB with selected safe patient handling equipment/Communicate Risk of Fall with Harm to all staff/Discuss with provider need for PT consult/Monitor gait and stability/Provide patient with walking aids - walker, cane, crutches/Reinforce activity limits and safety measures with patient and family/Tailored Fall Risk Interventions/Visual Cue: Yellow wristband and red socks/Bed in lowest position, wheels locked, appropriate side rails in place/Call bell, personal items and telephone in reach/Instruct patient to call for assistance before getting out of bed or chair/Non-slip footwear when patient is out of bed/[place] to call system/Physically safe environment - no spills, clutter or unnecessary equipment/Purposeful Proactive Rounding/Room/bathroom lighting operational, light cord in reach

## 2024-03-26 ENCOUNTER — TRANSCRIPTION ENCOUNTER (OUTPATIENT)
Age: 69
End: 2024-03-26

## 2024-03-26 ENCOUNTER — RESULT REVIEW (OUTPATIENT)
Age: 69
End: 2024-03-26

## 2024-03-26 ENCOUNTER — OUTPATIENT (OUTPATIENT)
Dept: OUTPATIENT SERVICES | Facility: HOSPITAL | Age: 69
LOS: 1 days | Discharge: HOME | End: 2024-03-26
Payer: MEDICARE

## 2024-03-26 VITALS — RESPIRATION RATE: 16 BRPM

## 2024-03-26 DIAGNOSIS — Z98.890 OTHER SPECIFIED POSTPROCEDURAL STATES: Chronic | ICD-10-CM

## 2024-03-26 DIAGNOSIS — Z90.49 ACQUIRED ABSENCE OF OTHER SPECIFIED PARTS OF DIGESTIVE TRACT: Chronic | ICD-10-CM

## 2024-03-26 DIAGNOSIS — Z90.89 ACQUIRED ABSENCE OF OTHER ORGANS: Chronic | ICD-10-CM

## 2024-03-26 DIAGNOSIS — Z96.649 PRESENCE OF UNSPECIFIED ARTIFICIAL HIP JOINT: Chronic | ICD-10-CM

## 2024-03-26 PROCEDURE — C1789: CPT

## 2024-03-26 PROCEDURE — 88300 SURGICAL PATH GROSS: CPT | Mod: 26,59

## 2024-03-26 PROCEDURE — 88305 TISSUE EXAM BY PATHOLOGIST: CPT

## 2024-03-26 PROCEDURE — 88300 SURGICAL PATH GROSS: CPT

## 2024-03-26 PROCEDURE — 11970 RPLCMT TISS XPNDR PERM IMPLT: CPT | Mod: 50

## 2024-03-26 PROCEDURE — 19380 REVJ RECONSTRUCTED BREAST: CPT | Mod: 50

## 2024-03-26 PROCEDURE — 19380 REVJ RECONSTRUCTED BREAST: CPT | Mod: 50,59

## 2024-03-26 PROCEDURE — 88305 TISSUE EXAM BY PATHOLOGIST: CPT | Mod: 26

## 2024-03-26 PROCEDURE — 88302 TISSUE EXAM BY PATHOLOGIST: CPT | Mod: 26

## 2024-03-26 PROCEDURE — 88302 TISSUE EXAM BY PATHOLOGIST: CPT

## 2024-03-26 DEVICE — IMPLANTABLE DEVICE: Type: IMPLANTABLE DEVICE | Site: BILATERAL | Status: FUNCTIONAL

## 2024-03-26 RX ORDER — OXYCODONE HYDROCHLORIDE 5 MG/1
5 TABLET ORAL EVERY 6 HOURS
Refills: 0 | Status: DISCONTINUED | OUTPATIENT
Start: 2024-03-26 | End: 2024-03-26

## 2024-03-26 RX ORDER — ONDANSETRON 8 MG/1
4 TABLET, FILM COATED ORAL EVERY 4 HOURS
Refills: 0 | Status: DISCONTINUED | OUTPATIENT
Start: 2024-03-26 | End: 2024-03-26

## 2024-03-26 RX ORDER — HYDROMORPHONE HYDROCHLORIDE 2 MG/ML
0.25 INJECTION INTRAMUSCULAR; INTRAVENOUS; SUBCUTANEOUS
Refills: 0 | Status: DISCONTINUED | OUTPATIENT
Start: 2024-03-26 | End: 2024-03-26

## 2024-03-26 RX ORDER — OXYCODONE AND ACETAMINOPHEN 5; 325 MG/1; MG/1
1 TABLET ORAL
Qty: 12 | Refills: 0
Start: 2024-03-26 | End: 2024-03-28

## 2024-03-26 NOTE — ASU DISCHARGE PLAN (ADULT/PEDIATRIC) - CARE PROVIDER_API CALL
Caridad Phelan  Plastic Surgery  [address] [place] [id number]  Phone: [phone]  Fax: [phone]  Follow Up Time: 1 week

## 2024-03-26 NOTE — ASU DISCHARGE PLAN (ADULT/PEDIATRIC) - PROCEDURE
Explant of Tissue Exapnders, Breast Reconstruction with Silicone Implants Explant of Tissue Expanders, Breast Reconstruction with Silicone Implants

## 2024-03-26 NOTE — ASU DISCHARGE PLAN (ADULT/PEDIATRIC) - NS MD DC FALL RISK RISK
For information on Fall & Injury Prevention, visit: [email or web address] OR  [email or web address] OR  https://www.cdc.gov/steadi/patient.html Detail Level: Zone Detail Level: Simple

## 2024-03-26 NOTE — BRIEF OPERATIVE NOTE - OPERATION/FINDINGS
Horizontal incision over prior scar. Dissection through subcutaneous tissues to explant tissue expander. Cavity irrigated and IMF re-created with ethibond suture. B/L silicone implants placed. B/L 15 Fr BRADY drains placed. Incision closed in layers and dermabond.

## 2024-04-01 ENCOUNTER — APPOINTMENT (OUTPATIENT)
Dept: HEMATOLOGY ONCOLOGY | Facility: CLINIC | Age: 69
End: 2024-04-01
Payer: COMMERCIAL

## 2024-04-01 LAB — SURGICAL PATHOLOGY STUDY: SIGNIFICANT CHANGE UP

## 2024-04-01 PROCEDURE — 90832 PSYTX W PT 30 MINUTES: CPT | Mod: 95

## 2024-04-04 ENCOUNTER — APPOINTMENT (OUTPATIENT)
Dept: PLASTIC SURGERY | Facility: CLINIC | Age: 69
End: 2024-04-04
Payer: MEDICARE

## 2024-04-04 PROCEDURE — 99024 POSTOP FOLLOW-UP VISIT: CPT

## 2024-04-04 NOTE — HISTORY OF PRESENT ILLNESS
[FreeTextEntry1] : 67 y/o female presents 4 months s/p bilateral tissue expander reconstruction and return to OR POD0 for hematoma evacuation on 12/05/2023.  Denies any f/c/n/v. Patient took half an oxycodone yesterday and takes cannabis orally at night. She has 2 BRADY drains, ready for removal  PE: Incisions well healed, no collections or s/sx of infection, right more projected than left. Bilateral BRADY drains removed.  Totals: Right: 500 cc Left: 450 cc  These are 500 cc expanders.  RTC in 1 week for Dr. Phelan

## 2024-04-08 ENCOUNTER — APPOINTMENT (OUTPATIENT)
Dept: HEMATOLOGY ONCOLOGY | Facility: CLINIC | Age: 69
End: 2024-04-08
Payer: COMMERCIAL

## 2024-04-08 PROCEDURE — 90834 PSYTX W PT 45 MINUTES: CPT | Mod: 95

## 2024-04-10 ENCOUNTER — APPOINTMENT (OUTPATIENT)
Dept: INFUSION THERAPY | Facility: CLINIC | Age: 69
End: 2024-04-10

## 2024-04-10 ENCOUNTER — LABORATORY RESULT (OUTPATIENT)
Age: 69
End: 2024-04-10

## 2024-04-10 ENCOUNTER — OUTPATIENT (OUTPATIENT)
Dept: OUTPATIENT SERVICES | Facility: HOSPITAL | Age: 69
LOS: 1 days | End: 2024-04-10
Payer: MEDICARE

## 2024-04-10 ENCOUNTER — APPOINTMENT (OUTPATIENT)
Dept: HEMATOLOGY ONCOLOGY | Facility: CLINIC | Age: 69
End: 2024-04-10
Payer: COMMERCIAL

## 2024-04-10 VITALS
DIASTOLIC BLOOD PRESSURE: 75 MMHG | WEIGHT: 177.03 LBS | RESPIRATION RATE: 18 BRPM | HEART RATE: 94 BPM | SYSTOLIC BLOOD PRESSURE: 117 MMHG | HEIGHT: 66 IN | TEMPERATURE: 99 F | OXYGEN SATURATION: 95 %

## 2024-04-10 VITALS
SYSTOLIC BLOOD PRESSURE: 117 MMHG | WEIGHT: 179 LBS | HEIGHT: 66 IN | DIASTOLIC BLOOD PRESSURE: 75 MMHG | OXYGEN SATURATION: 94 % | HEART RATE: 94 BPM | BODY MASS INDEX: 28.77 KG/M2 | RESPIRATION RATE: 18 BRPM | TEMPERATURE: 98.6 F

## 2024-04-10 DIAGNOSIS — C50.912 MALIGNANT NEOPLASM OF UNSPECIFIED SITE OF LEFT FEMALE BREAST: ICD-10-CM

## 2024-04-10 DIAGNOSIS — Z98.890 OTHER SPECIFIED POSTPROCEDURAL STATES: Chronic | ICD-10-CM

## 2024-04-10 DIAGNOSIS — Z90.49 ACQUIRED ABSENCE OF OTHER SPECIFIED PARTS OF DIGESTIVE TRACT: Chronic | ICD-10-CM

## 2024-04-10 DIAGNOSIS — Z96.649 PRESENCE OF UNSPECIFIED ARTIFICIAL HIP JOINT: Chronic | ICD-10-CM

## 2024-04-10 DIAGNOSIS — Z90.89 ACQUIRED ABSENCE OF OTHER ORGANS: Chronic | ICD-10-CM

## 2024-04-10 PROCEDURE — 96401 CHEMO ANTI-NEOPL SQ/IM: CPT

## 2024-04-10 PROCEDURE — 99214 OFFICE O/P EST MOD 30 MIN: CPT

## 2024-04-10 RX ORDER — PERTUZUMAB, TRASTUZUMAB, AND HYALURONIDASE-ZZXF 600; 600; 2000 MG/10ML; MG/10ML; U/10ML
10 INJECTION, SOLUTION SUBCUTANEOUS ONCE
Refills: 0 | Status: COMPLETED | OUTPATIENT
Start: 2024-04-10 | End: 2024-04-10

## 2024-04-10 RX ADMIN — PERTUZUMAB, TRASTUZUMAB, AND HYALURONIDASE-ZZXF 10 MILLILITER(S): 600; 600; 2000 INJECTION, SOLUTION SUBCUTANEOUS at 14:58

## 2024-04-11 ENCOUNTER — APPOINTMENT (OUTPATIENT)
Dept: PLASTIC SURGERY | Facility: CLINIC | Age: 69
End: 2024-04-11
Payer: MEDICARE

## 2024-04-11 LAB
ALBUMIN SERPL ELPH-MCNC: 3.6 G/DL
ALP BLD-CCNC: 93 U/L
ALT SERPL-CCNC: 15 U/L
ANION GAP SERPL CALC-SCNC: 0 MMOL/L
AST SERPL-CCNC: 21 U/L
BILIRUB SERPL-MCNC: 0.7 MG/DL
BUN SERPL-MCNC: 13 MG/DL
CALCIUM SERPL-MCNC: 9.8 MG/DL
CHLORIDE SERPL-SCNC: 108 MMOL/L
CO2 SERPL-SCNC: 30 MMOL/L
CREAT SERPL-MCNC: 0.9 MG/DL
EGFR: 69 ML/MIN/1.73M2
GLUCOSE SERPL-MCNC: 122 MG/DL
POTASSIUM SERPL-SCNC: 4.8 MMOL/L
PROT SERPL-MCNC: 7.2 G/DL
SODIUM SERPL-SCNC: 138 MMOL/L

## 2024-04-11 PROCEDURE — 99024 POSTOP FOLLOW-UP VISIT: CPT

## 2024-04-11 NOTE — ASSESSMENT
[FreeTextEntry1] : Patient is a 69yo woman with h/o CAD (seen by cardiology in past for ?tachycardia as per patient but w/o follow up) referred by Dr. Roy for a newly diagnosed multicentric right breast invasive ductal carcinoma ER/OR negative, HER-2 positive. On MRI breast patient's disease spans 9cm with suspicious calcifications in between biopsied masses c/w IDC ER/OR- Her 2+.  cT3 (area of concern measuring 8.7cm on MRI, possibly multicentric) cN0 Stage IIB  Reviewed the biopsy results c/w ER/OR- Her2 positive breast cancer spanning a large part of patient's breast. I recommended neoadjuvant chemotherapy with dual Her 2 blockade with weekly Taxol/Carbo and Phesgo every 3 weeks over a 12 week period. I reviewed potential side effects including but not limited to allergic reactions, infusion reactions, fatigue, lowered immune system with lower blood counts, risk of neutropenic fever, needing growth factor support, nausea/vomiting, diarrhea, kidney and liver dysfunction, electrolyte abnormalities, peripheral neuropathy and reversible cardiomyopathy with dual her 2 agents. Patient expressed verbal understanding signing consent.  Reviewed curative intent goal of care and needing an MRI breast after neoadj treatment with surgery following the repeat MRI breast to assess response.  Plan prior to chemo: Echo - normal EF 5/2023. Chemo port by IR -placed at [facility].  6/21/23 Visit - Starting weekly carbo/taxol and phesgo [id number]. Compazine prn nausea. 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 I reviewed that treatment with Taxol is standard of care and asked her to keep a daily journal documenting her symptoms of numbness/tingling in her hands and feet. I reviewed with her that should she develop neuropathy we will have to re-evaluate risks/benefits of continuing treatment with weekly Taxol and may have to adjust therapy. Also discussed that this is dose dependent and usually occurs with later weeks of treatment. To monitor closely for any signs and symptoms of peripheral neuropathy. Patient had all her questions answered regarding treatment and signed consent form. Plan on checking Echos every 3 months.  7/5/23 Visit - with response to treatment on exam, w/o palpable lesion in right breast. To continue recommended treatment as planned with weekly carbo/taxol and phesgo every 21days. Compazine prn nausea. Stool negative for CDiff, and culture. To use immodium prn loose stools. reviewed instructions with patient, max dose per day 16mg. --reminded to take immodium with each loose stool. Prescribed Lomotil prn in addition to immodium. To monitor closely for any signs and symptoms of peripheral neuropathy. So far no signs/symptoms of neuropathy present.  7/26/23 - treatment held due to low platelet count of 57, to d/c carbo. 8/2/23 - treatment held due to poor oral PO intake due to oral mucositis/diarrhea, dehydration, also . To give IVF 1L NS today, hold treatment. Magic mouth wash TID before meals given mucositis.  8/9/23 To hold therapy today given ANC <1, reassured that treatment will be resumed next week w/o carbo. To drop carbo from treatment given cytopenias.  8/16/23 To proceed with weekly Taxol/Phesgo today. Blood counts improved. Carbo discontinued indefinitely.  8/23/23 To proceed with weekly Taxol today. Phesgo is every 21 days to be continued. Carbo discontinued due to low ANC in the past. To continue with treatment without Benadryl today but with keeping Dexamethasone at 10mg. Can decrease dose of Decadron to 4 with next cycle if no reaction this week.  9/13/23 Proceed with weekly taxol today (treatment #10/12). Phesgo is every 21 days to be continued. Blood counts stable. Recommended to patient to use immodium as prescribed plus lomotil. Red flags including >5 BM per day or fever 100.4F or higher to notify MD and seek medical attention. Advised that patient increase fluid intake (electrolyte based drinks, ensure etc). Will recommend nutrition see patient to help address weight loss. Next Echo before Oct 7th.  9/27/23 To hold Taxol today due to worsened neuropathy affecting function; she completed 11/12 Taxol treatments. To receive Phesgo #5 today. Use Lomotil prn diarrhea. Echo 9/26/23 with normal EF. MR breast to be scheduled, f/u with Dr Roy and surgical planning reviewed with patient is the next step in management. Also reviewed and stressed importance of follow up 2-3 weeks after surgery to assess need for additional systemic therapy with Kadcyla in case of lack of pCR at surgery; in case of pCR then Phesgo to be continued for a full year.  10/16/23  Pt improving clinically after completing systemic therapy with Taxol/Phesgo.  Grade 1 peripheral neuropathy present but improving.  MR breast with CR.  Scheduled to see Dr Roy for surgical planning today. Reviewed that patient will need systemic therapy after surgery (Phesgo if pCR and Kadcyla if not).   12/27/23 patient with pCR at surgery.  Genetics negative. With grade 2 neuropathy mostly in her hands and grade 1 in her feet. To prescribe Gabapentin 600mg QHS.  To resume Phesgo Q21 days after Echo is done to complete one full year of treatment.   1/31/24 Here for f/u and for 2nd post op Phesgo, first one given on 1/10.  Echo 12/29/23 EF 57% ok to proceed with treatment today.  pt is asking to have chemoport removed -- to set up with IR.  Reconstructive surgery planned for sometime in March.  Pt seeing Dr Canchola for pain management/neuropathy.   3/13/24 - To continue with adj Phesgo to complete one full year of HER2 therapy.  To f/u on EF on Echo at end of March.  Pt declining to see rad onc in f/u.   Visit 4/10/24: NADIA currently. With grade 1 periph neuropathy improving; pt is using CBD at night which has been helpful and allows patient to sleep through the night as well. Pt underwent removal of bilateral tissue expanders and replacement with permanent silicone implants on 3/26/24. Surgical drains were removed on 4/4/24.  Echo 3/22/24: EF 59% To continue Phesgo today and every 3 wks to complete the year of treatment, labs acceptable for treatment.  RTC for follow up and treatment 5/1/24

## 2024-04-11 NOTE — REVIEW OF SYSTEMS
[Fatigue] : fatigue [Diarrhea: Grade 4 - Life-threatening consequences; urgent intervention indicated] : Diarrhea: Grade 4 - Life-threatening consequences; urgent intervention indicated [Negative] : Allergic/Immunologic [FreeTextEntry9] : b/l breast soreness s/p surgery 12/5 [de-identified] : walks with a cane, continued neuropathy b/l hands and feet

## 2024-04-11 NOTE — PHYSICAL EXAM
[Restricted in physically strenuous activity but ambulatory and able to carry out work of a light or sedentary nature] : Status 1- Restricted in physically strenuous activity but ambulatory and able to carry out work of a light or sedentary nature, e.g., light house work, office work [Normal] : affect appropriate [de-identified] : s/p bilateral mastectomies with well healed surgical incisions, s/p reconstruction

## 2024-04-11 NOTE — HISTORY OF PRESENT ILLNESS
[FreeTextEntry1] : 67 y/o female presents 16 days s/p bilateral T/E to implant exchange with capsulectomy. Denies any f/c/n/v. Patient is not taking any pain medications. Restarted chemo yesterday  605cc SCF natrelle cohesive implants in place  PE Gen - NAD Bilateral breast incisions c/d/i, healing well. Left breast slightly more swollen/lateralized  A/P 69 yo F 2 weeks s/p implant exchange and revision. Healing well. Left breast appears slightly larger possibly in setting of greater swelling on that side which had hematoma complication. Patient otherwise feeling ok. Will plan to re-evaluate in 6 weeks

## 2024-04-11 NOTE — SURGICAL HISTORY
[de-identified] : 12/05/2023: bilateral tissue expander reconstruction [de-identified] : 03/26/2024:  bilateral T/E to implant exchange with capsulectomy

## 2024-04-11 NOTE — HISTORY OF PRESENT ILLNESS
[de-identified] : Here for f/u.  Reports ongoing fatigue and b/l neuropathy in hands and feet, states in her hands numbness/tingling improved.  Otherwise feeling ok.  States prefers not to take Gabapentin. She is now s/p reconstructive surgery, drains removed last Thursday 4/4/24  [FreeTextEntry1] : Patient is a 69yo woman with h/o CAD (seen by cardiology in past for ?tachycardia as per patient but w/o follow up) referred by Dr. Roy for a newly diagnosed multicentric right breast invasive ductal carcinoma ER/[place] negative, HER-2 positive. Here today for f/u and phesgo.   Patient presents to initial visit on 6/5/23 with her aunt.   The patient's history began when she underwent bilateral screening mammogram on 4/13/2023.  Of note, the patient's last breast imaging was in 2021. She states that she was dealing with illness in her mother and subsequent death of her mother in 2022.  She was found to have scattered fibroglandular density.  There was a spiculated mass with calcifications at the 6 to 7 o'clock position 5 cm from the nipple.  There was a smaller mass in the more posterior 7 o'clock position 11 cm from the nipple.  There was an asymmetry in the right breast 6 cm from the nipple.  There was also pleomorphic calcifications in the mid to posterior right breast for which additional imaging was recommended.  The patient underwent right breast diagnostic mammogram and sonogram on 4/26/2023.  She was found to have the findings described above for which biopsy was recommended of all the sites.  The patient underwent right breast 6:00 biopsy of the 2.2 cm spiculated mass which returned as invasive ductal carcinoma, poorly differentiated, ER negative, W[place] negative, HER2 positive.  She also underwent right breast ultrasound-guided biopsy of the 8:00 0.9 cm mass.  Pathology returned as invasive ductal carcinoma, ER negative, [place] negative, HER2 positive.  At the right breast 5 o'clock position stereotactic core biopsy was performed of the calcifications which returned as a microinvasive carcinoma with DCIS, high nuclear grade. Of note, there are highly suspicious calcifications identified between the calcifications sampled and the 8:00 posterior mass.  The findings are compatible with multicentric breast cancer involving at least 2 quadrants and spanning over 9 cm.    MRI breast on 6/2/23 showed extensive disease in the right breast with the dominant tumor abutting the skin, and two sites of suspicion on the left breast recommended for MRI biopsies. Area of skin abutting the tumor will be marked by Dr Montgomery from radiation oncology as per Dr Roy prior to pt starting chemo.  10/5/23 MR BREAST Complete imaging response to neoadjuvant chemotherapy RIGHT breast for multicentric breast cancer. Unremarkable LEFT breast. Treatment planning is in progress.

## 2024-04-15 ENCOUNTER — APPOINTMENT (OUTPATIENT)
Dept: HEMATOLOGY ONCOLOGY | Facility: CLINIC | Age: 69
End: 2024-04-15
Payer: MEDICARE

## 2024-04-15 DIAGNOSIS — F43.20 ADJUSTMENT DISORDER, UNSPECIFIED: ICD-10-CM

## 2024-04-15 PROCEDURE — 90834 PSYTX W PT 45 MINUTES: CPT | Mod: 95

## 2024-05-01 ENCOUNTER — APPOINTMENT (OUTPATIENT)
Dept: HEMATOLOGY ONCOLOGY | Facility: CLINIC | Age: 69
End: 2024-05-01
Payer: COMMERCIAL

## 2024-05-01 ENCOUNTER — LABORATORY RESULT (OUTPATIENT)
Age: 69
End: 2024-05-01

## 2024-05-01 ENCOUNTER — APPOINTMENT (OUTPATIENT)
Dept: INFUSION THERAPY | Facility: CLINIC | Age: 69
End: 2024-05-01

## 2024-05-01 ENCOUNTER — OUTPATIENT (OUTPATIENT)
Dept: OUTPATIENT SERVICES | Facility: HOSPITAL | Age: 69
LOS: 1 days | End: 2024-05-01
Payer: MEDICARE

## 2024-05-01 VITALS
WEIGHT: 181 LBS | BODY MASS INDEX: 29.09 KG/M2 | DIASTOLIC BLOOD PRESSURE: 72 MMHG | SYSTOLIC BLOOD PRESSURE: 123 MMHG | RESPIRATION RATE: 18 BRPM | HEART RATE: 82 BPM | HEIGHT: 66 IN | TEMPERATURE: 98.4 F | OXYGEN SATURATION: 96 %

## 2024-05-01 VITALS
DIASTOLIC BLOOD PRESSURE: 72 MMHG | HEART RATE: 82 BPM | WEIGHT: 181 LBS | HEIGHT: 68 IN | TEMPERATURE: 98 F | SYSTOLIC BLOOD PRESSURE: 123 MMHG | RESPIRATION RATE: 18 BRPM | OXYGEN SATURATION: 96 %

## 2024-05-01 DIAGNOSIS — Z90.49 ACQUIRED ABSENCE OF OTHER SPECIFIED PARTS OF DIGESTIVE TRACT: Chronic | ICD-10-CM

## 2024-05-01 DIAGNOSIS — G62.89 OTHER SPECIFIED POLYNEUROPATHIES: ICD-10-CM

## 2024-05-01 DIAGNOSIS — C50.911 MALIGNANT NEOPLASM OF UNSPECIFIED SITE OF RIGHT FEMALE BREAST: ICD-10-CM

## 2024-05-01 DIAGNOSIS — Z90.89 ACQUIRED ABSENCE OF OTHER ORGANS: Chronic | ICD-10-CM

## 2024-05-01 DIAGNOSIS — Z96.649 PRESENCE OF UNSPECIFIED ARTIFICIAL HIP JOINT: Chronic | ICD-10-CM

## 2024-05-01 DIAGNOSIS — Z98.890 OTHER SPECIFIED POSTPROCEDURAL STATES: Chronic | ICD-10-CM

## 2024-05-01 DIAGNOSIS — C50.912 MALIGNANT NEOPLASM OF UNSPECIFIED SITE OF LEFT FEMALE BREAST: ICD-10-CM

## 2024-05-01 PROCEDURE — 99214 OFFICE O/P EST MOD 30 MIN: CPT

## 2024-05-01 PROCEDURE — 96401 CHEMO ANTI-NEOPL SQ/IM: CPT

## 2024-05-01 RX ORDER — PERTUZUMAB, TRASTUZUMAB, AND HYALURONIDASE-ZZXF 600; 600; 2000 MG/10ML; MG/10ML; U/10ML
10 INJECTION, SOLUTION SUBCUTANEOUS ONCE
Refills: 0 | Status: COMPLETED | OUTPATIENT
Start: 2024-05-01 | End: 2024-05-01

## 2024-05-01 RX ADMIN — PERTUZUMAB, TRASTUZUMAB, AND HYALURONIDASE-ZZXF 10 MILLILITER(S): 600; 600; 2000 INJECTION, SOLUTION SUBCUTANEOUS at 12:08

## 2024-05-02 LAB
ALBUMIN SERPL ELPH-MCNC: 3.5 G/DL
ALP BLD-CCNC: 85 U/L
ALT SERPL-CCNC: 17 U/L
ANION GAP SERPL CALC-SCNC: 4 MMOL/L
AST SERPL-CCNC: 21 U/L
BILIRUB SERPL-MCNC: 0.8 MG/DL
BUN SERPL-MCNC: 14 MG/DL
CALCIUM SERPL-MCNC: 9.4 MG/DL
CHLORIDE SERPL-SCNC: 111 MMOL/L
CO2 SERPL-SCNC: 29 MMOL/L
CREAT SERPL-MCNC: 0.9 MG/DL
EGFR: 69 ML/MIN/1.73M2
GLUCOSE SERPL-MCNC: 110 MG/DL
POTASSIUM SERPL-SCNC: 4.4 MMOL/L
PROT SERPL-MCNC: 6.9 G/DL
SODIUM SERPL-SCNC: 144 MMOL/L

## 2024-05-09 ENCOUNTER — APPOINTMENT (OUTPATIENT)
Dept: PLASTIC SURGERY | Facility: CLINIC | Age: 69
End: 2024-05-09
Payer: MEDICARE

## 2024-05-09 DIAGNOSIS — Z42.1 ENCOUNTER FOR BREAST RECONSTRUCTION FOLLOWING MASTECTOMY: ICD-10-CM

## 2024-05-09 PROCEDURE — 99024 POSTOP FOLLOW-UP VISIT: CPT

## 2024-05-09 RX ORDER — OXYCODONE AND ACETAMINOPHEN 5; 325 MG/1; MG/1
5-325 TABLET ORAL
Qty: 20 | Refills: 0 | Status: DISCONTINUED | COMMUNITY
Start: 2024-03-25 | End: 2024-05-09

## 2024-05-10 NOTE — SURGICAL HISTORY
[de-identified] : 12/05/2023: bilateral tissue expander reconstruction [de-identified] : 03/26/2024:  bilateral T/E to implant exchange with capsulectomy

## 2024-05-10 NOTE — HISTORY OF PRESENT ILLNESS
[FreeTextEntry1] : 67 y/o female presents 6 weeks s/p bilateral T/E to implant exchange with capsulectomy on 03/26/2024. Denies any f/c/n/v. Patient is not taking any pain medications. Patient c/o left is bigger than the right. Patient started receiving phesgo injection, 5/13 treatments completed.  605cc SCF natrelle cohesive implants in place  PE Gen - NAD Bilateral breast incisions well healed. B/L NAC surgically absent. Left breast with more subcutaneous thickness lateral aspect of left breast compared to right.   A/P 69 yo F 2 weeks s/p implant exchange and revision. Healing well. Left breast appears slightly larger. Discussed possible revision with liposuction to left lateral breast with excision vs exchange for smaller implant.  Patient otherwise feeling ok.  Rx for PT given RTC PRN or once phesgo tx is completed

## 2024-05-15 PROBLEM — C50.911 HER2-POSITIVE CARCINOMA OF RIGHT BREAST: Status: ACTIVE | Noted: 2023-05-23

## 2024-05-15 PROBLEM — G62.89 OTHER POLYNEUROPATHY: Status: ACTIVE | Noted: 2023-09-27

## 2024-05-15 NOTE — PHYSICAL EXAM
[Restricted in physically strenuous activity but ambulatory and able to carry out work of a light or sedentary nature] : Status 1- Restricted in physically strenuous activity but ambulatory and able to carry out work of a light or sedentary nature, e.g., light house work, office work [Normal] : affect appropriate [de-identified] : s/p bilateral mastectomies with well healed surgical incisions, s/p reconstruction no difficulties

## 2024-05-15 NOTE — HISTORY OF PRESENT ILLNESS
[de-identified] : Here for f/u.  Reports improved energy and b/l neuropathy in hands and feet that continues to improve. Otherwise feeling ok.  She is now s/p reconstructive surgery, drains removed last Thursday 4/4/24 Mentions a few scattered episodes of diarrhea. Resumed playing the drums. [FreeTextEntry1] : Patient is a 67yo woman with h/o CAD (seen by cardiology in past for ?tachycardia as per patient but w/o follow up) referred by Dr. 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 Here today for f/u and phesgo.   Patient presents to initial visit on 6/5/23 with her aunt.   The patient's history began when she underwent bilateral screening mammogram on 4/13/2023.  Of note, the patient's last breast imaging was in 2021. She states that she was dealing with illness in her mother and subsequent death of her mother in 2022.  She was found to have scattered fibroglandular density.  There was a spiculated mass with calcifications at the 6 to 7 o'clock position 5 cm from the nipple.  There was a smaller mass in the more posterior 7 o'clock position 11 cm from the nipple.  There was an asymmetry in the right breast 6 cm from the nipple.  There was also pleomorphic calcifications in the mid to posterior right breast for which additional imaging was recommended.  The patient underwent right breast diagnostic mammogram and sonogram on 4/26/2023.  She was found to have the findings described above for which biopsy was recommended of all the sites.  The patient underwent right breast 6:00 biopsy of the 2.2 cm spiculated mass which returned as invasive ductal carcinoma, poorly differentiated, ER negative, RI negative, HER2 positive.  She also underwent right breast ultrasound-guided biopsy of the 8:00 0.9 cm mass.  Pathology returned as invasive ductal carcinoma, ER negative, RI negative, HER2 positive.  At the right breast 5 o'clock position stereotactic core biopsy was performed of the calcifications which returned as a microinvasive carcinoma with DCIS, high nuclear grade. Of note, there are highly suspicious calcifications identified between the calcifications sampled and the 8:00 posterior mass.  The findings are compatible with multicentric breast cancer involving at least 2 quadrants and spanning over 9 cm.    MRI breast on 6/2/23 showed extensive disease in the right breast with the dominant tumor abutting the skin, and two sites of suspicion on the left breast recommended for MRI biopsies. Area of skin abutting the tumor will be marked by Dr Montgomery from radiation oncology as per Dr Roy prior to pt starting chemo.  10/5/23 MR BREAST Complete imaging response to neoadjuvant chemotherapy RIGHT breast for multicentric breast cancer. Unremarkable LEFT breast. Treatment planning is in progress.

## 2024-05-15 NOTE — ASSESSMENT
[FreeTextEntry1] : Patient is a 67yo woman with h/o CAD (seen by cardiology in past for ?tachycardia as per patient but w/o follow up) referred by Dr. 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 On MRI breast patient's disease spans 9cm with suspicious calcifications in between biopsied masses c/w IDC ER/VT- Her 2+.  cT3 (area of concern measuring 8.7cm on MRI, possibly multicentric) cN0 Stage IIB  Reviewed the biopsy results c/w ER/VT- Her2 positive breast cancer spanning a large part of patient's breast. I recommended neoadjuvant chemotherapy with dual Her 2 blockade with weekly Taxol/Carbo and Phesgo every 3 weeks over a 12 week period. I reviewed potential side effects including but not limited to allergic reactions, infusion reactions, fatigue, lowered immune system with lower blood counts, risk of neutropenic fever, needing growth factor support, nausea/vomiting, diarrhea, kidney and liver dysfunction, electrolyte abnormalities, peripheral neuropathy and reversible cardiomyopathy with dual her 2 agents. Patient expressed verbal understanding signing consent.  Reviewed curative intent goal of care and needing an MRI breast after neoadj treatment with surgery following the repeat MRI breast to assess response.  Plan prior to chemo: Echo - normal EF 5/2023. Chemo port by IR -placed at [facility] IR.  6/21/23 Visit - Starting weekly carbo/taxol and phesgo [id number]. Compazine prn nausea. 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 I reviewed that treatment with Taxol is standard of care and asked her to keep a daily journal documenting her symptoms of numbness/tingling in her hands and feet. I reviewed with her that should she develop neuropathy we will have to re-evaluate risks/benefits of continuing treatment with weekly Taxol and may have to adjust therapy. Also discussed that this is dose dependent and usually occurs with later weeks of treatment. To monitor closely for any signs and symptoms of peripheral neuropathy. Patient had all her questions answered regarding treatment and signed consent form. Plan on checking Echos every 3 months.  7/5/23 Visit - with response to treatment on exam, w/o palpable lesion in right breast. To continue recommended treatment as planned with weekly carbo/taxol and phesgo every 21days. Compazine prn nausea. Stool negative for CDiff, and culture. To use immodium prn loose stools. reviewed instructions with patient, max dose per day 16mg. --reminded to take immodium with each loose stool. Prescribed Lomotil prn in addition to immodium. To monitor closely for any signs and symptoms of peripheral neuropathy. So far no signs/symptoms of neuropathy present.  7/26/23 - treatment held due to low platelet count of 57, to d/c carbo. 8/2/23 - treatment held due to poor oral PO intake due to oral mucositis/diarrhea, dehydration, also . To give IVF 1L NS today, hold treatment. Magic mouth wash TID before meals given mucositis.  8/9/23 To hold therapy today given ANC <1, reassured that treatment will be resumed next week w/o carbo. To drop carbo from treatment given cytopenias.  8/16/23 To proceed with weekly Taxol/Phesgo today. Blood counts improved. Carbo discontinued indefinitely.  8/23/23 To proceed with weekly Taxol today. Phesgo is every 21 days to be continued. Carbo discontinued due to low ANC in the past. To continue with treatment without Benadryl today but with keeping Dexamethasone at 10mg. Can decrease dose of Decadron to 4 with next cycle if no reaction this week.  9/13/23 Proceed with weekly taxol today (treatment #10/12). Phesgo is every 21 days to be continued. Blood counts stable. Recommended to patient to use immodium as prescribed plus lomotil. Red flags including >5 BM per day or fever 100.4F or higher to notify MD and seek medical attention. Advised that patient increase fluid intake (electrolyte based drinks, ensure etc). Will recommend nutrition see patient to help address weight loss. Next Echo before Oct 7th.  9/27/23 To hold Taxol today due to worsened neuropathy affecting function; she completed 11/12 Taxol treatments. To receive Phesgo #5 today. Use Lomotil prn diarrhea. Echo 9/26/23 with normal EF. MR breast to be scheduled, f/u with Dr Roy and surgical planning reviewed with patient is the next step in management. Also reviewed and stressed importance of follow up 2-3 weeks after surgery to assess need for additional systemic therapy with Kadcyla in case of lack of pCR at surgery; in case of pCR then Phesgo to be continued for a full year.  10/16/23  Pt improving clinically after completing systemic therapy with Taxol/Phesgo.  Grade 1 peripheral neuropathy present but improving.  MR breast with CR.  Scheduled to see Dr Roy for surgical planning today. Reviewed that patient will need systemic therapy after surgery (Phesgo if pCR and Kadcyla if not).   12/27/23 patient with pCR at surgery.  Genetics negative. With grade 2 neuropathy mostly in her hands and grade 1 in her feet. To prescribe Gabapentin 600mg QHS.  To resume Phesgo Q21 days after Echo is done to complete one full year of treatment.   1/31/24 Here for f/u and for 2nd post op Phesgo, first one given on 1/10.  Echo 12/29/23 EF 57% ok to proceed with treatment today.  pt is asking to have chemoport removed -- to set up with IR.  Reconstructive surgery planned for sometime in March.  Pt seeing Dr Cacnhola for pain management/neuropathy.   3/13/24 - To continue with adj Phesgo to complete one full year of HER2 therapy.  To f/u on EF on Echo at end of March.  Pt declining to see rad onc in f/u.   Visit 4/10/24: NADIA currently. With grade 1 periph neuropathy improving; pt is using CBD at night which has been helpful and allows patient to sleep through the night as well. Pt underwent removal of bilateral tissue expanders and replacement with permanent silicone implants on 3/26/24. Surgical drains were removed on 4/4/24.  Echo 3/22/24: EF 59% To continue Phesgo today and every 3 wks to complete the year of treatment, labs acceptable for treatment. RTC for follow up and treatment 5/1/24  Visit 5/1/24: Tolerating therapy well with adj Phesgo currently. Here for Phesgo, labs within goal for treatment. Due for ECHO mid-June (last ECHO 3/22/24 EF 59%) Patient declined to return to radiation oncologist for radiation therapy in 3/2024 at prior visit. RTC in 3 weeks for treatment and next follow up with treatment scheduled for 6/12/24

## 2024-05-15 NOTE — REVIEW OF SYSTEMS
[Fatigue] : fatigue [Diarrhea: Grade 4 - Life-threatening consequences; urgent intervention indicated] : Diarrhea: Grade 4 - Life-threatening consequences; urgent intervention indicated [Negative] : Allergic/Immunologic [de-identified] : walks with a cane, continued neuropathy b/l hands and feet-slowly improving

## 2024-05-22 ENCOUNTER — APPOINTMENT (OUTPATIENT)
Dept: INFUSION THERAPY | Facility: CLINIC | Age: 69
End: 2024-05-22

## 2024-05-22 ENCOUNTER — OUTPATIENT (OUTPATIENT)
Dept: OUTPATIENT SERVICES | Facility: HOSPITAL | Age: 69
LOS: 1 days | End: 2024-05-22
Payer: MEDICARE

## 2024-05-22 DIAGNOSIS — Z98.890 OTHER SPECIFIED POSTPROCEDURAL STATES: Chronic | ICD-10-CM

## 2024-05-22 DIAGNOSIS — Z96.649 PRESENCE OF UNSPECIFIED ARTIFICIAL HIP JOINT: Chronic | ICD-10-CM

## 2024-05-22 DIAGNOSIS — Z90.89 ACQUIRED ABSENCE OF OTHER ORGANS: Chronic | ICD-10-CM

## 2024-05-22 DIAGNOSIS — C50.912 MALIGNANT NEOPLASM OF UNSPECIFIED SITE OF LEFT FEMALE BREAST: ICD-10-CM

## 2024-05-22 DIAGNOSIS — Z90.49 ACQUIRED ABSENCE OF OTHER SPECIFIED PARTS OF DIGESTIVE TRACT: Chronic | ICD-10-CM

## 2024-05-22 LAB
ALBUMIN SERPL ELPH-MCNC: 3.5 G/DL — SIGNIFICANT CHANGE UP (ref 3.3–5)
ALP SERPL-CCNC: 93 U/L — SIGNIFICANT CHANGE UP (ref 40–120)
ALT FLD-CCNC: 9 U/L — LOW (ref 10–45)
ANION GAP SERPL CALC-SCNC: 0 MMOL/L — LOW (ref 5–17)
AST SERPL-CCNC: 20 U/L — SIGNIFICANT CHANGE UP (ref 10–40)
BILIRUB SERPL-MCNC: 0.7 MG/DL — SIGNIFICANT CHANGE UP (ref 0.2–1.2)
BUN SERPL-MCNC: 16 MG/DL — SIGNIFICANT CHANGE UP (ref 7–23)
CALCIUM SERPL-MCNC: 9.2 MG/DL — SIGNIFICANT CHANGE UP (ref 8.4–10.5)
CHLORIDE SERPL-SCNC: 114 MMOL/L — HIGH (ref 96–108)
CO2 SERPL-SCNC: 28 MMOL/L — SIGNIFICANT CHANGE UP (ref 22–31)
CREAT SERPL-MCNC: 0.9 MG/DL — SIGNIFICANT CHANGE UP (ref 0.5–1.3)
EGFR: 69 ML/MIN/1.73M2 — SIGNIFICANT CHANGE UP
GLUCOSE SERPL-MCNC: 92 MG/DL — SIGNIFICANT CHANGE UP (ref 70–99)
HCT VFR BLD CALC: 38.9 % — SIGNIFICANT CHANGE UP (ref 34.5–45)
HGB BLD-MCNC: 12.6 G/DL — SIGNIFICANT CHANGE UP (ref 11.5–15.5)
LYMPHOCYTES # BLD AUTO: 1.7 K/UL — SIGNIFICANT CHANGE UP (ref 1–3.3)
LYMPHOCYTES # BLD AUTO: 28.4 % — SIGNIFICANT CHANGE UP (ref 13–44)
MCHC RBC-ENTMCNC: 27.8 PG — SIGNIFICANT CHANGE UP (ref 27–34)
MCHC RBC-ENTMCNC: 32.4 GM/DL — SIGNIFICANT CHANGE UP (ref 32–36)
MCV RBC AUTO: 85.9 FL — SIGNIFICANT CHANGE UP (ref 80–100)
NEUTROPHILS # BLD AUTO: 3.8 K/UL — SIGNIFICANT CHANGE UP (ref 1.8–7.4)
NEUTROPHILS NFR BLD AUTO: 64.1 % — SIGNIFICANT CHANGE UP (ref 43–77)
PLATELET # BLD AUTO: 222 K/UL — SIGNIFICANT CHANGE UP (ref 150–400)
POTASSIUM SERPL-MCNC: 4.2 MMOL/L — SIGNIFICANT CHANGE UP (ref 3.5–5.3)
POTASSIUM SERPL-SCNC: 4.2 MMOL/L — SIGNIFICANT CHANGE UP (ref 3.5–5.3)
PROT SERPL-MCNC: 6.9 G/DL — SIGNIFICANT CHANGE UP (ref 6–8.3)
RBC # BLD: 4.53 M/UL — SIGNIFICANT CHANGE UP (ref 3.8–5.2)
RBC # FLD: 14 % — SIGNIFICANT CHANGE UP (ref 10.3–14.5)
SODIUM SERPL-SCNC: 142 MMOL/L — SIGNIFICANT CHANGE UP (ref 135–145)
WBC # BLD: 5.9 K/UL — SIGNIFICANT CHANGE UP (ref 3.8–10.5)
WBC # FLD AUTO: 5.9 K/UL — SIGNIFICANT CHANGE UP (ref 3.8–10.5)

## 2024-05-22 PROCEDURE — 96401 CHEMO ANTI-NEOPL SQ/IM: CPT

## 2024-05-22 PROCEDURE — 80053 COMPREHEN METABOLIC PANEL: CPT

## 2024-05-22 PROCEDURE — 36415 COLL VENOUS BLD VENIPUNCTURE: CPT

## 2024-05-22 PROCEDURE — 85025 COMPLETE CBC W/AUTO DIFF WBC: CPT

## 2024-05-22 RX ORDER — PERTUZUMAB, TRASTUZUMAB, AND HYALURONIDASE-ZZXF 600; 600; 2000 MG/10ML; MG/10ML; U/10ML
10 INJECTION, SOLUTION SUBCUTANEOUS ONCE
Refills: 0 | Status: COMPLETED | OUTPATIENT
Start: 2024-05-22 | End: 2024-05-22

## 2024-05-22 RX ADMIN — PERTUZUMAB, TRASTUZUMAB, AND HYALURONIDASE-ZZXF 10 MILLILITER(S): 600; 600; 2000 INJECTION, SOLUTION SUBCUTANEOUS at 16:40

## 2024-06-07 ENCOUNTER — RESULT REVIEW (OUTPATIENT)
Age: 69
End: 2024-06-07

## 2024-06-07 ENCOUNTER — OUTPATIENT (OUTPATIENT)
Dept: OUTPATIENT SERVICES | Facility: HOSPITAL | Age: 69
LOS: 1 days | End: 2024-06-07
Payer: MEDICARE

## 2024-06-07 DIAGNOSIS — Z98.890 OTHER SPECIFIED POSTPROCEDURAL STATES: Chronic | ICD-10-CM

## 2024-06-07 DIAGNOSIS — Z90.49 ACQUIRED ABSENCE OF OTHER SPECIFIED PARTS OF DIGESTIVE TRACT: Chronic | ICD-10-CM

## 2024-06-07 DIAGNOSIS — Z90.89 ACQUIRED ABSENCE OF OTHER ORGANS: Chronic | ICD-10-CM

## 2024-06-07 DIAGNOSIS — C50.911 MALIGNANT NEOPLASM OF UNSPECIFIED SITE OF RIGHT FEMALE BREAST: ICD-10-CM

## 2024-06-07 DIAGNOSIS — Z96.649 PRESENCE OF UNSPECIFIED ARTIFICIAL HIP JOINT: Chronic | ICD-10-CM

## 2024-06-07 PROCEDURE — 93306 TTE W/DOPPLER COMPLETE: CPT

## 2024-06-07 PROCEDURE — 93356 MYOCRD STRAIN IMG SPCKL TRCK: CPT

## 2024-06-07 PROCEDURE — 93306 TTE W/DOPPLER COMPLETE: CPT | Mod: 26

## 2024-06-12 ENCOUNTER — OUTPATIENT (OUTPATIENT)
Dept: OUTPATIENT SERVICES | Facility: HOSPITAL | Age: 69
LOS: 1 days | End: 2024-06-12
Payer: MEDICARE

## 2024-06-12 ENCOUNTER — APPOINTMENT (OUTPATIENT)
Dept: INFUSION THERAPY | Facility: CLINIC | Age: 69
End: 2024-06-12

## 2024-06-12 ENCOUNTER — APPOINTMENT (OUTPATIENT)
Dept: HEMATOLOGY ONCOLOGY | Facility: CLINIC | Age: 69
End: 2024-06-12
Payer: COMMERCIAL

## 2024-06-12 VITALS
TEMPERATURE: 98 F | RESPIRATION RATE: 18 BRPM | OXYGEN SATURATION: 96 % | HEIGHT: 68 IN | DIASTOLIC BLOOD PRESSURE: 88 MMHG | SYSTOLIC BLOOD PRESSURE: 153 MMHG | WEIGHT: 177.03 LBS | HEART RATE: 90 BPM

## 2024-06-12 VITALS
SYSTOLIC BLOOD PRESSURE: 153 MMHG | HEART RATE: 90 BPM | HEIGHT: 66 IN | RESPIRATION RATE: 18 BRPM | BODY MASS INDEX: 28.45 KG/M2 | WEIGHT: 177 LBS | DIASTOLIC BLOOD PRESSURE: 98 MMHG | OXYGEN SATURATION: 96 % | TEMPERATURE: 98.5 F

## 2024-06-12 DIAGNOSIS — Z13.820 ENCOUNTER FOR SCREENING FOR OSTEOPOROSIS: ICD-10-CM

## 2024-06-12 DIAGNOSIS — Z90.13 ACQUIRED ABSENCE OF BILATERAL BREASTS AND NIPPLES: ICD-10-CM

## 2024-06-12 DIAGNOSIS — D05.12 INTRADUCTAL CARCINOMA IN SITU OF LEFT BREAST: ICD-10-CM

## 2024-06-12 DIAGNOSIS — Z96.649 PRESENCE OF UNSPECIFIED ARTIFICIAL HIP JOINT: Chronic | ICD-10-CM

## 2024-06-12 DIAGNOSIS — Z90.49 ACQUIRED ABSENCE OF OTHER SPECIFIED PARTS OF DIGESTIVE TRACT: Chronic | ICD-10-CM

## 2024-06-12 DIAGNOSIS — C50.912 MALIGNANT NEOPLASM OF UNSPECIFIED SITE OF LEFT FEMALE BREAST: ICD-10-CM

## 2024-06-12 DIAGNOSIS — G62.0 DRUG-INDUCED POLYNEUROPATHY: ICD-10-CM

## 2024-06-12 DIAGNOSIS — Z98.890 OTHER SPECIFIED POSTPROCEDURAL STATES: Chronic | ICD-10-CM

## 2024-06-12 DIAGNOSIS — Z90.89 ACQUIRED ABSENCE OF OTHER ORGANS: Chronic | ICD-10-CM

## 2024-06-12 DIAGNOSIS — C50.911 MALIGNANT NEOPLASM OF UNSPECIFIED SITE OF RIGHT FEMALE BREAST: ICD-10-CM

## 2024-06-12 LAB
ALBUMIN SERPL ELPH-MCNC: 3.6 G/DL
ALP BLD-CCNC: 108 U/L
ALT SERPL-CCNC: 18 U/L
ANION GAP SERPL CALC-SCNC: 7 MMOL/L
AST SERPL-CCNC: 26 U/L
BILIRUB SERPL-MCNC: 0.6 MG/DL
BUN SERPL-MCNC: 15 MG/DL
CALCIUM SERPL-MCNC: 9.2 MG/DL
CHLORIDE SERPL-SCNC: 107 MMOL/L
CO2 SERPL-SCNC: 30 MMOL/L
CREAT SERPL-MCNC: 0.8 MG/DL
EGFR: 79 ML/MIN/1.73M2
GLUCOSE SERPL-MCNC: 113 MG/DL
HCT VFR BLD CALC: 39.2 %
HGB BLD-MCNC: 12.8 G/DL
LYMPHOCYTES # BLD AUTO: 1.3 K/UL
LYMPHOCYTES NFR BLD AUTO: 26.1 %
MAN DIFF?: NO
MCHC RBC-ENTMCNC: 28.1 PG
MCHC RBC-ENTMCNC: 32.7 GM/DL
MCV RBC AUTO: 86 FL
NEUTROPHILS # BLD AUTO: 3.5 K/UL
NEUTROPHILS NFR BLD AUTO: 69.3 %
PLATELET # BLD AUTO: 225 K/UL
POTASSIUM SERPL-SCNC: 4.9 MMOL/L
PROT SERPL-MCNC: 7.2 G/DL
RBC # BLD: 4.56 M/UL
RBC # FLD: 13.5 %
SODIUM SERPL-SCNC: 144 MMOL/L
WBC # FLD AUTO: 5 K/UL

## 2024-06-12 PROCEDURE — 99214 OFFICE O/P EST MOD 30 MIN: CPT

## 2024-06-12 PROCEDURE — 96401 CHEMO ANTI-NEOPL SQ/IM: CPT

## 2024-06-12 RX ORDER — PERTUZUMAB, TRASTUZUMAB, AND HYALURONIDASE-ZZXF 600; 600; 2000 MG/10ML; MG/10ML; U/10ML
10 INJECTION, SOLUTION SUBCUTANEOUS ONCE
Refills: 0 | Status: COMPLETED | OUTPATIENT
Start: 2024-06-12 | End: 2024-06-12

## 2024-06-12 RX ADMIN — PERTUZUMAB, TRASTUZUMAB, AND HYALURONIDASE-ZZXF 10 MILLILITER(S): 600; 600; 2000 INJECTION, SOLUTION SUBCUTANEOUS at 13:52

## 2024-06-13 NOTE — REVIEW OF SYSTEMS
[Diarrhea: Grade 4 - Life-threatening consequences; urgent intervention indicated] : Diarrhea: Grade 4 - Life-threatening consequences; urgent intervention indicated [Negative] : Allergic/Immunologic [de-identified] : walks with a cane, continued neuropathy b/l hands and feet-slowly improving

## 2024-06-13 NOTE — ASSESSMENT
[FreeTextEntry1] : Patient is a 67yo woman with h/o CAD (seen by cardiology in past for ?tachycardia as per patient but w/o follow up) referred by Dr. 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 On MRI breast patient's disease spans 9cm with suspicious calcifications in between biopsied masses c/w IDC ER/[place]- Her 2+.  cT3 (area of concern measuring 8.7cm on MRI, possibly multicentric) cN0 Stage IIB  Reviewed the biopsy results c/w ER/[place]- Her2 positive breast cancer spanning a large part of patient's breast. I recommended neoadjuvant chemotherapy with dual Her 2 blockade with weekly Taxol/Carbo and Phesgo every 3 weeks over a 12 week period. I reviewed potential side effects including but not limited to allergic reactions, infusion reactions, fatigue, lowered immune system with lower blood counts, risk of neutropenic fever, needing growth factor support, nausea/vomiting, diarrhea, kidney and liver dysfunction, electrolyte abnormalities, peripheral neuropathy and reversible cardiomyopathy with dual her 2 agents. Patient expressed verbal understanding signing consent.  Reviewed curative intent goal of care and needing an MRI breast after neoadj treatment with surgery following the repeat MRI breast to assess response.  Plan prior to chemo: Echo - normal EF 5/2023. Chemo port by IR -placed at [facility] IR.  6/21/23 Visit - Starting weekly carbo/taxol and phesgo [id number]. Compazine prn nausea. 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 I reviewed that treatment with Taxol is standard of care and asked her to keep a daily journal documenting her symptoms of numbness/tingling in her hands and feet. I reviewed with her that should she develop neuropathy we will have to re-evaluate risks/benefits of continuing treatment with weekly Taxol and may have to adjust therapy. Also discussed that this is dose dependent and usually occurs with later weeks of treatment. To monitor closely for any signs and symptoms of peripheral neuropathy. Patient had all her questions answered regarding treatment and signed consent form. Plan on checking Echos every 3 months.  7/5/23 Visit - with response to treatment on exam, w/o palpable lesion in right breast. To continue recommended treatment as planned with weekly carbo/taxol and phesgo every 21days. Compazine prn nausea. Stool negative for CDiff, and culture. To use immodium prn loose stools. reviewed instructions with patient, max dose per day 16mg. --reminded to take immodium with each loose stool. Prescribed Lomotil prn in addition to immodium. To monitor closely for any signs and symptoms of peripheral neuropathy. So far no signs/symptoms of neuropathy present.  7/26/23 - treatment held due to low platelet count of 57, to d/c carbo. 8/2/23 - treatment held due to poor oral PO intake due to oral mucositis/diarrhea, dehydration, also . To give IVF 1L NS today, hold treatment. Magic mouth wash TID before meals given mucositis.  8/9/23 To hold therapy today given ANC <1, reassured that treatment will be resumed next week w/o carbo. To drop carbo from treatment given cytopenias.  8/16/23 To proceed with weekly Taxol/Phesgo today. Blood counts improved. Carbo discontinued indefinitely.  8/23/23 To proceed with weekly Taxol today. Phesgo is every 21 days to be continued. Carbo discontinued due to low ANC in the past. To continue with treatment without Benadryl today but with keeping Dexamethasone at 10mg. Can decrease dose of Decadron to 4 with next cycle if no reaction this week.  9/13/23 Proceed with weekly taxol today (treatment #10/12). Phesgo is every 21 days to be continued. Blood counts stable. Recommended to patient to use immodium as prescribed plus lomotil. Red flags including >5 BM per day or fever 100.4F or higher to notify MD and seek medical attention. Advised that patient increase fluid intake (electrolyte based drinks, ensure etc). Will recommend nutrition see patient to help address weight loss. Next Echo before Oct 7th.  9/27/23 To hold Taxol today due to worsened neuropathy affecting function; she completed 11/12 Taxol treatments. To receive Phesgo #5 today. Use Lomotil prn diarrhea. Echo 9/26/23 with normal EF. MR breast to be scheduled, f/u with Dr Roy and surgical planning reviewed with patient is the next step in management. Also reviewed and stressed importance of follow up 2-3 weeks after surgery to assess need for additional systemic therapy with Kadcyla in case of lack of pCR at surgery; in case of pCR then Phesgo to be continued for a full year.  10/16/23  Pt improving clinically after completing systemic therapy with Taxol/Phesgo.  Grade 1 peripheral neuropathy present but improving.  MR breast with CR.  Scheduled to see Dr Roy for surgical planning today. Reviewed that patient will need systemic therapy after surgery (Phesgo if pCR and Kadcyla if not).   12/27/23 patient with pCR at surgery.  Genetics negative. With grade 2 neuropathy mostly in her hands and grade 1 in her feet. To prescribe Gabapentin 600mg QHS.  To resume Phesgo Q21 days after Echo is done to complete one full year of treatment.   1/31/24 Here for f/u and for 2nd post op Phesgo, first one given on 1/10.  Echo 12/29/23 EF 57% ok to proceed with treatment today.  pt is asking to have chemoport removed -- to set up with IR.  Reconstructive surgery planned for sometime in March.  Pt seeing Dr Canchola for pain management/neuropathy.   3/13/24 - To continue with adj Phesgo to complete one full year of HER2 therapy.  To f/u on EF on Echo at end of March.  Pt declining to see rad onc in f/u.   Visit 4/10/24: NADIA currently. With grade 1 periph neuropathy improving; pt is using CBD at night which has been helpful and allows patient to sleep through the night as well. Pt underwent removal of bilateral tissue expanders and replacement with permanent silicone implants on 3/26/24. Surgical drains were removed on 4/4/24.  Echo 3/22/24: EF 59% To continue Phesgo today and every 3 wks to complete the year of treatment, labs acceptable for treatment. RTC for follow up and treatment 5/1/24  Visit 6/13/24: Tolerating therapy well with adj Phesgo currently. Here for Phesgo, labs within goal for treatment. Last ECHO 6/7/24 EF 59%) Patient declined to return to radiation oncologist for radiation therapy in 3/2024 at prior visit. RTC in 3 weeks for treatment and next follow up with treatment in 6 wks from now. Reviewed that patient will be seen by either Dr Garcia or Jalyn since I'm leaving [facility]. She was in agreement.

## 2024-06-13 NOTE — PHYSICAL EXAM
[Restricted in physically strenuous activity but ambulatory and able to carry out work of a light or sedentary nature] : Status 1- Restricted in physically strenuous activity but ambulatory and able to carry out work of a light or sedentary nature, e.g., light house work, office work [Normal] : affect appropriate [de-identified] : s/p bilateral mastectomies with well healed surgical incisions, s/p reconstruction

## 2024-06-13 NOTE — HISTORY OF PRESENT ILLNESS
[de-identified] : Here for f/u.  Reports improved energy and b/l neuropathy in hands and feet that continues to improve. Otherwise feeling ok.  She is now s/p reconstructive surgery, drains removed last Thursday 4/4/24 Mentions a few scattered episodes of diarrhea. Resumed playing the drums. [FreeTextEntry1] : Patient is a 67yo woman with h/o CAD (seen by cardiology in past for ?tachycardia as per patient but w/o follow up) referred by Dr. 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 Here today for f/u and phesgo.   Patient presents to initial visit on 6/5/23 with her aunt.   The patient's history began when she underwent bilateral screening mammogram on 4/13/2023.  Of note, the patient's last breast imaging was in 2021. She states that she was dealing with illness in her mother and subsequent death of her mother in 2022.  She was found to have scattered fibroglandular density.  There was a spiculated mass with calcifications at the 6 to 7 o'clock position 5 cm from the nipple.  There was a smaller mass in the more posterior 7 o'clock position 11 cm from the nipple.  There was an asymmetry in the right breast 6 cm from the nipple.  There was also pleomorphic calcifications in the mid to posterior right breast for which additional imaging was recommended.  The patient underwent right breast diagnostic mammogram and sonogram on 4/26/2023.  She was found to have the findings described above for which biopsy was recommended of all the sites.  The patient underwent right breast 6:00 biopsy of the 2.2 cm spiculated mass which returned as invasive ductal carcinoma, poorly differentiated, ER negative, [place] negative, HER2 positive.  She also underwent right breast ultrasound-guided biopsy of the 8:00 0.9 cm mass.  Pathology returned as invasive ductal carcinoma, ER negative, SC negative, HER2 positive.  At the right breast 5 o'clock position stereotactic core biopsy was performed of the calcifications which returned as a microinvasive carcinoma with DCIS, high nuclear grade. Of note, there are highly suspicious calcifications identified between the calcifications sampled and the 8:00 posterior mass.  The findings are compatible with multicentric breast cancer involving at least 2 quadrants and spanning over 9 cm.    MRI breast on 6/2/23 showed extensive disease in the right breast with the dominant tumor abutting the skin, and two sites of suspicion on the left breast recommended for MRI biopsies. Area of skin abutting the tumor will be marked by Dr Montgomery from radiation oncology as per Dr Roy prior to pt starting chemo.  10/5/23 MR BREAST Complete imaging response to neoadjuvant chemotherapy RIGHT breast for multicentric breast cancer. Unremarkable LEFT breast. Treatment planning is in progress.

## 2024-07-03 ENCOUNTER — APPOINTMENT (OUTPATIENT)
Dept: INFUSION THERAPY | Facility: CLINIC | Age: 69
End: 2024-07-03

## 2024-07-03 ENCOUNTER — OUTPATIENT (OUTPATIENT)
Dept: OUTPATIENT SERVICES | Facility: HOSPITAL | Age: 69
LOS: 1 days | End: 2024-07-03
Payer: MEDICARE

## 2024-07-03 VITALS
SYSTOLIC BLOOD PRESSURE: 127 MMHG | HEART RATE: 77 BPM | HEIGHT: 66 IN | WEIGHT: 182.98 LBS | DIASTOLIC BLOOD PRESSURE: 79 MMHG | OXYGEN SATURATION: 96 % | RESPIRATION RATE: 18 BRPM | TEMPERATURE: 97 F

## 2024-07-03 DIAGNOSIS — C50.912 MALIGNANT NEOPLASM OF UNSPECIFIED SITE OF LEFT FEMALE BREAST: ICD-10-CM

## 2024-07-03 DIAGNOSIS — Z98.890 OTHER SPECIFIED POSTPROCEDURAL STATES: Chronic | ICD-10-CM

## 2024-07-03 DIAGNOSIS — Z96.649 PRESENCE OF UNSPECIFIED ARTIFICIAL HIP JOINT: Chronic | ICD-10-CM

## 2024-07-03 DIAGNOSIS — Z90.89 ACQUIRED ABSENCE OF OTHER ORGANS: Chronic | ICD-10-CM

## 2024-07-03 LAB
ALBUMIN SERPL ELPH-MCNC: 3.5 G/DL — SIGNIFICANT CHANGE UP (ref 3.3–5)
ALP SERPL-CCNC: 89 U/L — SIGNIFICANT CHANGE UP (ref 40–120)
ALT FLD-CCNC: 14 U/L — SIGNIFICANT CHANGE UP (ref 10–45)
ANION GAP SERPL CALC-SCNC: 2 MMOL/L — LOW (ref 5–17)
AST SERPL-CCNC: 27 U/L — SIGNIFICANT CHANGE UP (ref 10–40)
BILIRUB SERPL-MCNC: 0.6 MG/DL — SIGNIFICANT CHANGE UP (ref 0.2–1.2)
BUN SERPL-MCNC: 15 MG/DL — SIGNIFICANT CHANGE UP (ref 7–23)
CALCIUM SERPL-MCNC: 9.9 MG/DL — SIGNIFICANT CHANGE UP (ref 8.4–10.5)
CHLORIDE SERPL-SCNC: 106 MMOL/L — SIGNIFICANT CHANGE UP (ref 96–108)
CO2 SERPL-SCNC: 30 MMOL/L — SIGNIFICANT CHANGE UP (ref 22–31)
CREAT SERPL-MCNC: 0.9 MG/DL — SIGNIFICANT CHANGE UP (ref 0.5–1.3)
EGFR: 69 ML/MIN/1.73M2 — SIGNIFICANT CHANGE UP
GLUCOSE SERPL-MCNC: 113 MG/DL — HIGH (ref 70–99)
HCT VFR BLD CALC: 39.6 % — SIGNIFICANT CHANGE UP (ref 34.5–45)
HGB BLD-MCNC: 13 G/DL — SIGNIFICANT CHANGE UP (ref 11.5–15.5)
LYMPHOCYTES # BLD AUTO: 1.3 K/UL — SIGNIFICANT CHANGE UP (ref 1–3.3)
LYMPHOCYTES # BLD AUTO: 26.4 % — SIGNIFICANT CHANGE UP (ref 13–44)
MCHC RBC-ENTMCNC: 28.2 PG — SIGNIFICANT CHANGE UP (ref 27–34)
MCHC RBC-ENTMCNC: 32.8 GM/DL — SIGNIFICANT CHANGE UP (ref 32–36)
MCV RBC AUTO: 85.9 FL — SIGNIFICANT CHANGE UP (ref 80–100)
NEUTROPHILS # BLD AUTO: 3.5 K/UL — SIGNIFICANT CHANGE UP (ref 1.8–7.4)
NEUTROPHILS NFR BLD AUTO: 69.8 % — SIGNIFICANT CHANGE UP (ref 43–77)
PLATELET # BLD AUTO: 207 K/UL — SIGNIFICANT CHANGE UP (ref 150–400)
POTASSIUM SERPL-MCNC: 4.4 MMOL/L — SIGNIFICANT CHANGE UP (ref 3.5–5.3)
POTASSIUM SERPL-SCNC: 4.4 MMOL/L — SIGNIFICANT CHANGE UP (ref 3.5–5.3)
PROT SERPL-MCNC: 6.9 G/DL — SIGNIFICANT CHANGE UP (ref 6–8.3)
RBC # BLD: 4.61 M/UL — SIGNIFICANT CHANGE UP (ref 3.8–5.2)
RBC # FLD: 13.2 % — SIGNIFICANT CHANGE UP (ref 10.3–14.5)
SODIUM SERPL-SCNC: 138 MMOL/L — SIGNIFICANT CHANGE UP (ref 135–145)
WBC # BLD: 5 K/UL — SIGNIFICANT CHANGE UP (ref 3.8–10.5)
WBC # FLD AUTO: 5 K/UL — SIGNIFICANT CHANGE UP (ref 3.8–10.5)

## 2024-07-03 PROCEDURE — 85025 COMPLETE CBC W/AUTO DIFF WBC: CPT

## 2024-07-03 PROCEDURE — 80053 COMPREHEN METABOLIC PANEL: CPT

## 2024-07-03 PROCEDURE — 36415 COLL VENOUS BLD VENIPUNCTURE: CPT

## 2024-07-03 PROCEDURE — 96401 CHEMO ANTI-NEOPL SQ/IM: CPT

## 2024-07-03 RX ORDER — PERTUZUMAB, TRASTUZUMAB, AND HYALURONIDASE-ZZXF 600; 600; 2000 MG/10ML; MG/10ML; U/10ML
10 INJECTION, SOLUTION SUBCUTANEOUS ONCE
Refills: 0 | Status: COMPLETED | OUTPATIENT
Start: 2024-07-03 | End: 2024-07-03

## 2024-07-03 RX ADMIN — PERTUZUMAB, TRASTUZUMAB, AND HYALURONIDASE-ZZXF 10 MILLILITER(S): 600; 600; 2000 INJECTION, SOLUTION SUBCUTANEOUS at 13:30

## 2024-07-12 ENCOUNTER — APPOINTMENT (OUTPATIENT)
Dept: PHYSICAL MEDICINE AND REHAB | Facility: CLINIC | Age: 69
End: 2024-07-12
Payer: MEDICARE

## 2024-07-12 VITALS — HEART RATE: 77 BPM | OXYGEN SATURATION: 98 % | WEIGHT: 176 LBS | HEIGHT: 66 IN | BODY MASS INDEX: 28.28 KG/M2

## 2024-07-12 VITALS — DIASTOLIC BLOOD PRESSURE: 81 MMHG | SYSTOLIC BLOOD PRESSURE: 135 MMHG

## 2024-07-12 DIAGNOSIS — I89.0 LYMPHEDEMA, NOT ELSEWHERE CLASSIFIED: ICD-10-CM

## 2024-07-12 PROCEDURE — 99205 OFFICE O/P NEW HI 60 MIN: CPT

## 2024-07-14 ENCOUNTER — NON-APPOINTMENT (OUTPATIENT)
Age: 69
End: 2024-07-14

## 2024-07-15 ENCOUNTER — APPOINTMENT (OUTPATIENT)
Dept: HEART AND VASCULAR | Facility: CLINIC | Age: 69
End: 2024-07-15
Payer: MEDICARE

## 2024-07-15 VITALS
BODY MASS INDEX: 28.12 KG/M2 | WEIGHT: 175 LBS | DIASTOLIC BLOOD PRESSURE: 82 MMHG | HEIGHT: 66 IN | HEART RATE: 87 BPM | OXYGEN SATURATION: 96 % | SYSTOLIC BLOOD PRESSURE: 132 MMHG

## 2024-07-15 DIAGNOSIS — R73.09 OTHER ABNORMAL GLUCOSE: ICD-10-CM

## 2024-07-15 DIAGNOSIS — I25.10 ATHEROSCLEROTIC HEART DISEASE OF NATIVE CORONARY ARTERY W/OUT ANGINA PECTORIS: ICD-10-CM

## 2024-07-15 PROCEDURE — G2211 COMPLEX E/M VISIT ADD ON: CPT

## 2024-07-15 PROCEDURE — 99215 OFFICE O/P EST HI 40 MIN: CPT

## 2024-07-15 PROCEDURE — 93000 ELECTROCARDIOGRAM COMPLETE: CPT

## 2024-07-15 RX ORDER — ROSUVASTATIN CALCIUM 10 MG/1
10 TABLET, FILM COATED ORAL DAILY
Qty: 90 | Refills: 1 | Status: ACTIVE | COMMUNITY
Start: 2024-07-15 | End: 1900-01-01

## 2024-07-18 PROBLEM — I89.0 LYMPHEDEMA OF ARM: Status: ACTIVE | Noted: 2024-07-18

## 2024-07-24 ENCOUNTER — APPOINTMENT (OUTPATIENT)
Dept: HEMATOLOGY ONCOLOGY | Facility: CLINIC | Age: 69
End: 2024-07-24
Payer: COMMERCIAL

## 2024-07-24 ENCOUNTER — APPOINTMENT (OUTPATIENT)
Dept: INFUSION THERAPY | Facility: CLINIC | Age: 69
End: 2024-07-24

## 2024-07-24 VITALS
DIASTOLIC BLOOD PRESSURE: 86 MMHG | RESPIRATION RATE: 18 BRPM | OXYGEN SATURATION: 98 % | HEART RATE: 81 BPM | SYSTOLIC BLOOD PRESSURE: 132 MMHG | TEMPERATURE: 98.1 F | HEIGHT: 66 IN | BODY MASS INDEX: 28.61 KG/M2 | WEIGHT: 178 LBS

## 2024-07-24 DIAGNOSIS — C50.911 MALIGNANT NEOPLASM OF UNSPECIFIED SITE OF RIGHT FEMALE BREAST: ICD-10-CM

## 2024-07-24 DIAGNOSIS — R68.89 OTHER GENERAL SYMPTOMS AND SIGNS: ICD-10-CM

## 2024-07-24 DIAGNOSIS — R92.8 OTHER ABNORMAL AND INCONCLUSIVE FINDINGS ON DIAGNOSTIC IMAGING OF BREAST: ICD-10-CM

## 2024-07-24 DIAGNOSIS — R79.89 OTHER SPECIFIED ABNORMAL FINDINGS OF BLOOD CHEMISTRY: ICD-10-CM

## 2024-07-24 DIAGNOSIS — Z71.89 OTHER SPECIFIED COUNSELING: ICD-10-CM

## 2024-07-24 DIAGNOSIS — Z13.220 ENCOUNTER FOR SCREENING FOR LIPOID DISORDERS: ICD-10-CM

## 2024-07-24 DIAGNOSIS — G62.0 DRUG-INDUCED POLYNEUROPATHY: ICD-10-CM

## 2024-07-24 DIAGNOSIS — G62.89 OTHER SPECIFIED POLYNEUROPATHIES: ICD-10-CM

## 2024-07-24 DIAGNOSIS — Z86.39 PERSONAL HISTORY OF OTHER ENDOCRINE, NUTRITIONAL AND METABOLIC DISEASE: ICD-10-CM

## 2024-07-24 DIAGNOSIS — Z90.13 ACQUIRED ABSENCE OF BILATERAL BREASTS AND NIPPLES: ICD-10-CM

## 2024-07-24 DIAGNOSIS — I89.0 LYMPHEDEMA, NOT ELSEWHERE CLASSIFIED: ICD-10-CM

## 2024-07-24 DIAGNOSIS — Z87.898 PERSONAL HISTORY OF OTHER SPECIFIED CONDITIONS: ICD-10-CM

## 2024-07-24 DIAGNOSIS — Z86.59 PERSONAL HISTORY OF OTHER MENTAL AND BEHAVIORAL DISORDERS: ICD-10-CM

## 2024-07-24 DIAGNOSIS — Z42.1 ENCOUNTER FOR BREAST RECONSTRUCTION FOLLOWING MASTECTOMY: ICD-10-CM

## 2024-07-24 DIAGNOSIS — Z13.820 ENCOUNTER FOR SCREENING FOR OSTEOPOROSIS: ICD-10-CM

## 2024-07-24 DIAGNOSIS — Z51.5 ENCOUNTER FOR PALLIATIVE CARE: ICD-10-CM

## 2024-07-24 DIAGNOSIS — G62.9 POLYNEUROPATHY, UNSPECIFIED: ICD-10-CM

## 2024-07-24 LAB
ALBUMIN SERPL ELPH-MCNC: 3.8 G/DL
ALP BLD-CCNC: 101 U/L
ALT SERPL-CCNC: 16 U/L
ANION GAP SERPL CALC-SCNC: 3 MMOL/L
AST SERPL-CCNC: 24 U/L
BILIRUB SERPL-MCNC: 0.6 MG/DL
BUN SERPL-MCNC: 14 MG/DL
CALCIUM SERPL-MCNC: 10 MG/DL
CHLORIDE SERPL-SCNC: 109 MMOL/L
CO2 SERPL-SCNC: 31 MMOL/L
CREAT SERPL-MCNC: 0.9 MG/DL
EGFR: 69 ML/MIN/1.73M2
GLUCOSE SERPL-MCNC: 62 MG/DL
HCT VFR BLD CALC: 41.5 %
HGB BLD-MCNC: 13.8 G/DL
IRON SATN MFR SERPL: 18 %
IRON SERPL-MCNC: 70 UG/DL
LYMPHOCYTES # BLD AUTO: 1.2 K/UL
LYMPHOCYTES NFR BLD AUTO: 29 %
MAGNESIUM SERPL-MCNC: 2 MG/DL
MAN DIFF?: NO
MCHC RBC-ENTMCNC: 28.4 PG
MCHC RBC-ENTMCNC: 33.3 GM/DL
MCV RBC AUTO: 85.4 FL
NEUTROPHILS # BLD AUTO: 2.9 K/UL
NEUTROPHILS NFR BLD AUTO: 67 %
PLATELET # BLD AUTO: 215 K/UL
POTASSIUM SERPL-SCNC: 4.4 MMOL/L
PROT SERPL-MCNC: 7.7 G/DL
RBC # BLD: 4.86 M/UL
RBC # FLD: 12.7 %
SODIUM SERPL-SCNC: 143 MMOL/L
TIBC SERPL-MCNC: 387 UG/DL
UIBC SERPL-MCNC: 317 UG/DL
WBC # FLD AUTO: 4.3 K/UL

## 2024-07-24 PROCEDURE — 99214 OFFICE O/P EST MOD 30 MIN: CPT

## 2024-07-24 PROCEDURE — G2211 COMPLEX E/M VISIT ADD ON: CPT

## 2024-07-24 PROCEDURE — 36415 COLL VENOUS BLD VENIPUNCTURE: CPT

## 2024-07-24 RX ORDER — GABAPENTIN 100 MG/1
100 CAPSULE ORAL
Qty: 90 | Refills: 1 | Status: ACTIVE | COMMUNITY
Start: 2024-07-24 | End: 1900-01-01

## 2024-07-24 NOTE — ASSESSMENT
[FreeTextEntry1] : 68F with multicentric right breast invasive ductal carcinoma ER/MN negative, HER-2 positive. cT3 (area of concern measuring 8.7cm on MRI, possibly multicentric) cN0 Stage IIB. NAC -Taxol/Carbo wkly x12 carbo dc'd due to myelosuppression received 11/12 taxol held for neuropathy and Phesgo (started 6/21/23) every 3 weeks, s/p B/L mastectomy on 12/5/23 for R IDC and L DCIS with PCR. To complete 1 year Phesgo (last Phesgo to be sched 9/25/24). Last ECHO 6/12/24 EF 59%. Recommend 100mg Gabapentin for neuropathy, 1-3 tabs qhs. - discussed in detail, reviewe exercise - consider acupuncture Labs today: CBC w Diff, CMP, iron studies, Mg. F/U 6 weeks. Phesgo today and in 3 wks. PT for lymphedema Lt UE. Reviewed hand and arm care

## 2024-07-24 NOTE — PHYSICAL EXAM
[Restricted in physically strenuous activity but ambulatory and able to carry out work of a light or sedentary nature] : Status 1- Restricted in physically strenuous activity but ambulatory and able to carry out work of a light or sedentary nature, e.g., light house work, office work [Normal] : affect appropriate [de-identified] : s/p bilat mastectomy with implants - no palp mass or LN [de-identified] : mild edema Lt forearam

## 2024-07-24 NOTE — PHYSICAL EXAM
[Restricted in physically strenuous activity but ambulatory and able to carry out work of a light or sedentary nature] : Status 1- Restricted in physically strenuous activity but ambulatory and able to carry out work of a light or sedentary nature, e.g., light house work, office work [Normal] : affect appropriate [de-identified] : s/p bilat mastectomy with implants - no palp mass or LN [de-identified] : mild edema Lt forearam

## 2024-07-24 NOTE — HISTORY OF PRESENT ILLNESS
[de-identified] : 4/13/23 ([facility]) B/l screening mammo: scattered fibroglandular density. There is a highly suspicious spiculated mass with calcifications in between 6 and 7:00 in the right breast 5 cm from the nipple (annotated) for which spot magnification CC/ML and full-field lateral views with tomosynthesis and ultrasound are recommended. There is a smaller mass in the more posterior 7:00 right breast 11 cm from the nipple (annotated) for which spot compression CC/MLO and ultrasound are recommended. There is a developing mass/asymmetry in the superior aspect of the right breast on the MLO view 6 cm from the nipple (annotated) for which a spot compression MLO view and ultrasound are recommended.Pleomorphic calcifications have developed in the middle to posterior right breast from 5:00 to 7:00 highly suspicious for DCIS (annotated) for which additional magnification CC/ML views are recommended.The left breast demonstrates no suspicious masses, calcifications, skin thickening or areas of distortion. BI-RADS 0.  4/26/23 ([facility]) right diag mammo and US: scattered fibroglandular density. 1. Right lower outer quadrant suspicious masses, ultrasound-guided biopsy, 2 sites to demonstrate at least multifocal disease. 2. Right lower inner quadrant fine pleomorphic calcifications, stereotactic guided, 1 site recommended, to evaluate for multicentric disease. BI-RADS 5.  5/16/23 ([facility]) 1. Breast, right, 6:30 4 cm FN, 2.2 cm spiculated mass; ultrasound-guided biopsy: - Invasive ductal carcinoma, poorly-differentiated associated with necrosis, measuring at least 12 mm in this material (see note) 2. Breast, right, 8:00 10 cm FN, 0.9 cm solid mass; ultrasound-guided biopsy: - Invasive ductal carcinoma, poorly-differentiated associated with mild lymphoid infiltrate, measuring at least 7 mm in this material, morphologically similar to part 1 3. 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 The findings are compatible with multicentric breast cancer involving at least 2 quadrants over 9 cm region. There are additional highly suspicious microcalcifications identified between the calcifications sampled and the 8 o'clock axis posterior mass. Appropriate action should be taken for the multi centric breast cancer.  6/2/23 ([facility]) b/l breast MRI: multicentric right breast malignancy, appearance of the MRI closely approximates appearance on post-biopsy imaging either the sites of concern spanned 8.7 x.3 x 8.1cm, dominant tumor 5-6:00 axis abuts but does not demonstrate skin involvement. No adenopathy. 2 indeterminate findings in the left breast 2:00 and 6:00 recommend 2 site left breast MRI biopsy. BIRADS 4  6/14/23 ([facility]) MR biopsy x2: 1. The left 6:00 axis 6 mm enhancing mass yielded DCIS, solid type with intermediate nuclear grade. Pathology results indicate that the specimen is malignant and concordant with imaging. This finding is delineated by an hourglass shaped clip which is medially displaced. ADDENDUM: Estrogen receptor 100% 3+ POSITIVE; Progesterone receptor INSUFFICIENT TISSUE 2. The pathology of the left 9:30 axis 6 mm clumped nonmass enhancement yielded intraductal papilloma with atypia. Pathology results indicate that this specimen is high risk and concordant with imaging. If breast conservation is performed for the left breast DCIS then surgical excisional biopsy would be recommended for this finding. **In addition, given the patient's diagnosis of left breast DCIS, MR guided core biopsy of the linear nonmass enhancement within the left medial posterior breast can be attempted, if breast conservation is desired. If this is not amenable to MR guided core biopsy due to its medial posterior location, then clip placement can be performed for possible needle localization and excision at the time of the left breast lumpectomy for DCIS and excisional biopsy for atypical papilloma.  [facility]/27/23 ([facility]) 1 Left breast medial posterior, MRI guided core biopsies: Fibroadenoma. Fibrocystic changes including reactive changes most likely due to cyst rupture. IMPRESSION: These results are benign and concordant. RECOMMENDATION: Surgical or oncologic management.     NAC - Taxol/Carbo wkly x12 carbo dc'd due to myelosuppression received 11/12 taxol held for neuropathy and Phesgo every 3 weeks  10/5/2023 ([facility]) bilateral breast MRI: Complete imaging response to neoadjuvant chemotherapy. Unremarkable left breast. BI-RADS 6.  12/5/23 ([facility]) 1 Right breast mastectomy:- No residual carcinoma identified.- Benign breast tissue with three separate areas of densely fibrotic breast tissue with biopsy site changes, consistent with tumor bed, associated with calcifications.- Unremarkable skin and nipple. 2 Right axillary tail, excision:- Benign fibroadipose tissue. 3 Right axillary sentinel lymph nodes, biopsy:- Two lymph nodes, negative for tumor (0/2). 4 Left axillary lymph node, biopsy:- One lymph node, negative for tumor (0/1). 5 Right breast skin short superior long lateral, excision:- Unremarkable skin and subcutis. 6 Left Breast Mastectomy:- No residual carcinoma identified.- Benign breast tissue with two separate biopsy sites.- Focal atypical duct hyperplasia involving intraductal papilloma.- Unremarkable skin and nipple. 7 Left axillary tail, excision: - Benign adipose tissue. pT0pN0.   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 Last ECHO 6/12/24 EF 59% Recently evaluated by Dr. Pittman given prior episodes of syncope, cardiac etiology ruled out. Continues to have G1 neuropathy in bilateral hands and feet, however hand neuropathy has improved to the point that buttoning and other fine motor movements have recovered. Takes medical marijuana which may have contributed to syncope.  Signed consent today. to continue phesgo

## 2024-07-24 NOTE — ASSESSMENT
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 cT3 (area of concern measuring 8.7cm on MRI, possibly multicentric) cN0 Stage IIB. NAC -Taxol/Carbo wkly x12 carbo dc'd due to myelosuppression received 11/12 taxol held for neuropathy and Phesgo (started 6/21/23) every 3 weeks, s/p B/L mastectomy on 12/5/23 for R IDC and L DCIS with PCR. To complete 1 year Phesgo (last Phesgo to be sched 9/25/24). Last ECHO 6/12/24 EF 59%. Recommend 100mg Gabapentin for neuropathy, 1-3 tabs qhs. - discussed in detail, reviewe exercise - consider acupuncture Labs today: CBC w Diff, CMP, iron studies, Mg. F/U 6 weeks. Phesgo today and in 3 wks. PT for lymphedema Lt UE. Reviewed hand and arm care

## 2024-07-24 NOTE — HISTORY OF PRESENT ILLNESS
[de-identified] : 4/13/23 ([facility]) B/l screening mammo: scattered fibroglandular density. There is a highly suspicious spiculated mass with calcifications in between 6 and 7:00 in the right breast 5 cm from the nipple (annotated) for which spot magnification CC/ML and full-field lateral views with tomosynthesis and ultrasound are recommended. There is a smaller mass in the more posterior 7:00 right breast 11 cm from the nipple (annotated) for which spot compression CC/MLO and ultrasound are recommended. There is a developing mass/asymmetry in the superior aspect of the right breast on the MLO view 6 cm from the nipple (annotated) for which a spot compression MLO view and ultrasound are recommended.Pleomorphic calcifications have developed in the middle to posterior right breast from 5:00 to 7:00 highly suspicious for DCIS (annotated) for which additional magnification CC/ML views are recommended.The left breast demonstrates no suspicious masses, calcifications, skin thickening or areas of distortion. BI-RADS 0.  4/26/23 ([facility]) right diag mammo and US: scattered fibroglandular density. 1. Right lower outer quadrant suspicious masses, ultrasound-guided biopsy, 2 sites to demonstrate at least multifocal disease. 2. Right lower inner quadrant fine pleomorphic calcifications, stereotactic guided, 1 site recommended, to evaluate for multicentric disease. BI-RADS 5.  5/16/23 ([facility]) 1. Breast, right, 6:30 4 cm FN, 2.2 cm spiculated mass; ultrasound-guided biopsy: - Invasive ductal carcinoma, poorly-differentiated associated with necrosis, measuring at least 12 mm in this material (see note) 2. Breast, right, 8:00 10 cm FN, 0.9 cm solid mass; ultrasound-guided biopsy: - Invasive ductal carcinoma, poorly-differentiated associated with mild lymphoid infiltrate, measuring at least 7 mm in this material, morphologically similar to part 1 3. 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 The findings are compatible with multicentric breast cancer involving at least 2 quadrants over 9 cm region. There are additional highly suspicious microcalcifications identified between the calcifications sampled and the 8 o'clock axis posterior mass. Appropriate action should be taken for the multi centric breast cancer.  6/2/23 ([facility]) b/l breast MRI: multicentric right breast malignancy, appearance of the MRI closely approximates appearance on post-biopsy imaging either the sites of concern spanned 8.7 x.3 x 8.1cm, dominant tumor 5-6:00 axis abuts but does not demonstrate skin involvement. No adenopathy. 2 indeterminate findings in the left breast 2:00 and 6:00 recommend 2 site left breast MRI biopsy. BIRADS 4  6/14/23 ([facility]) MR biopsy x2: 1. The left 6:00 axis 6 mm enhancing mass yielded DCIS, solid type with intermediate nuclear grade. Pathology results indicate that the specimen is malignant and concordant with imaging. This finding is delineated by an hourglass shaped clip which is medially displaced. ADDENDUM: Estrogen receptor 100% 3+ POSITIVE; Progesterone receptor INSUFFICIENT TISSUE 2. The pathology of the left 9:30 axis 6 mm clumped nonmass enhancement yielded intraductal papilloma with atypia. Pathology results indicate that this specimen is high risk and concordant with imaging. If breast conservation is performed for the left breast DCIS then surgical excisional biopsy would be recommended for this finding. **In addition, given the patient's diagnosis of left breast DCIS, MR guided core biopsy of the linear nonmass enhancement within the left medial posterior breast can be attempted, if breast conservation is desired. If this is not amenable to MR guided core biopsy due to its medial posterior location, then clip placement can be performed for possible needle localization and excision at the time of the left breast lumpectomy for DCIS and excisional biopsy for atypical papilloma.  6/27/23 ([facility]) 1 Left breast medial posterior, MRI guided core biopsies: Fibroadenoma. Fibrocystic changes including reactive changes most likely due to cyst rupture. IMPRESSION: These results are benign and concordant. RECOMMENDATION: Surgical or oncologic management.     NAC - Taxol/Carbo wkly x12 carbo dc'd due to myelosuppression received 11/12 taxol held for neuropathy and Phesgo every 3 weeks  10/5/2023 ([facility]) bilateral breast MRI: Complete imaging response to neoadjuvant chemotherapy. Unremarkable left breast. BI-RADS 6.  12/5/23 ([facility]) 1 Right breast mastectomy:- No residual carcinoma identified.- Benign breast tissue with three separate areas of densely fibrotic breast tissue with biopsy site changes, consistent with tumor bed, associated with calcifications.- Unremarkable skin and nipple. 2 Right axillary tail, excision:- Benign fibroadipose tissue. 3 Right axillary sentinel lymph nodes, biopsy:- Two lymph nodes, negative for tumor (0/2). 4 Left axillary lymph node, biopsy:- One lymph node, negative for tumor (0/1). 5 Right breast skin short superior long lateral, excision:- Unremarkable skin and subcutis. 6 Left Breast Mastectomy:- No residual carcinoma identified.- Benign breast tissue with two separate biopsy sites.- Focal atypical duct hyperplasia involving intraductal papilloma.- Unremarkable skin and nipple. 7 Left axillary tail, excision: - Benign adipose tissue. pT0pN0.   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 Last ECHO 6/12/24 EF 59% Recently evaluated by Dr. Pittman given prior episodes of syncope, cardiac etiology ruled out. Continues to have G1 neuropathy in bilateral hands and feet, however hand neuropathy has improved to the point that buttoning and other fine motor movements have recovered. Takes medical marijuana which may have contributed to syncope.  Signed consent today. to continue phesgo

## 2024-07-25 LAB — 25(OH)D3 SERPL-MCNC: 27.5 NG/ML

## 2024-08-14 ENCOUNTER — APPOINTMENT (OUTPATIENT)
Dept: INFUSION THERAPY | Facility: CLINIC | Age: 69
End: 2024-08-14

## 2024-08-29 NOTE — HISTORY OF PRESENT ILLNESS
[de-identified] : 4/13/23 ([facility]) B/l screening mammo: scattered fibroglandular density. There is a highly suspicious spiculated mass with calcifications in between 6 and 7:00 in the right breast 5 cm from the nipple (annotated) for which spot magnification CC/ML and full-field lateral views with tomosynthesis and ultrasound are recommended. There is a smaller mass in the more posterior 7:00 right breast 11 cm from the nipple (annotated) for which spot compression CC/MLO and ultrasound are recommended. There is a developing mass/asymmetry in the superior aspect of the right breast on the MLO view 6 cm from the nipple (annotated) for which a spot compression MLO view and ultrasound are recommended.Pleomorphic calcifications have developed in the middle to posterior right breast from 5:00 to 7:00 highly suspicious for DCIS (annotated) for which additional magnification CC/ML views are recommended.The left breast demonstrates no suspicious masses, calcifications, skin thickening or areas of distortion. BI-RADS 0.  4/26/23 ([facility]) right diag mammo and US: scattered fibroglandular density. 1. Right lower outer quadrant suspicious masses, ultrasound-guided biopsy, 2 sites to demonstrate at least multifocal disease. 2. Right lower inner quadrant fine pleomorphic calcifications, stereotactic guided, 1 site recommended, to evaluate for multicentric disease. BI-RADS 5.  5/16/23 ([facility]) 1. Breast, right, 6:30 4 cm FN, 2.2 cm spiculated mass; ultrasound-guided biopsy: - Invasive ductal carcinoma, poorly-differentiated associated with necrosis, measuring at least 12 mm in this material (see note) 2. Breast, right, 8:00 10 cm FN, 0.9 cm solid mass; ultrasound-guided biopsy: - Invasive ductal carcinoma, poorly-differentiated associated with mild lymphoid infiltrate, measuring at least 7 mm in this material, morphologically similar to part 1 3. 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 The findings are compatible with multicentric breast cancer involving at least 2 quadrants over 9 cm region. There are additional highly suspicious microcalcifications identified between the calcifications sampled and the 8 o'clock axis posterior mass. Appropriate action should be taken for the multi centric breast cancer.  6/2/23 ([facility]) b/l breast MRI: multicentric right breast malignancy, appearance of the MRI closely approximates appearance on post-biopsy imaging either the sites of concern spanned 8.7 x.3 x 8.1cm, dominant tumor 5-6:00 axis abuts but does not demonstrate skin involvement. No adenopathy. 2 indeterminate findings in the left breast 2:00 and 6:00 recommend 2 site left breast MRI biopsy. BIRADS 4  6/14/23 ([facility]) MR biopsy x2: 1. The left 6:00 axis 6 mm enhancing mass yielded DCIS, solid type with intermediate nuclear grade. Pathology results indicate that the specimen is malignant and concordant with imaging. This finding is delineated by an hourglass shaped clip which is medially displaced. ADDENDUM: Estrogen receptor 100% 3+ POSITIVE; Progesterone receptor INSUFFICIENT TISSUE 2. The pathology of the left 9:30 axis 6 mm clumped nonmass enhancement yielded intraductal papilloma with atypia. Pathology results indicate that this specimen is high risk and concordant with imaging. If breast conservation is performed for the left breast DCIS then surgical excisional biopsy would be recommended for this finding. **In addition, given the patient's diagnosis of left breast DCIS, MR guided core biopsy of the linear nonmass enhancement within the left medial posterior breast can be attempted, if breast conservation is desired. If this is not amenable to MR guided core biopsy due to its medial posterior location, then clip placement can be performed for possible needle localization and excision at the time of the left breast lumpectomy for DCIS and excisional biopsy for atypical papilloma.  [facility] 1 Left breast medial posterior, MRI guided core biopsies: Fibroadenoma. Fibrocystic changes including reactive changes most likely due to cyst rupture. IMPRESSION: These results are benign and concordant. RECOMMENDATION: Surgical or oncologic management.     NAC - Taxol/Carbo wkly x12 carbo dc'd due to myelosuppression received 11/12 taxol held for neuropathy and Phesgo every 3 weeks  10/5/2023 ([facility]) bilateral breast MRI: Complete imaging response to neoadjuvant chemotherapy. Unremarkable left breast. BI-RADS 6.  12/5/23 ([facility]) 1 Right breast mastectomy:- No residual carcinoma identified.- Benign breast tissue with three separate areas of densely fibrotic breast tissue with biopsy site changes, consistent with tumor bed, associated with calcifications.- Unremarkable skin and nipple. 2 Right axillary tail, excision:- Benign fibroadipose tissue. 3 Right axillary sentinel lymph nodes, biopsy:- Two lymph nodes, negative for tumor (0/2). 4 Left axillary lymph node, biopsy:- One lymph node, negative for tumor (0/1). 5 Right breast skin short superior long lateral, excision:- Unremarkable skin and subcutis. 6 Left Breast Mastectomy:- No residual carcinoma identified.- Benign breast tissue with two separate biopsy sites.- Focal atypical duct hyperplasia involving intraductal papilloma.- Unremarkable skin and nipple. 7 Left axillary tail, excision: - Benign adipose tissue. pT0pN0.   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 Last ECHO 6/12/24 EF 59% Recently evaluated by Dr. Pittman given prior episodes of syncope, cardiac etiology ruled out. Continues to have G1 neuropathy in bilateral hands and feet, however hand neuropathy has improved to the point that buttoning and other fine motor movements have recovered. Takes medical marijuana which may have contributed to syncope.  Signed consent today. to continue phesgo

## 2024-08-29 NOTE — HISTORY OF PRESENT ILLNESS
[de-identified] : 4/13/23 ([facility]) B/l screening mammo: scattered fibroglandular density. There is a highly suspicious spiculated mass with calcifications in between 6 and 7:00 in the right breast 5 cm from the nipple (annotated) for which spot magnification CC/ML and full-field lateral views with tomosynthesis and ultrasound are recommended. There is a smaller mass in the more posterior 7:00 right breast 11 cm from the nipple (annotated) for which spot compression CC/MLO and ultrasound are recommended. There is a developing mass/asymmetry in the superior aspect of the right breast on the MLO view 6 cm from the nipple (annotated) for which a spot compression MLO view and ultrasound are recommended.Pleomorphic calcifications have developed in the middle to posterior right breast from 5:00 to 7:00 highly suspicious for DCIS (annotated) for which additional magnification CC/ML views are recommended.The left breast demonstrates no suspicious masses, calcifications, skin thickening or areas of distortion. BI-RADS 0.  4/26/23 ([facility]) right diag mammo and US: scattered fibroglandular density. 1. Right lower outer quadrant suspicious masses, ultrasound-guided biopsy, 2 sites to demonstrate at least multifocal disease. 2. Right lower inner quadrant fine pleomorphic calcifications, stereotactic guided, 1 site recommended, to evaluate for multicentric disease. BI-RADS 5.  5/16/23 ([facility]) 1. Breast, right, 6:30 4 cm FN, 2.2 cm spiculated mass; ultrasound-guided biopsy: - Invasive ductal carcinoma, poorly-differentiated associated with necrosis, measuring at least 12 mm in this material (see note) 2. Breast, right, 8:00 10 cm FN, 0.9 cm solid mass; ultrasound-guided biopsy: - Invasive ductal carcinoma, poorly-differentiated associated with mild lymphoid infiltrate, measuring at least 7 mm in this material, morphologically similar to part 1 3. 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 The findings are compatible with multicentric breast cancer involving at least 2 quadrants over 9 cm region. There are additional highly suspicious microcalcifications identified between the calcifications sampled and the 8 o'clock axis posterior mass. Appropriate action should be taken for the multi centric breast cancer.  6/2/23 ([facility]) b/l breast MRI: multicentric right breast malignancy, appearance of the MRI closely approximates appearance on post-biopsy imaging either the sites of concern spanned 8.7 x.3 x 8.1cm, dominant tumor 5-6:00 axis abuts but does not demonstrate skin involvement. No adenopathy. 2 indeterminate findings in the left breast 2:00 and 6:00 recommend 2 site left breast MRI biopsy. BIRADS 4  6/14/23 ([facility]) MR biopsy x2: 1. The left 6:00 axis 6 mm enhancing mass yielded DCIS, solid type with intermediate nuclear grade. Pathology results indicate that the specimen is malignant and concordant with imaging. This finding is delineated by an hourglass shaped clip which is medially displaced. ADDENDUM: Estrogen receptor 100% 3+ POSITIVE; Progesterone receptor INSUFFICIENT TISSUE 2. The pathology of the left 9:30 axis 6 mm clumped nonmass enhancement yielded intraductal papilloma with atypia. Pathology results indicate that this specimen is high risk and concordant with imaging. If breast conservation is performed for the left breast DCIS then surgical excisional biopsy would be recommended for this finding. **In addition, given the patient's diagnosis of left breast DCIS, MR guided core biopsy of the linear nonmass enhancement within the left medial posterior breast can be attempted, if breast conservation is desired. If this is not amenable to MR guided core biopsy due to its medial posterior location, then clip placement can be performed for possible needle localization and excision at the time of the left breast lumpectomy for DCIS and excisional biopsy for atypical papilloma.  [facility] 1 Left breast medial posterior, MRI guided core biopsies: Fibroadenoma. Fibrocystic changes including reactive changes most likely due to cyst rupture. IMPRESSION: These results are benign and concordant. RECOMMENDATION: Surgical or oncologic management.     NAC - Taxol/Carbo wkly x12 carbo dc'd due to myelosuppression received 11/12 taxol held for neuropathy and Phesgo every 3 weeks  10/5/2023 ([facility]) bilateral breast MRI: Complete imaging response to neoadjuvant chemotherapy. Unremarkable left breast. BI-RADS 6.  12/5/23 ([facility]) 1 Right breast mastectomy:- No residual carcinoma identified.- Benign breast tissue with three separate areas of densely fibrotic breast tissue with biopsy site changes, consistent with tumor bed, associated with calcifications.- Unremarkable skin and nipple. 2 Right axillary tail, excision:- Benign fibroadipose tissue. 3 Right axillary sentinel lymph nodes, biopsy:- Two lymph nodes, negative for tumor (0/2). 4 Left axillary lymph node, biopsy:- One lymph node, negative for tumor (0/1). 5 Right breast skin short superior long lateral, excision:- Unremarkable skin and subcutis. 6 Left Breast Mastectomy:- No residual carcinoma identified.- Benign breast tissue with two separate biopsy sites.- Focal atypical duct hyperplasia involving intraductal papilloma.- Unremarkable skin and nipple. 7 Left axillary tail, excision: - Benign adipose tissue. pT0pN0.   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 Last ECHO 6/12/24 EF 59% Recently evaluated by Dr. Pittman given prior episodes of syncope, cardiac etiology ruled out. Continues to have G1 neuropathy in bilateral hands and feet, however hand neuropathy has improved to the point that buttoning and other fine motor movements have recovered. Takes medical marijuana which may have contributed to syncope.  Signed consent today. to continue phesgo

## 2024-08-29 NOTE — PHYSICAL EXAM
[Restricted in physically strenuous activity but ambulatory and able to carry out work of a light or sedentary nature] : Status 1- Restricted in physically strenuous activity but ambulatory and able to carry out work of a light or sedentary nature, e.g., light house work, office work [Normal] : affect appropriate [de-identified] : s/p bilat mastectomy with implants - no palp mass or LN [de-identified] : mild edema Lt forearam

## 2024-08-29 NOTE — PHYSICAL EXAM
[Restricted in physically strenuous activity but ambulatory and able to carry out work of a light or sedentary nature] : Status 1- Restricted in physically strenuous activity but ambulatory and able to carry out work of a light or sedentary nature, e.g., light house work, office work [Normal] : affect appropriate [de-identified] : s/p bilat mastectomy with implants - no palp mass or LN [de-identified] : mild edema Lt forearam

## 2024-08-29 NOTE — PHYSICAL EXAM
[Restricted in physically strenuous activity but ambulatory and able to carry out work of a light or sedentary nature] : Status 1- Restricted in physically strenuous activity but ambulatory and able to carry out work of a light or sedentary nature, e.g., light house work, office work [Normal] : affect appropriate [de-identified] : s/p bilat mastectomy with implants - no palp mass or LN [de-identified] : mild edema Lt forearam

## 2024-08-29 NOTE — HISTORY OF PRESENT ILLNESS
[de-identified] : 4/13/23 ([facility]) B/l screening mammo: scattered fibroglandular density. There is a highly suspicious spiculated mass with calcifications in between 6 and 7:00 in the right breast 5 cm from the nipple (annotated) for which spot magnification CC/ML and full-field lateral views with tomosynthesis and ultrasound are recommended. There is a smaller mass in the more posterior 7:00 right breast 11 cm from the nipple (annotated) for which spot compression CC/MLO and ultrasound are recommended. There is a developing mass/asymmetry in the superior aspect of the right breast on the MLO view 6 cm from the nipple (annotated) for which a spot compression MLO view and ultrasound are recommended.Pleomorphic calcifications have developed in the middle to posterior right breast from 5:00 to 7:00 highly suspicious for DCIS (annotated) for which additional magnification CC/ML views are recommended.The left breast demonstrates no suspicious masses, calcifications, skin thickening or areas of distortion. BI-RADS 0.  4/26/23 ([facility]) right diag mammo and US: scattered fibroglandular density. 1. Right lower outer quadrant suspicious masses, ultrasound-guided biopsy, 2 sites to demonstrate at least multifocal disease. 2. Right lower inner quadrant fine pleomorphic calcifications, stereotactic guided, 1 site recommended, to evaluate for multicentric disease. BI-RADS 5.  5/16/23 ([facility]) 1. Breast, right, 6:30 4 cm FN, 2.2 cm spiculated mass; ultrasound-guided biopsy: - Invasive ductal carcinoma, poorly-differentiated associated with necrosis, measuring at least 12 mm in this material (see note) 2. Breast, right, 8:00 10 cm FN, 0.9 cm solid mass; ultrasound-guided biopsy: - Invasive ductal carcinoma, poorly-differentiated associated with mild lymphoid infiltrate, measuring at least 7 mm in this material, morphologically similar to part 1 3. 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 The findings are compatible with multicentric breast cancer involving at least 2 quadrants over 9 cm region. There are additional highly suspicious microcalcifications identified between the calcifications sampled and the 8 o'clock axis posterior mass. Appropriate action should be taken for the multi centric breast cancer.  6/2/23 ([facility]) b/l breast MRI: multicentric right breast malignancy, appearance of the MRI closely approximates appearance on post-biopsy imaging either the sites of concern spanned 8.7 x.3 x 8.1cm, dominant tumor 5-6:00 axis abuts but does not demonstrate skin involvement. No adenopathy. 2 indeterminate findings in the left breast 2:00 and 6:00 recommend 2 site left breast MRI biopsy. BIRADS 4  6/14/23 ([facility]) MR biopsy x2: 1. The left 6:00 axis 6 mm enhancing mass yielded DCIS, solid type with intermediate nuclear grade. Pathology results indicate that the specimen is malignant and concordant with imaging. This finding is delineated by an hourglass shaped clip which is medially displaced. ADDENDUM: Estrogen receptor 100% 3+ POSITIVE; Progesterone receptor INSUFFICIENT TISSUE 2. The pathology of the left 9:30 axis 6 mm clumped nonmass enhancement yielded intraductal papilloma with atypia. Pathology results indicate that this specimen is high risk and concordant with imaging. If breast conservation is performed for the left breast DCIS then surgical excisional biopsy would be recommended for this finding. **In addition, given the patient's diagnosis of left breast DCIS, MR guided core biopsy of the linear nonmass enhancement within the left medial posterior breast can be attempted, if breast conservation is desired. If this is not amenable to MR guided core biopsy due to its medial posterior location, then clip placement can be performed for possible needle localization and excision at the time of the left breast lumpectomy for DCIS and excisional biopsy for atypical papilloma.  [facility] 1 Left breast medial posterior, MRI guided core biopsies: Fibroadenoma. Fibrocystic changes including reactive changes most likely due to cyst rupture. IMPRESSION: These results are benign and concordant. RECOMMENDATION: Surgical or oncologic management.     NAC - Taxol/Carbo wkly x12 carbo dc'd due to myelosuppression received 11/12 taxol held for neuropathy and Phesgo every 3 weeks  10/5/2023 ([facility]) bilateral breast MRI: Complete imaging response to neoadjuvant chemotherapy. Unremarkable left breast. BI-RADS 6.  12/5/23 ([facility]) 1 Right breast mastectomy:- No residual carcinoma identified.- Benign breast tissue with three separate areas of densely fibrotic breast tissue with biopsy site changes, consistent with tumor bed, associated with calcifications.- Unremarkable skin and nipple. 2 Right axillary tail, excision:- Benign fibroadipose tissue. 3 Right axillary sentinel lymph nodes, biopsy:- Two lymph nodes, negative for tumor (0/2). 4 Left axillary lymph node, biopsy:- One lymph node, negative for tumor (0/1). 5 Right breast skin short superior long lateral, excision:- Unremarkable skin and subcutis. 6 Left Breast Mastectomy:- No residual carcinoma identified.- Benign breast tissue with two separate biopsy sites.- Focal atypical duct hyperplasia involving intraductal papilloma.- Unremarkable skin and nipple. 7 Left axillary tail, excision: - Benign adipose tissue. pT0pN0.   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 Last ECHO 6/12/24 EF 59% Recently evaluated by Dr. Pittman given prior episodes of syncope, cardiac etiology ruled out. Continues to have G1 neuropathy in bilateral hands and feet, however hand neuropathy has improved to the point that buttoning and other fine motor movements have recovered. Takes medical marijuana which may have contributed to syncope.  Signed consent today. to continue phesgo

## 2024-09-04 ENCOUNTER — APPOINTMENT (OUTPATIENT)
Dept: HEMATOLOGY ONCOLOGY | Facility: CLINIC | Age: 69
End: 2024-09-04
Payer: COMMERCIAL

## 2024-09-04 ENCOUNTER — APPOINTMENT (OUTPATIENT)
Dept: INFUSION THERAPY | Facility: CLINIC | Age: 69
End: 2024-09-04

## 2024-09-04 ENCOUNTER — OUTPATIENT (OUTPATIENT)
Dept: OUTPATIENT SERVICES | Facility: HOSPITAL | Age: 69
LOS: 1 days | End: 2024-09-04
Payer: MEDICARE

## 2024-09-04 VITALS
OXYGEN SATURATION: 97 % | TEMPERATURE: 97 F | DIASTOLIC BLOOD PRESSURE: 78 MMHG | RESPIRATION RATE: 18 BRPM | WEIGHT: 182.1 LBS | HEIGHT: 66 IN | HEART RATE: 73 BPM | SYSTOLIC BLOOD PRESSURE: 131 MMHG

## 2024-09-04 VITALS
WEIGHT: 182.2 LBS | HEART RATE: 73 BPM | RESPIRATION RATE: 18 BRPM | HEIGHT: 66 IN | TEMPERATURE: 97.1 F | OXYGEN SATURATION: 97 % | SYSTOLIC BLOOD PRESSURE: 131 MMHG | DIASTOLIC BLOOD PRESSURE: 78 MMHG | BODY MASS INDEX: 29.28 KG/M2

## 2024-09-04 DIAGNOSIS — G62.9 POLYNEUROPATHY, UNSPECIFIED: ICD-10-CM

## 2024-09-04 DIAGNOSIS — I89.0 LYMPHEDEMA, NOT ELSEWHERE CLASSIFIED: ICD-10-CM

## 2024-09-04 DIAGNOSIS — C50.911 MALIGNANT NEOPLASM OF UNSPECIFIED SITE OF RIGHT FEMALE BREAST: ICD-10-CM

## 2024-09-04 DIAGNOSIS — Z90.49 ACQUIRED ABSENCE OF OTHER SPECIFIED PARTS OF DIGESTIVE TRACT: Chronic | ICD-10-CM

## 2024-09-04 DIAGNOSIS — Z98.890 OTHER SPECIFIED POSTPROCEDURAL STATES: Chronic | ICD-10-CM

## 2024-09-04 DIAGNOSIS — Z96.649 PRESENCE OF UNSPECIFIED ARTIFICIAL HIP JOINT: Chronic | ICD-10-CM

## 2024-09-04 DIAGNOSIS — Z90.89 ACQUIRED ABSENCE OF OTHER ORGANS: Chronic | ICD-10-CM

## 2024-09-04 LAB
ALBUMIN SERPL ELPH-MCNC: 3.8 G/DL
ALP BLD-CCNC: 83 U/L
ALT SERPL-CCNC: 17 U/L
ANION GAP SERPL CALC-SCNC: 4 MMOL/L
AST SERPL-CCNC: 25 U/L
BILIRUB SERPL-MCNC: 0.5 MG/DL
BUN SERPL-MCNC: 14 MG/DL
CALCIUM SERPL-MCNC: 9.3 MG/DL
CHLORIDE SERPL-SCNC: 111 MMOL/L
CO2 SERPL-SCNC: 28 MMOL/L
CREAT SERPL-MCNC: 0.9 MG/DL
EGFR: 69 ML/MIN/1.73M2
GLUCOSE SERPL-MCNC: 98 MG/DL
HCT VFR BLD CALC: 39.2 %
HGB BLD-MCNC: 13.2 G/DL
LYMPHOCYTES # BLD AUTO: 1.4 K/UL
LYMPHOCYTES NFR BLD AUTO: 24.2 %
MAN DIFF?: NO
MCHC RBC-ENTMCNC: 28.8 PG
MCHC RBC-ENTMCNC: 33.7 GM/DL
MCV RBC AUTO: 85.6 FL
NEUTROPHILS # BLD AUTO: 3.9 K/UL
NEUTROPHILS NFR BLD AUTO: 66.3 %
PLATELET # BLD AUTO: 218 K/UL
POTASSIUM SERPL-SCNC: 4.8 MMOL/L
PROT SERPL-MCNC: 7 G/DL
RBC # BLD: 4.58 M/UL
RBC # FLD: 12.7 %
SODIUM SERPL-SCNC: 143 MMOL/L
WBC # FLD AUTO: 5.9 K/UL

## 2024-09-04 PROCEDURE — 96401 CHEMO ANTI-NEOPL SQ/IM: CPT

## 2024-09-04 PROCEDURE — G2211 COMPLEX E/M VISIT ADD ON: CPT

## 2024-09-04 PROCEDURE — 36415 COLL VENOUS BLD VENIPUNCTURE: CPT

## 2024-09-04 PROCEDURE — 99214 OFFICE O/P EST MOD 30 MIN: CPT

## 2024-09-04 RX ORDER — UBIDECARENONE/VIT E ACET 100MG-5
25 MCG CAPSULE ORAL
Refills: 0 | Status: ACTIVE | COMMUNITY
Start: 2024-09-04

## 2024-09-04 RX ORDER — MAGNESIUM OXIDE/MAG AA CHELATE 300 MG
300 CAPSULE ORAL
Refills: 0 | Status: ACTIVE | COMMUNITY
Start: 2024-09-04

## 2024-09-04 RX ORDER — VITAMIN B COMPLEX
CAPSULE ORAL
Refills: 0 | Status: ACTIVE | COMMUNITY
Start: 2024-09-04

## 2024-09-04 RX ORDER — PERTUZUMAB, TRASTUZUMAB, AND HYALURONIDASE-ZZXF 600; 600; 2000 MG/10ML; MG/10ML; U/10ML
10 INJECTION, SOLUTION SUBCUTANEOUS ONCE
Refills: 0 | Status: COMPLETED | OUTPATIENT
Start: 2024-09-04 | End: 2024-09-04

## 2024-09-04 RX ADMIN — PERTUZUMAB, TRASTUZUMAB, AND HYALURONIDASE-ZZXF 10 MILLILITER(S): 600; 600; 2000 INJECTION, SOLUTION SUBCUTANEOUS at 11:52

## 2024-09-04 NOTE — ASSESSMENT
[FreeTextEntry1] : 68F with multicentric right breast invasive ductal carcinoma ER/IN negative, HER-2 positive. cT3 (area of concern measuring 8.7cm on MRI, possibly multicentric) cN0 Stage IIB. NAC -Taxol/Carbo wkly x12 carbo dc'd due to myelosuppression received 11/12 taxol held for neuropathy and Phesgo (started 6/21/23) every 3 weeks, s/p B/L mastectomy on 12/5/23 for R IDC and L DCIS with PCR. To complete 1 year Phesgo (last Phesgo to be sched 9/25/24). Last ECHO 6/12/24 EF 59%. Recommend 100mg Gabapentin for neuropathy, 1-3 tabs qhs. - discussed in detail, reviewe exercise - consider acupuncture Labs today: CBC w Diff, CMP, iron studies, Mg. F/U 6 weeks. Phesgo today and in 3 wks. PT for lymphedema Lt UE. Reviewed hand and arm care   [With Patient/Caregiver] : With Patient/Caregiver [Designated Health Care Proxy] : Designated Health Care Proxy [Name: ___] : Name: [unfilled] [Relationship: ___] : Relationship: [unfilled]

## 2024-09-04 NOTE — ASSESSMENT
[FreeTextEntry1] : 68F with multicentric right breast invasive ductal carcinoma ER/[place] negative, HER-2 positive. cT3 (area of concern measuring 8.7cm on MRI, possibly multicentric) cN0 Stage IIB. NAC -Taxol/Carbo wkly x12 carbo dc'd due to myelosuppression received 11/12 taxol held for neuropathy and Phesgo (started 6/21/23) every 3 weeks, s/p B/L mastectomy on 12/5/23 for R IDC and L DCIS with PCR. To complete 1 year Phesgo (last Phesgo to be sched 9/25/24). Last ECHO 6/12/24 EF 59%. Recommend 100mg Gabapentin for neuropathy, 1-3 tabs qhs. - discussed in detail, reviewe exercise - consider acupuncture Labs today: CBC w Diff, CMP, iron studies, Mg. F/U 6 weeks. Phesgo today and in 3 wks. PT for lymphedema Lt UE. Reviewed hand and arm care   [With Patient/Caregiver] : With Patient/Caregiver [Designated Health Care Proxy] : Designated Health Care Proxy [Name: ___] : Name: [unfilled] [Relationship: ___] : Relationship: [unfilled]

## 2024-09-05 LAB — 25(OH)D3 SERPL-MCNC: 29 NG/ML

## 2024-09-11 ENCOUNTER — APPOINTMENT (OUTPATIENT)
Dept: HEART AND VASCULAR | Facility: CLINIC | Age: 69
End: 2024-09-11
Payer: MEDICARE

## 2024-09-11 DIAGNOSIS — I25.10 ATHEROSCLEROTIC HEART DISEASE OF NATIVE CORONARY ARTERY W/OUT ANGINA PECTORIS: ICD-10-CM

## 2024-09-11 PROCEDURE — 99441: CPT

## 2024-09-17 ENCOUNTER — APPOINTMENT (OUTPATIENT)
Dept: PHYSICAL MEDICINE AND REHAB | Facility: CLINIC | Age: 69
End: 2024-09-17
Payer: MEDICARE

## 2024-09-17 PROCEDURE — G2211 COMPLEX E/M VISIT ADD ON: CPT

## 2024-09-17 PROCEDURE — 99214 OFFICE O/P EST MOD 30 MIN: CPT

## 2024-09-17 NOTE — ADDENDUM
[FreeTextEntry1] : Addendum 7/18/2024: Patient would benefit from the use of a compression sleeve to manage breast cancer-related lymphedema.

## 2024-09-17 NOTE — PHYSICAL EXAM
[FreeTextEntry1] : Gen: Patient is A&O x 3, NAD HEENT: EOMI, hearing grossly normal Resp: regular, non-labored Abd: No visible distension   Spine:   Inspection: Protracted shoulders. Normal thoracic kyphosis.    Palpation: No tenderness to palpation of midline structures   ROM: full and pain-free. No pain with oblique extension or transitional movements   Breast: s/p biateral mastectomy with implants. No axillary cording appreciated, incision healed and non-painful, mild fibrotic tissue texture changes   Lymph: no clubbing, cyanosis of fingers. compression sleeve donned.    Extremities:    Inspection: Normal bulk with no evidence of atrophy of extremities    ROM: Full functional ROM.    Palpation: No pain with palpation of joints and soft tissues.     Special tests:      Shoulder: (-) Neer's, (-) Hawkin's  Neuro:   Sensation: grossly intact to light touch, except impaired sensation plantar aspects of feet   Coordination: FTN intact, alternating finger taps intact   Strength: Demonstrates sit to stand without use of hands. Grossly 5/5 strength throughout without focal weakness   Functional:   Gait: normal step length, antalgic due to right hip pain, using straight cane for off loading

## 2024-09-17 NOTE — HISTORY OF PRESENT ILLNESS
[FreeTextEntry1] : Ms. KRISS PATTERSON is a 68 year old female with newly diagnosed RIGHT breast cancer (multicentric invasive ductal carcinoma, ER/[place] neg, HER-2 positive) s/p neoadjuvant chemotherapy (carbo/taxol), currently receiving Phesgo. Underwent bilateral mastectomy with TE 2023 followed by bilateral implant exchange with capsulectomy on 2024. Referred to rehab medicine for lymphedema management and functional impairments.    Pertinent PMHx: CAD --------------------------------------------------- 2024 -- Clinic Follow up Currently enrolled in breast lymphedema therapy. Obtained her compression sleeve and it tolerating it well. States her swelling and pain has significantly improved. Neuropathy has improved in her hands but persist in her feet. Reports burning and numbness, especially at night. Also0 reporting cramping sensation of feet and calves. She sees Dr. Canchola who prescribed medical cannabis and feels like it helps her sleep. She is not interested in taking medications at this time due to concern about side effects. No recent falls. Still using straight cane but more for right hip issues.  Taking avg of 10K steps a day. States she lost some weight and appetite is improving.   2024 -- Initial Evaluation:  * LUE/ breast swelling - She is currently enrolled in breast-focused PT at [facility] who had recently measured her arm and noted slightly more swelling. She reports some symptoms of fullness and heaviness in left breast area. Her arm ROM is full and non painful. She is currently using a compression bra with Swell Spot. She does not notice significant swelling in her arm.  * Lower extremity paresthesias - developed peripheral neuropathy from chemo. Sees Dr. Canchola and started CBD with some improvement. Now with mostly impaired sensory complaints.  * Gait difficulties - attributes this to right hip pain due to OA and peripheral neuropathy --------------------------------------------------- Functional Performance Status: - KPS: 100 - ECO - ADLs/ iADLs: Independent - Mobility: Independent - Physical Activities: Percussionist. Enjoys dancing.  --------------------------------------------------- Social History: Lives in [place], [place] ---------------------------------------------------

## 2024-09-17 NOTE — ASSESSMENT
[FreeTextEntry1] : Ms. KRISS PATTERSON is a 68 year old female with newly diagnosed RIGHT breast cancer (multicentric invasive ductal carcinoma, ER/[place] neg, HER-2 positive) s/p neoadjuvant chemotherapy (carbo/taxol), currently receiving Phesgo. Underwent bilateral mastectomy with TE followed by bilateral implant exchange with capsulectomy. Referred to rehab medicine for lymphedema management and functional impairments.  Problems / Impression: * Breast- cancer related lymphedema - LUE and breast. Improving with compression sleeve and lymph therapy * Length dependent peripheral neuropathy - secondary to chemotherapy (carbo/taxol). Symptoms improving but persist in feet with sensory/proprioceptive impairments. * Leg cramps - potentially sequelae of neuropathy * Gait dysfunction - multifactorial in the setting of right hip OA and neuropathy  Plan/ Recommendations: - Imaging/ Work-up: none indicated at this time - Therapy:   > Continue lymphedema/ breast PT at [facility].    > Can consider focused PT for gait/ balance. She would like to focus on lymph therapy for now - Medications:   > Follow up with Dr. Salinas for pain medication management. Currently on CBD with improvement   > She defers neuropathic agents at this time - Recommended foot massage with lidocaine gel at night for pain and cramps - Trial of athletic compression socks to improve sensory/ proprioception, and gait mechanics.  - DME:   > Obtained compression sleeve. Discussed to wear while drumming and exercise. - Referrals:   > Referral provided for medical acupuncture - Education/ Counseling: We discussed the importance of physical activity, ergonomics, and posture in the management of this condition and overall health benefits. Lymphedema education provided - Exercise Rx: Discussed activity/ exercise modifications. Progress exercise regimen to include pelvic girdle strengthening and balance exercise. Handout provided. Continue walking program 10K steps a day - Follow-up: 3 months prior to her trip in Feb   The patient expressed verbal understanding and is in agreement with the plan of care. All of the patient's questions and concerns were addressed during today's visit.

## 2024-09-18 ENCOUNTER — APPOINTMENT (OUTPATIENT)
Dept: PALLIATIVE MEDICINE | Facility: CLINIC | Age: 69
End: 2024-09-18
Payer: MEDICARE

## 2024-09-18 VITALS
RESPIRATION RATE: 18 BRPM | DIASTOLIC BLOOD PRESSURE: 76 MMHG | BODY MASS INDEX: 28.97 KG/M2 | OXYGEN SATURATION: 98 % | SYSTOLIC BLOOD PRESSURE: 127 MMHG | TEMPERATURE: 98.2 F | WEIGHT: 180.25 LBS | HEIGHT: 66 IN | HEART RATE: 80 BPM

## 2024-09-18 DIAGNOSIS — G62.0 DRUG-INDUCED POLYNEUROPATHY: ICD-10-CM

## 2024-09-18 DIAGNOSIS — C50.911 MALIGNANT NEOPLASM OF UNSPECIFIED SITE OF RIGHT FEMALE BREAST: ICD-10-CM

## 2024-09-18 DIAGNOSIS — I89.0 LYMPHEDEMA, NOT ELSEWHERE CLASSIFIED: ICD-10-CM

## 2024-09-18 DIAGNOSIS — Z51.5 ENCOUNTER FOR PALLIATIVE CARE: ICD-10-CM

## 2024-09-18 PROCEDURE — 99215 OFFICE O/P EST HI 40 MIN: CPT

## 2024-09-18 NOTE — HISTORY OF PRESENT ILLNESS
[FreeTextEntry1] : 67 yo F with CAD (seen by cardiology in past for ?tachycardia as per patient but w/o follow up) and breast cancer here for palliative evaluation.  Onc Hx: The patient's history began when she underwent bilateral screening mammogram on 4/13/2023. Of note, the patient's last breast imaging was in 2021. She states that she was dealing with illness in her mother and subsequent death of her mother in 2022. She was found to have scattered fibroglandular density and spiculated masses in her breast. Right breast biopsy of the 2.2 cm spiculated mass which returned as invasive ductal carcinoma, poorly differentiated, ER negative, DC negative, HER2 positive.  Patient is status post neoadjuvant chemotherapy completed on 9/27/2023. Patient ultimately elected to proceed with bilateral mastectomies she is status post bilateral mastectomies, right SLNB and left mag trace, with bilateral tissue expander based reconstruction with Dr. Phelan on 12/5/2023 final surgical pathology yielded no residual carcinoma.  Providers: Oncologist - Brandee Marsh Breast Surgery - Xochilt Roy  Interval Hx: Last seen in palliative care office 9 months ago. Saw Cancer Rehab Dr. Hampton yesterday, undergoing lymphedema therapy with improvement in lymphedema pain. Taking cannabis which improves overnight pain and was referred for acupuncture.  SYMPTOMS: #Pain Neuropathy in hands and feet that started last year. Has improved since stopping chemo last year but still takes cannabis at night which helps prevent waking from pain. Never started gabapentin due to fears of side effects. Was referred for acupuncture but has not yet made an appointment. 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 Close [place] of friends and has an aunt who is her age who are her support system.  and . Grown son who lives with her. Used to use marijuana regularly in the 70s but now doesn't use it much anymore. Not since starting chemo.   Advanced Directives: HCP - Agusto Lopez (aunt, HCP) [phone] MOLST - None

## 2024-09-18 NOTE — PHYSICAL EXAM
[General Appearance - In No Acute Distress] : in no acute distress [General Appearance - Alert] : alert [Sclera] : the sclera and conjunctiva were normal [PERRL With Normal Accommodation] : pupils were equal in size, round, and reactive to light [Extraocular Movements] : extraocular movements were intact [No Oral Pallor] : no oral pallor [No Oral Cyanosis] : no oral cyanosis [Outer Ear] : the ears and nose were normal in appearance [Hearing Threshold Finger Rub Not [place]] : hearing was normal [Neck Appearance] : the appearance of the neck was normal [Neck Cervical Mass (___cm)] : no neck mass was observed [Respiration, Rhythm And Depth] : normal respiratory rhythm and effort [Auscultation Breath Sounds / Voice Sounds] : lungs were clear to auscultation bilaterally [Heart Rate And Rhythm] : heart rate was normal and rhythm regular [Heart Sounds] : normal S1 and S2 [Murmurs] : no murmurs [Bowel Sounds] : normal bowel sounds [Abdomen Soft] : soft [Abdomen Tenderness] : non-tender [Abdomen Mass (___ Cm)] : no abdominal mass palpated [Skin Color & Pigmentation] : normal skin color and pigmentation [Skin Turgor] : normal skin turgor [] : no rash [Oriented To Time, Place, And Person] : oriented to person, place, and time [Impaired Insight] : insight and judgment were intact [Affect] : the affect was normal

## 2024-09-18 NOTE — ASSESSMENT
[FreeTextEntry1] : - 69 yo F with CAD (seen by cardiology in past for ?tachycardia as per patient but w/o follow up) and breast cancer here for palliative evaluation.  #Peripheral Neuropathy Due to chemotherapy. - C/w medical cannabis. Certification completed previously - Start gabapentin 100mg PO daily. Counseled to increase by one dose weekly until taking 100mg TID. Patient agreeable. - Acupuncture referral made by Dr. Hampton  #ACP - HCP: Agusto Lopez (aunt) - [phone] - Code status: Full code (discussed 1/2024)  F/u in 2 months

## 2024-09-23 NOTE — PHARMACY COMMUNICATION NOTE - COMMENTS
Email to Dr. Gunderson:    I am reviewing the [facility] order for Tanvi Gomez (: 1955). Her last echo was done on 24, she would be due for another one. Is she scheduled to receive an echo and are we okay to treat on Wednesday with results from ?     Response:  Yes, that would be fine. Will order new

## 2024-09-25 ENCOUNTER — OUTPATIENT (OUTPATIENT)
Dept: OUTPATIENT SERVICES | Facility: HOSPITAL | Age: 69
LOS: 1 days | End: 2024-09-25
Payer: MEDICARE

## 2024-09-25 ENCOUNTER — APPOINTMENT (OUTPATIENT)
Dept: INFUSION THERAPY | Facility: CLINIC | Age: 69
End: 2024-09-25

## 2024-09-25 VITALS
OXYGEN SATURATION: 98 % | TEMPERATURE: 97 F | HEIGHT: 66 IN | WEIGHT: 181 LBS | HEART RATE: 74 BPM | SYSTOLIC BLOOD PRESSURE: 131 MMHG | DIASTOLIC BLOOD PRESSURE: 76 MMHG | RESPIRATION RATE: 18 BRPM

## 2024-09-25 DIAGNOSIS — Z90.89 ACQUIRED ABSENCE OF OTHER ORGANS: Chronic | ICD-10-CM

## 2024-09-25 DIAGNOSIS — Z98.890 OTHER SPECIFIED POSTPROCEDURAL STATES: Chronic | ICD-10-CM

## 2024-09-25 DIAGNOSIS — Z90.49 ACQUIRED ABSENCE OF OTHER SPECIFIED PARTS OF DIGESTIVE TRACT: Chronic | ICD-10-CM

## 2024-09-25 DIAGNOSIS — C50.911 MALIGNANT NEOPLASM OF UNSPECIFIED SITE OF RIGHT FEMALE BREAST: ICD-10-CM

## 2024-09-25 DIAGNOSIS — Z96.649 PRESENCE OF UNSPECIFIED ARTIFICIAL HIP JOINT: Chronic | ICD-10-CM

## 2024-09-25 LAB
ALBUMIN SERPL ELPH-MCNC: 3.9 G/DL — SIGNIFICANT CHANGE UP (ref 3.3–5)
ALP SERPL-CCNC: 94 U/L — SIGNIFICANT CHANGE UP (ref 40–120)
ALT FLD-CCNC: 13 U/L — SIGNIFICANT CHANGE UP (ref 10–45)
ANION GAP SERPL CALC-SCNC: -1 MMOL/L — LOW (ref 5–17)
AST SERPL-CCNC: 25 U/L — SIGNIFICANT CHANGE UP (ref 10–40)
BILIRUB SERPL-MCNC: 0.7 MG/DL — SIGNIFICANT CHANGE UP (ref 0.2–1.2)
BUN SERPL-MCNC: 16 MG/DL — SIGNIFICANT CHANGE UP (ref 7–23)
CALCIUM SERPL-MCNC: 9.8 MG/DL — SIGNIFICANT CHANGE UP (ref 8.4–10.5)
CHLORIDE SERPL-SCNC: 109 MMOL/L — HIGH (ref 96–108)
CO2 SERPL-SCNC: 30 MMOL/L — SIGNIFICANT CHANGE UP (ref 22–31)
CREAT SERPL-MCNC: 0.8 MG/DL — SIGNIFICANT CHANGE UP (ref 0.5–1.3)
EGFR: 79 ML/MIN/1.73M2 — SIGNIFICANT CHANGE UP
GLUCOSE SERPL-MCNC: 97 MG/DL — SIGNIFICANT CHANGE UP (ref 70–99)
HCT VFR BLD CALC: 39 % — SIGNIFICANT CHANGE UP (ref 34.5–45)
HGB BLD-MCNC: 13 G/DL — SIGNIFICANT CHANGE UP (ref 11.5–15.5)
LYMPHOCYTES # BLD AUTO: 1.5 K/UL — SIGNIFICANT CHANGE UP (ref 1–3.3)
LYMPHOCYTES # BLD AUTO: 24 % — SIGNIFICANT CHANGE UP (ref 13–44)
MCHC RBC-ENTMCNC: 28.6 PG — SIGNIFICANT CHANGE UP (ref 27–34)
MCHC RBC-ENTMCNC: 33.3 GM/DL — SIGNIFICANT CHANGE UP (ref 32–36)
MCV RBC AUTO: 85.9 FL — SIGNIFICANT CHANGE UP (ref 80–100)
NEUTROPHILS # BLD AUTO: 4.2 K/UL — SIGNIFICANT CHANGE UP (ref 1.8–7.4)
NEUTROPHILS NFR BLD AUTO: 68.3 % — SIGNIFICANT CHANGE UP (ref 43–77)
PLATELET # BLD AUTO: 228 K/UL — SIGNIFICANT CHANGE UP (ref 150–400)
POTASSIUM SERPL-MCNC: 4.5 MMOL/L — SIGNIFICANT CHANGE UP (ref 3.5–5.3)
POTASSIUM SERPL-SCNC: 4.5 MMOL/L — SIGNIFICANT CHANGE UP (ref 3.5–5.3)
PROT SERPL-MCNC: 7.1 G/DL — SIGNIFICANT CHANGE UP (ref 6–8.3)
RBC # BLD: 4.54 M/UL — SIGNIFICANT CHANGE UP (ref 3.8–5.2)
RBC # FLD: 12.7 % — SIGNIFICANT CHANGE UP (ref 10.3–14.5)
SODIUM SERPL-SCNC: 138 MMOL/L — SIGNIFICANT CHANGE UP (ref 135–145)
WBC # BLD: 6.2 K/UL — SIGNIFICANT CHANGE UP (ref 3.8–10.5)
WBC # FLD AUTO: 6.2 K/UL — SIGNIFICANT CHANGE UP (ref 3.8–10.5)

## 2024-09-25 PROCEDURE — 85025 COMPLETE CBC W/AUTO DIFF WBC: CPT

## 2024-09-25 PROCEDURE — 96401 CHEMO ANTI-NEOPL SQ/IM: CPT

## 2024-09-25 PROCEDURE — 80053 COMPREHEN METABOLIC PANEL: CPT

## 2024-09-25 PROCEDURE — 36415 COLL VENOUS BLD VENIPUNCTURE: CPT

## 2024-09-25 RX ORDER — PERTUZUMAB, TRASTUZUMAB, AND HYALURONIDASE-ZZXF 600; 600; 2000 MG/10ML; MG/10ML; U/10ML
10 INJECTION, SOLUTION SUBCUTANEOUS ONCE
Refills: 0 | Status: COMPLETED | OUTPATIENT
Start: 2024-09-25 | End: 2024-09-25

## 2024-09-25 RX ADMIN — PERTUZUMAB, TRASTUZUMAB, AND HYALURONIDASE-ZZXF 10 MILLILITER(S): 600; 600; 2000 INJECTION, SOLUTION SUBCUTANEOUS at 12:03

## 2024-10-16 ENCOUNTER — APPOINTMENT (OUTPATIENT)
Dept: HEMATOLOGY ONCOLOGY | Facility: CLINIC | Age: 69
End: 2024-10-16
Payer: COMMERCIAL

## 2024-10-16 VITALS
RESPIRATION RATE: 18 BRPM | SYSTOLIC BLOOD PRESSURE: 145 MMHG | OXYGEN SATURATION: 96 % | HEART RATE: 93 BPM | WEIGHT: 186 LBS | HEIGHT: 66 IN | BODY MASS INDEX: 29.89 KG/M2 | TEMPERATURE: 98.2 F | DIASTOLIC BLOOD PRESSURE: 87 MMHG

## 2024-10-16 DIAGNOSIS — Z90.13 ACQUIRED ABSENCE OF BILATERAL BREASTS AND NIPPLES: ICD-10-CM

## 2024-10-16 DIAGNOSIS — D50.8 OTHER IRON DEFICIENCY ANEMIAS: ICD-10-CM

## 2024-10-16 DIAGNOSIS — G62.0 DRUG-INDUCED POLYNEUROPATHY: ICD-10-CM

## 2024-10-16 DIAGNOSIS — C50.911 MALIGNANT NEOPLASM OF UNSPECIFIED SITE OF RIGHT FEMALE BREAST: ICD-10-CM

## 2024-10-16 DIAGNOSIS — Z17.31 MALIGNANT NEOPLASM OF UNSPECIFIED SITE OF RIGHT FEMALE BREAST: ICD-10-CM

## 2024-10-16 DIAGNOSIS — I89.0 LYMPHEDEMA, NOT ELSEWHERE CLASSIFIED: ICD-10-CM

## 2024-10-16 LAB
ALBUMIN SERPL ELPH-MCNC: 3.6 G/DL
ALP BLD-CCNC: 94 U/L
ALT SERPL-CCNC: 16 U/L
ANION GAP SERPL CALC-SCNC: -4 MMOL/L
AST SERPL-CCNC: 28 U/L
BILIRUB SERPL-MCNC: 0.6 MG/DL
BUN SERPL-MCNC: 15 MG/DL
CALCIUM SERPL-MCNC: 9.8 MG/DL
CHLORIDE SERPL-SCNC: 113 MMOL/L
CO2 SERPL-SCNC: 29 MMOL/L
CREAT SERPL-MCNC: 0.8 MG/DL
EGFR: 79 ML/MIN/1.73M2
GLUCOSE SERPL-MCNC: 112 MG/DL
HCT VFR BLD CALC: 38.7 %
HGB BLD-MCNC: 13 G/DL
LYMPHOCYTES # BLD AUTO: 1.7 K/UL
LYMPHOCYTES NFR BLD AUTO: 30.6 %
MAN DIFF?: NO
MCHC RBC-ENTMCNC: 29 PG
MCHC RBC-ENTMCNC: 33.6 GM/DL
MCV RBC AUTO: 86.2 FL
NEUTROPHILS # BLD AUTO: 3.6 K/UL
NEUTROPHILS NFR BLD AUTO: 63.6 %
PLATELET # BLD AUTO: 216 K/UL
POTASSIUM SERPL-SCNC: 3.6 MMOL/L
PROT SERPL-MCNC: 6.8 G/DL
RBC # BLD: 4.49 M/UL
RBC # FLD: 12.7 %
SODIUM SERPL-SCNC: 138 MMOL/L
WBC # FLD AUTO: 5.6 K/UL

## 2024-10-16 PROCEDURE — 36415 COLL VENOUS BLD VENIPUNCTURE: CPT

## 2024-10-16 PROCEDURE — 99214 OFFICE O/P EST MOD 30 MIN: CPT

## 2024-10-16 PROCEDURE — G2211 COMPLEX E/M VISIT ADD ON: CPT

## 2024-10-17 LAB — 25(OH)D3 SERPL-MCNC: 31.2 NG/ML

## 2024-11-27 ENCOUNTER — APPOINTMENT (OUTPATIENT)
Dept: PALLIATIVE MEDICINE | Facility: CLINIC | Age: 69
End: 2024-11-27

## 2024-11-27 VITALS
HEART RATE: 87 BPM | BODY MASS INDEX: 30.05 KG/M2 | WEIGHT: 187 LBS | DIASTOLIC BLOOD PRESSURE: 81 MMHG | HEIGHT: 66 IN | SYSTOLIC BLOOD PRESSURE: 134 MMHG | RESPIRATION RATE: 18 BRPM | OXYGEN SATURATION: 95 % | TEMPERATURE: 98.2 F

## 2024-11-27 DIAGNOSIS — C50.911 MALIGNANT NEOPLASM OF UNSPECIFIED SITE OF RIGHT FEMALE BREAST: ICD-10-CM

## 2024-11-27 DIAGNOSIS — Z51.5 ENCOUNTER FOR PALLIATIVE CARE: ICD-10-CM

## 2024-11-27 DIAGNOSIS — G62.9 POLYNEUROPATHY, UNSPECIFIED: ICD-10-CM

## 2024-11-27 DIAGNOSIS — G62.0 DRUG-INDUCED POLYNEUROPATHY: ICD-10-CM

## 2024-11-27 PROCEDURE — 99214 OFFICE O/P EST MOD 30 MIN: CPT

## 2024-12-11 ENCOUNTER — OUTPATIENT (OUTPATIENT)
Dept: OUTPATIENT SERVICES | Facility: HOSPITAL | Age: 69
LOS: 1 days | End: 2024-12-11
Payer: MEDICARE

## 2024-12-11 ENCOUNTER — RESULT REVIEW (OUTPATIENT)
Age: 69
End: 2024-12-11

## 2024-12-11 DIAGNOSIS — Z98.890 OTHER SPECIFIED POSTPROCEDURAL STATES: Chronic | ICD-10-CM

## 2024-12-11 DIAGNOSIS — Z90.49 ACQUIRED ABSENCE OF OTHER SPECIFIED PARTS OF DIGESTIVE TRACT: Chronic | ICD-10-CM

## 2024-12-11 DIAGNOSIS — C50.911 MALIGNANT NEOPLASM OF UNSPECIFIED SITE OF RIGHT FEMALE BREAST: ICD-10-CM

## 2024-12-11 DIAGNOSIS — Z90.89 ACQUIRED ABSENCE OF OTHER ORGANS: Chronic | ICD-10-CM

## 2024-12-11 DIAGNOSIS — Z96.649 PRESENCE OF UNSPECIFIED ARTIFICIAL HIP JOINT: Chronic | ICD-10-CM

## 2024-12-11 PROCEDURE — 93306 TTE W/DOPPLER COMPLETE: CPT

## 2024-12-11 PROCEDURE — 93306 TTE W/DOPPLER COMPLETE: CPT | Mod: 26

## 2024-12-11 PROCEDURE — 93356 MYOCRD STRAIN IMG SPCKL TRCK: CPT

## 2024-12-18 ENCOUNTER — APPOINTMENT (OUTPATIENT)
Dept: PHYSICAL MEDICINE AND REHAB | Facility: CLINIC | Age: 69
End: 2024-12-18
Payer: MEDICARE

## 2024-12-18 VITALS
HEIGHT: 66 IN | WEIGHT: 182 LBS | HEART RATE: 84 BPM | SYSTOLIC BLOOD PRESSURE: 123 MMHG | DIASTOLIC BLOOD PRESSURE: 81 MMHG | BODY MASS INDEX: 29.25 KG/M2 | OXYGEN SATURATION: 98 %

## 2024-12-18 PROCEDURE — 99214 OFFICE O/P EST MOD 30 MIN: CPT

## 2025-02-03 ENCOUNTER — NON-APPOINTMENT (OUTPATIENT)
Age: 70
End: 2025-02-03

## 2025-02-03 ENCOUNTER — APPOINTMENT (OUTPATIENT)
Dept: HEART AND VASCULAR | Facility: CLINIC | Age: 70
End: 2025-02-03
Payer: MEDICARE

## 2025-02-03 VITALS
DIASTOLIC BLOOD PRESSURE: 62 MMHG | SYSTOLIC BLOOD PRESSURE: 125 MMHG | OXYGEN SATURATION: 98 % | TEMPERATURE: 97.8 F | HEIGHT: 66 IN | BODY MASS INDEX: 29.89 KG/M2 | WEIGHT: 186 LBS | HEART RATE: 72 BPM

## 2025-02-03 DIAGNOSIS — R73.09 OTHER ABNORMAL GLUCOSE: ICD-10-CM

## 2025-02-03 DIAGNOSIS — I25.10 ATHEROSCLEROTIC HEART DISEASE OF NATIVE CORONARY ARTERY W/OUT ANGINA PECTORIS: ICD-10-CM

## 2025-02-03 PROCEDURE — 99215 OFFICE O/P EST HI 40 MIN: CPT

## 2025-02-03 PROCEDURE — G2211 COMPLEX E/M VISIT ADD ON: CPT

## 2025-02-03 PROCEDURE — 93000 ELECTROCARDIOGRAM COMPLETE: CPT

## 2025-02-12 ENCOUNTER — APPOINTMENT (OUTPATIENT)
Dept: HEMATOLOGY ONCOLOGY | Facility: CLINIC | Age: 70
End: 2025-02-12
Payer: COMMERCIAL

## 2025-02-12 VITALS
BODY MASS INDEX: 30.22 KG/M2 | DIASTOLIC BLOOD PRESSURE: 77 MMHG | HEART RATE: 88 BPM | SYSTOLIC BLOOD PRESSURE: 128 MMHG | TEMPERATURE: 98.7 F | OXYGEN SATURATION: 95 % | HEIGHT: 66 IN | RESPIRATION RATE: 18 BRPM | WEIGHT: 188 LBS

## 2025-02-12 DIAGNOSIS — Z17.31 MALIGNANT NEOPLASM OF UNSPECIFIED SITE OF RIGHT FEMALE BREAST: ICD-10-CM

## 2025-02-12 DIAGNOSIS — C50.911 MALIGNANT NEOPLASM OF UNSPECIFIED SITE OF RIGHT FEMALE BREAST: ICD-10-CM

## 2025-02-12 DIAGNOSIS — G62.9 POLYNEUROPATHY, UNSPECIFIED: ICD-10-CM

## 2025-02-12 DIAGNOSIS — Z90.13 ACQUIRED ABSENCE OF BILATERAL BREASTS AND NIPPLES: ICD-10-CM

## 2025-02-12 DIAGNOSIS — D05.12 INTRADUCTAL CARCINOMA IN SITU OF LEFT BREAST: ICD-10-CM

## 2025-02-12 DIAGNOSIS — I89.0 LYMPHEDEMA, NOT ELSEWHERE CLASSIFIED: ICD-10-CM

## 2025-02-12 DIAGNOSIS — G62.0 DRUG-INDUCED POLYNEUROPATHY: ICD-10-CM

## 2025-02-12 LAB
ALBUMIN SERPL ELPH-MCNC: 3.6 G/DL
ALP BLD-CCNC: 79 U/L
ALT SERPL-CCNC: 10 U/L
ANION GAP SERPL CALC-SCNC: -1 MMOL/L
AST SERPL-CCNC: 21 U/L
BILIRUB SERPL-MCNC: 0.7 MG/DL
BUN SERPL-MCNC: 17 MG/DL
CALCIUM SERPL-MCNC: 9.4 MG/DL
CHLORIDE SERPL-SCNC: 112 MMOL/L
CO2 SERPL-SCNC: 29 MMOL/L
CREAT SERPL-MCNC: 0.8 MG/DL
EGFR: 79 ML/MIN/1.73M2
GLUCOSE SERPL-MCNC: 96 MG/DL
HCT VFR BLD CALC: 40.6 %
HGB BLD-MCNC: 13.7 G/DL
LYMPHOCYTES # BLD AUTO: 1.4 K/UL
LYMPHOCYTES NFR BLD AUTO: 20.7 %
MAN DIFF?: NO
MCHC RBC-ENTMCNC: 28.6 PG
MCHC RBC-ENTMCNC: 33.7 G/DL
MCV RBC AUTO: 84.8 FL
NEUTROPHILS # BLD AUTO: 4.4 K/UL
NEUTROPHILS NFR BLD AUTO: 67.7 %
PLATELET # BLD AUTO: 218 K/UL
POTASSIUM SERPL-SCNC: 5.4 MMOL/L
PROT SERPL-MCNC: 7 G/DL
RBC # BLD: 4.79 M/UL
RBC # FLD: 12.9 %
SODIUM SERPL-SCNC: 140 MMOL/L
WBC # FLD AUTO: 6.6 K/UL

## 2025-02-12 PROCEDURE — 99214 OFFICE O/P EST MOD 30 MIN: CPT | Mod: 25

## 2025-02-12 PROCEDURE — 36415 COLL VENOUS BLD VENIPUNCTURE: CPT

## 2025-02-12 RX ORDER — RIBOFLAVIN (VITAMIN B2) 100 MG
100 TABLET ORAL
Refills: 0 | Status: ACTIVE | COMMUNITY
Start: 2025-02-12

## 2025-02-13 LAB — 25(OH)D3 SERPL-MCNC: 32.6 NG/ML

## 2025-03-19 ENCOUNTER — APPOINTMENT (OUTPATIENT)
Dept: PHYSICAL MEDICINE AND REHAB | Facility: CLINIC | Age: 70
End: 2025-03-19
Payer: MEDICARE

## 2025-03-19 PROCEDURE — 99214 OFFICE O/P EST MOD 30 MIN: CPT

## 2025-03-21 ENCOUNTER — APPOINTMENT (OUTPATIENT)
Dept: BREAST CENTER | Facility: CLINIC | Age: 70
End: 2025-03-21
Payer: MEDICARE

## 2025-03-21 VITALS — BODY MASS INDEX: 30.05 KG/M2 | HEIGHT: 66 IN | WEIGHT: 187 LBS

## 2025-03-21 DIAGNOSIS — Z17.31 MALIGNANT NEOPLASM OF UNSPECIFIED SITE OF RIGHT FEMALE BREAST: ICD-10-CM

## 2025-03-21 DIAGNOSIS — C50.911 MALIGNANT NEOPLASM OF UNSPECIFIED SITE OF RIGHT FEMALE BREAST: ICD-10-CM

## 2025-03-21 DIAGNOSIS — Z08 ENCOUNTER FOR FOLLOW-UP EXAMINATION AFTER COMPLETED TREATMENT FOR MALIGNANT NEOPLASM: ICD-10-CM

## 2025-03-21 DIAGNOSIS — I89.0 LYMPHEDEMA, NOT ELSEWHERE CLASSIFIED: ICD-10-CM

## 2025-03-21 DIAGNOSIS — Z85.3 ENCOUNTER FOR FOLLOW-UP EXAMINATION AFTER COMPLETED TREATMENT FOR MALIGNANT NEOPLASM: ICD-10-CM

## 2025-03-21 DIAGNOSIS — Z90.13 ACQUIRED ABSENCE OF BILATERAL BREASTS AND NIPPLES: ICD-10-CM

## 2025-03-21 PROCEDURE — 99213 OFFICE O/P EST LOW 20 MIN: CPT

## 2025-06-19 ENCOUNTER — APPOINTMENT (OUTPATIENT)
Dept: HEMATOLOGY ONCOLOGY | Facility: CLINIC | Age: 70
End: 2025-06-19
Payer: COMMERCIAL

## 2025-06-19 VITALS
WEIGHT: 189.38 LBS | RESPIRATION RATE: 18 BRPM | BODY MASS INDEX: 30.44 KG/M2 | OXYGEN SATURATION: 96 % | TEMPERATURE: 98.3 F | DIASTOLIC BLOOD PRESSURE: 77 MMHG | SYSTOLIC BLOOD PRESSURE: 120 MMHG | HEART RATE: 87 BPM | HEIGHT: 66 IN

## 2025-06-19 LAB
ALBUMIN SERPL ELPH-MCNC: 3.5 G/DL
ALP BLD-CCNC: 77 U/L
ALT SERPL-CCNC: 15 U/L
ANION GAP SERPL CALC-SCNC: 6 MMOL/L
AST SERPL-CCNC: 23 U/L
BILIRUB SERPL-MCNC: 0.7 MG/DL
BUN SERPL-MCNC: 16 MG/DL
CALCIUM SERPL-MCNC: 9.3 MG/DL
CHLORIDE SERPL-SCNC: 109 MMOL/L
CO2 SERPL-SCNC: 26 MMOL/L
CREAT SERPL-MCNC: 0.9 MG/DL
EGFRCR SERPLBLD CKD-EPI 2021: 68 ML/MIN/1.73M2
GLUCOSE SERPL-MCNC: 89 MG/DL
HCT VFR BLD CALC: 37.7 %
HGB BLD-MCNC: 12.9 G/DL
LYMPHOCYTES # BLD AUTO: 1.4 K/UL
LYMPHOCYTES NFR BLD AUTO: 24.8 %
MAN DIFF?: NO
MCHC RBC-ENTMCNC: 29.1 PG
MCHC RBC-ENTMCNC: 34.2 G/DL
MCV RBC AUTO: 84.9 FL
NEUTROPHILS # BLD AUTO: 3.9 K/UL
NEUTROPHILS NFR BLD AUTO: 67.1 %
PLATELET # BLD AUTO: 251 K/UL
POTASSIUM SERPL-SCNC: 4.6 MMOL/L
PROT SERPL-MCNC: 6.9 G/DL
RBC # BLD: 4.44 M/UL
RBC # FLD: 13.1 %
SODIUM SERPL-SCNC: 141 MMOL/L
WBC # FLD AUTO: 5.8 K/UL

## 2025-06-19 PROCEDURE — 99214 OFFICE O/P EST MOD 30 MIN: CPT | Mod: 25

## 2025-06-19 PROCEDURE — 36415 COLL VENOUS BLD VENIPUNCTURE: CPT

## 2025-06-20 LAB — 25(OH)D3 SERPL-MCNC: 30 NG/ML

## 2025-08-04 ENCOUNTER — APPOINTMENT (OUTPATIENT)
Dept: HEART AND VASCULAR | Facility: CLINIC | Age: 70
End: 2025-08-04
Payer: MEDICARE

## 2025-08-04 VITALS
BODY MASS INDEX: 29.73 KG/M2 | DIASTOLIC BLOOD PRESSURE: 75 MMHG | SYSTOLIC BLOOD PRESSURE: 116 MMHG | OXYGEN SATURATION: 97 % | HEIGHT: 66 IN | TEMPERATURE: 97.4 F | WEIGHT: 185 LBS | HEART RATE: 80 BPM

## 2025-08-04 DIAGNOSIS — R73.09 OTHER ABNORMAL GLUCOSE: ICD-10-CM

## 2025-08-04 DIAGNOSIS — I25.10 ATHEROSCLEROTIC HEART DISEASE OF NATIVE CORONARY ARTERY W/OUT ANGINA PECTORIS: ICD-10-CM

## 2025-08-04 PROCEDURE — 99215 OFFICE O/P EST HI 40 MIN: CPT

## 2025-08-04 PROCEDURE — G2211 COMPLEX E/M VISIT ADD ON: CPT

## 2025-08-04 PROCEDURE — 93000 ELECTROCARDIOGRAM COMPLETE: CPT

## 2025-08-04 RX ORDER — UBIDECARENONE/VIT E ACET 100MG-5
CAPSULE ORAL
Refills: 0 | Status: ACTIVE | COMMUNITY

## (undated) DEVICE — Device

## (undated) DEVICE — ELCTR BOVIE PENCIL HANDPIECE ROCKER SWITCH 15FT

## (undated) DEVICE — ELCTR BOVIE TIP NEEDLE INSULATED 2.8" EDGE

## (undated) DEVICE — DRSG GAUZE MOISTURIZER 0.5 OZ 4X8

## (undated) DEVICE — MARKING PEN W RULER

## (undated) DEVICE — BLADE SURGICAL #10 CARBON

## (undated) DEVICE — SUT NYLON 9-0 5" DRM5

## (undated) DEVICE — SUT PROLENE 4-0 18" PS-2

## (undated) DEVICE — SYR LUER LOK 10CC

## (undated) DEVICE — SYR LUER LOK 5CC

## (undated) DEVICE — NDL NERVE STIM 22GA X 2IN 30 DEG

## (undated) DEVICE — SUT MONOCRYL 4-0 18" PS-2

## (undated) DEVICE — SUT VICRYL 2-0 27" SH UNDYED

## (undated) DEVICE — SOL ANTI FOG

## (undated) DEVICE — SUT SILK 4-0 18" PS-2

## (undated) DEVICE — MERCIAN VISABILITY BACKROUND GREEN

## (undated) DEVICE — NDL HYPO REGULAR BEVEL 25G X 1.5" (BLUE)

## (undated) DEVICE — DRSG STERISTRIPS 0.5 X 4"

## (undated) DEVICE — GLV 8 PROTEXIS (WHITE)

## (undated) DEVICE — SUT MONOCRYL 5-0 18" P-3 UNDYED

## (undated) DEVICE — PACK BREAST

## (undated) DEVICE — PACK BREAST RECONSTRUCTION

## (undated) DEVICE — NDL HYPO SAFE 25G X 1.5" (ORANGE)

## (undated) DEVICE — SYR LUER LOK 30CC

## (undated) DEVICE — SPONGE SURGICAL STRIP 1" X 6"

## (undated) DEVICE — SYR LUER LOK 3CC

## (undated) DEVICE — SUCTION YANKAUER BULBOUS TIP W VENT

## (undated) DEVICE — PACK UPPER BODY

## (undated) DEVICE — SUT NYLON 8-0 5" DRM6

## (undated) DEVICE — PREP CHLORAPREP HI-LITE ORANGE 26ML

## (undated) DEVICE — SUT MONOCRYL 4-0 18" P-3 UNDYED

## (undated) DEVICE — SUT NYLON 9-0 5" HSV6

## (undated) DEVICE — ELCTR BOVIE PENCIL SMOKE EVACUATION

## (undated) DEVICE — DRAPE TOP SHEET 53" X 101"

## (undated) DEVICE — VENODYNE/SCD SLEEVE CALF MEDIUM

## (undated) DEVICE — FOLEY TRAY 16FR 5CC LF UMETER CLOSED

## (undated) DEVICE — FUNNEL STRL 6 IND PK

## (undated) DEVICE — WOUND IRR IRRISEPT W 0.5 CHG

## (undated) DEVICE — STAPLER SKIN VISI-STAT 35 WIDE

## (undated) DEVICE — LAP PAD 18 X 18"

## (undated) DEVICE — WARMING BLANKET LOWER ADULT

## (undated) DEVICE — DRSG SURGICAL BRA LG 38-40

## (undated) DEVICE — GLV 8 PROTEXIS (CREAM) MICRO

## (undated) DEVICE — BIPOLAR FORCEP KIRWAN JEWELERS STR 4" X 0.4MM W 12FT CORD (GREEN)

## (undated) DEVICE — SUT MONOCRYL 3-0 18" PS-1

## (undated) DEVICE — SUT VICRYL 2-0 27" CT-1

## (undated) DEVICE — SPEAR SURG EYE WECK-CELL CELOS

## (undated) DEVICE — SUT STRATAFIX SPIRAL MONOCRYL PLUS 4-0 14CM PS-2 UNDYED

## (undated) DEVICE — BLADE SURGICAL #15 CARBON

## (undated) DEVICE — SIZER BREAST GEL STYL FX 605CC

## (undated) DEVICE — DRSG COMBINE 5X9"

## (undated) DEVICE — LONE STAR ELASTIC STAY HOOK 12MM BLUNT

## (undated) DEVICE — SUT STRATAFIX SPIRAL MONOCRYL PLUS 3-0 30CM PS-2 UNDYED

## (undated) DEVICE — GLV 7.5 PROTEXIS (WHITE)

## (undated) DEVICE — PREP BETADINE SPONGE STICKS

## (undated) DEVICE — DRAPE MAYO STAND 30"

## (undated) DEVICE — ELCTR BOVIE TIP BLADE MEGADYNE E-Z CLEAN 4" EXTENDED

## (undated) DEVICE — DRAPE SURGICAL #1010

## (undated) DEVICE — FRAZIER SUCTION TIP 10FR

## (undated) DEVICE — TONGUE DEPRESSOR

## (undated) DEVICE — SUT ETHILON 3-0 18" FS-1

## (undated) DEVICE — STAPLER SKIN PROXIMATE

## (undated) DEVICE — DRSG DERMABOND PRINEO 60CM

## (undated) DEVICE — GEL AQUSNC PACKET 20GR

## (undated) DEVICE — SUT VICRYL 2-0 27" CT-2 UNDYED

## (undated) DEVICE — SYR ASEPTO

## (undated) DEVICE — DRAPE TOWEL BLUE 17" X 24"

## (undated) DEVICE — ELCTR BOVIE TIP BLADE INSULATED 2.75" EDGE

## (undated) DEVICE — DRSG DERMABOND 0.7ML

## (undated) DEVICE — SUT MONOCRYL 3-0 27" PS-2 UNDYED